# Patient Record
Sex: MALE | Race: WHITE | NOT HISPANIC OR LATINO | Employment: UNEMPLOYED | URBAN - METROPOLITAN AREA
[De-identification: names, ages, dates, MRNs, and addresses within clinical notes are randomized per-mention and may not be internally consistent; named-entity substitution may affect disease eponyms.]

---

## 2020-03-18 ENCOUNTER — OFFICE VISIT (OUTPATIENT)
Dept: URGENT CARE | Facility: CLINIC | Age: 50
End: 2020-03-18

## 2020-03-18 VITALS
TEMPERATURE: 97.7 F | HEART RATE: 101 BPM | OXYGEN SATURATION: 98 % | DIASTOLIC BLOOD PRESSURE: 96 MMHG | SYSTOLIC BLOOD PRESSURE: 174 MMHG

## 2020-03-18 DIAGNOSIS — J06.9 URI WITH COUGH AND CONGESTION: Primary | ICD-10-CM

## 2020-03-18 DIAGNOSIS — J20.8 ACUTE BACTERIAL BRONCHITIS: ICD-10-CM

## 2020-03-18 DIAGNOSIS — B96.89 ACUTE BACTERIAL BRONCHITIS: ICD-10-CM

## 2020-03-18 PROCEDURE — 99213 OFFICE O/P EST LOW 20 MIN: CPT | Performed by: PHYSICIAN ASSISTANT

## 2020-03-18 RX ORDER — AZITHROMYCIN 250 MG/1
TABLET, FILM COATED ORAL
Qty: 6 TABLET | Refills: 0 | Status: SHIPPED | OUTPATIENT
Start: 2020-03-18 | End: 2020-03-22

## 2020-03-18 RX ORDER — ALBUTEROL SULFATE 90 UG/1
2 AEROSOL, METERED RESPIRATORY (INHALATION) EVERY 4 HOURS PRN
Qty: 8.5 G | Refills: 0 | Status: ON HOLD | OUTPATIENT
Start: 2020-03-18 | End: 2021-03-25 | Stop reason: SDUPTHER

## 2020-03-18 NOTE — PATIENT INSTRUCTIONS
Take the antibiotic as directed with food and water  Take a probiotic while taking this medication  Use the inhaler as directed  Ensure that you prime the inhaler prior to first use and rinse your mouth after each use  Begin Mucinex DM for cough and congestion relief  Saltwater gargles, warm tea with honey, throat lozenges, and steam showers may help to reduce coughing fits  Use a cool mist humidifier at bedtime, turning on hours prior to bed with your bedroom doors shut for maximum relief  Follow up with your family doctor in 3-5 days if symptoms persist   Proceed to the ER if symptoms worsen  How to Use a Metered-Dose Inhaler   WHAT YOU NEED TO KNOW:   A metered-dose inhaler is a handheld device that gives you a dose of medicine as a mist  You breathe the medicine deep into your lungs to open your airways  The medicine either gives quick relief or long term control of symptoms  Bronchodilators open your airways  Mucolytics thin secretions and make them easier to cough up  Steroids decrease inflammation in your airways  DISCHARGE INSTRUCTIONS:   Return to the emergency department if:   · Your lips or nails turn blue or gray  · The skin between your ribs or around your neck pulls in with every breath  · You feel short of breath, even after you use your inhaler  Contact your healthcare provider if:   · You feel the medicine spray on your tongue or throat, rather than going into your lungs  · You have to take more puffs from the inhaler than directed, in order to get relief  · You run out of medicine before your next refill is due  · You feel like your medicine is not making your symptoms better  · You have questions or concerns about your condition or care  How to use a metered-dose inhaler:  Practice using your inhaler  Your medicine will work best if you use them correctly  The following steps will help you use your inhaler correctly:  · Prepare your inhaler:     ¨ Remove the cap  Check to make sure there is nothing in the mouthpiece that could block the medicine from coming out  ¨ Shake the inhaler to mix the medicine  ¨ Hold the inhaler upright, with the mouthpiece pointing towards your mouth  · Get ready to breathe in the medicine:      ¨ Keep your mouth away from the inhaler mouthpiece, and breathe out fully to clear your lungs  ¨ Place the mouthpiece between your lips  Close your lips around the mouthpiece to form a seal and prevent a medicine leak  · Start to breathe in slowly through your mouth  as  you press down the canister  This helps the medicine get into your lungs  · Hold your breath for at least 5 seconds  This will help the medicine reach all parts of your lungs, including the smaller parts called the alveoli  · Breathe out slowly through pursed lips  This helps keep your airway open and allows the medicine to be absorbed into more areas  · Repeat puffs of medicine as directed by your healthcare provider  Wait about 2 minutes between puffs  If you need to use a bronchodilator and a steroid inhaler, use the bronchodilator first  Wait 5 minutes then use the steroid inhaler  · Gargle with warm water to remove any leftover medicine from your mouth and throat  Care for your inhaler properly:  Put the cap back on the inhaler after each use to keep the mouthpiece clean  Clean the inhaler at least once a week  Remove the canister and wash the ray with warm soapy water  Rinse and allow to air dry  Make sure the ray is completely dry before putting the canister back in  Follow up with your healthcare provider or specialist as directed:  Bring your inhaler to all of your visits  You may be asked to use your inhaler at these visits so your healthcare provider can make sure you are using it correctly  Write down your questions, so you remember to ask them during your visits    © 2017 [employer] Information is for End User's use only and may not be sold, redistributed or otherwise used for commercial purposes  All illustrations and images included in CareNotes® are the copyrighted property of [employer]  or [employer]kat [employer]  The above information is an  only  It is not intended as medical advice for individual conditions or treatments  Talk to your doctor, nurse or pharmacist before following any medical regimen to see if it is safe and effective for you

## 2020-03-18 NOTE — LETTER
March 18, 2020     Patient: Hola Marroquin   YOB: 1970   Date of Visit: 3/18/2020       To Whom It May Concern: It is my medical opinion that Hola Marroquin may return to work when fever free for 24 hours without the use of fever reducing medications  If you have any questions or concerns, please don't hesitate to call           Sincerely,        Chinedu Smith PA-C

## 2020-03-18 NOTE — PROGRESS NOTES
[address] [facility]        NAME: Brock Gonzales is a 45 y o  male  : 1970    MRN: [id number]  DATE: 2020  TIME: 2:55 PM    Assessment and Plan   URI with cough and congestion [J06 9]  1  URI with cough and congestion     2  Acute bacterial bronchitis  azithromycin (ZITHROMAX) 250 mg tablet    albuterol (ProAir HFA) 90 mcg/act inhaler     Patient Instructions   Take the antibiotic as directed with food and water  Take a probiotic while taking this medication  Use the inhaler as directed  Ensure that you prime the inhaler prior to first use and rinse your mouth after each use  Begin Mucinex DM for cough and congestion relief  Saltwater gargles, warm tea with honey, throat lozenges, and steam showers may help to reduce coughing fits  Use a cool mist humidifier at bedtime, turning on hours prior to bed with your bedroom doors shut for maximum relief  Follow up with your family doctor in 3-5 days if symptoms persist   Proceed to the ER if symptoms worsen  Patient notified of elevated blood pressure in office   He was advised to establish care with a PCP and was provided with resources to do so  Patient refused x-ray noting that he had to leave as his ride was waiting for him  Antibiotics sent to pharmacy for coverage of a presumed bacterial bronchitis  Reviewed use of inhaler  Advised supportive therapies in follow-up if symptoms persist or worsen  All questions answered  Precautions given  Patient notified Patient verbalized understanding and agreement with this plan  Chief Complaint   No chief complaint on file  History of Present Illness     46 y/o male with c/o cough x 10 days  He reports NC, RN, and PND at onset  No ear pain  Sore throat from cough  He reports intermittent wheezing, primarily at bedtime and slight ZAYAS  Cough is NP, occasional throughout the day but increases in frequency and spacticity at nighttime  Four days ago developed fever, tmax 101, sweats, and diarrhea  Lightheadedness with standing, otherwise no body aches, chills,   States he checked it on day of onset, but hasn't checked since  Today is beginning to feel better, but is concerned for persistence of cough  Treating with tylenol, last taken yesterday evening  No fever in office  No sick contacts or recent travel  Nonsmoker  No flu shot  Review of Systems   Review of Systems   Constitutional: Positive for diaphoresis, fatigue and fever  Negative for chills  HENT: Positive for congestion, ear pain, postnasal drip, rhinorrhea and sore throat  Respiratory: Positive for cough, shortness of breath and wheezing  Negative for chest tightness  Cardiovascular: Negative for chest pain and palpitations  Gastrointestinal: Positive for diarrhea  Negative for abdominal pain, nausea and vomiting  Musculoskeletal: Negative for myalgias  Skin: Negative for rash  Neurological: Positive for light-headedness  Negative for headaches  Current Medications       Current Outpatient Medications:     albuterol (ProAir HFA) 90 mcg/act inhaler, Inhale 2 puffs every 4 (four) hours as needed for wheezing or shortness of breath, Disp: 8 5 g, Rfl: 0    azithromycin (ZITHROMAX) 250 mg tablet, Take two tablets on day one, then one tablet daily  , Disp: 6 tablet, Rfl: 0    Current Allergies     Allergies as of 03/18/2020    (No Known Allergies)            The following portions of the patient's history were reviewed and updated as appropriate: allergies, current medications, past family history, past medical history, past social history, past surgical history and problem list      History reviewed  No pertinent past medical history  History reviewed  No pertinent surgical history  History reviewed  No pertinent family history  Medications have been verified          Objective   BP (!) 174/96   Pulse 101   Temp 97 7 °F (36 5 °C)   SpO2 98%        Physical Exam     Physical Exam   Constitutional: Vital signs are normal  He appears well-developed and well-nourished  He is cooperative  He appears ill  No distress  HENT:   Head: Normocephalic and atraumatic  Right Ear: Hearing, tympanic membrane, external ear and ear canal normal    Left Ear: Hearing, tympanic membrane, external ear and ear canal normal    Nose: Nose normal    Mouth/Throat: Uvula is midline, oropharynx is clear and moist and mucous membranes are normal  Mucous membranes are not pale, not dry and not cyanotic  No oral lesions  No uvula swelling  No oropharyngeal exudate, posterior oropharyngeal edema, posterior oropharyngeal erythema or tonsillar abscesses  Eyes: Conjunctivae and lids are normal  Right eye exhibits no discharge and no exudate  Left eye exhibits no discharge and no exudate  Neck: Trachea normal and phonation normal  Neck supple  No tracheal tenderness present  No neck rigidity  No edema and no erythema present  Cardiovascular: Normal rate, regular rhythm and normal heart sounds  Exam reveals no distant heart sounds  Pulmonary/Chest: Effort normal  No stridor  No respiratory distress  He has decreased breath sounds in the right lower field and the left lower field  He has no wheezes  He has rhonchi (clears with coughing) in the left lower field  He has no rales  Abdominal: Soft  Bowel sounds are normal  He exhibits no distension and no mass  There is no tenderness  There is no rigidity, no rebound and no guarding  Lymphadenopathy:     He has no cervical adenopathy  Neurological: He is alert  He is not disoriented  No cranial nerve deficit  Coordination and gait normal    Skin: Skin is warm, dry and intact  No rash noted  He is not diaphoretic  No erythema  No pallor  Psychiatric: He has a normal mood and affect  His behavior is normal  Judgment and thought content normal    Nursing note and vitals reviewed

## 2020-04-22 ENCOUNTER — TELEMEDICINE (OUTPATIENT)
Dept: FAMILY MEDICINE CLINIC | Facility: CLINIC | Age: 50
End: 2020-04-22
Payer: OTHER GOVERNMENT

## 2020-04-22 ENCOUNTER — TELEPHONE (OUTPATIENT)
Dept: FAMILY MEDICINE CLINIC | Facility: CLINIC | Age: 50
End: 2020-04-22

## 2020-04-22 DIAGNOSIS — R05.9 COUGH: Primary | ICD-10-CM

## 2020-04-22 DIAGNOSIS — Z20.828 EXPOSURE TO SARS-ASSOCIATED CORONAVIRUS: ICD-10-CM

## 2020-04-22 DIAGNOSIS — R06.02 SHORTNESS OF BREATH: ICD-10-CM

## 2020-04-22 PROCEDURE — G2012 BRIEF CHECK IN BY MD/QHP: HCPCS | Performed by: FAMILY MEDICINE

## 2020-04-22 PROCEDURE — 87635 SARS-COV-2 COVID-19 AMP PRB: CPT

## 2020-04-23 DIAGNOSIS — R05.9 COUGH: Primary | ICD-10-CM

## 2020-04-23 DIAGNOSIS — R06.02 SHORTNESS OF BREATH: ICD-10-CM

## 2020-04-23 LAB — SARS-COV-2 RNA SPEC QL NAA+PROBE: NOT DETECTED

## 2020-04-24 ENCOUNTER — APPOINTMENT (OUTPATIENT)
Dept: RADIOLOGY | Facility: CLINIC | Age: 50
End: 2020-04-24

## 2020-04-24 ENCOUNTER — TELEMEDICINE (OUTPATIENT)
Dept: FAMILY MEDICINE CLINIC | Facility: CLINIC | Age: 50
End: 2020-04-24
Payer: OTHER GOVERNMENT

## 2020-04-24 DIAGNOSIS — Z82.49 FAMILY HISTORY OF HEART DISEASE: ICD-10-CM

## 2020-04-24 DIAGNOSIS — I10 ESSENTIAL HYPERTENSION: Primary | ICD-10-CM

## 2020-04-24 DIAGNOSIS — R06.00 DOE (DYSPNEA ON EXERTION): ICD-10-CM

## 2020-04-24 DIAGNOSIS — I10 ESSENTIAL HYPERTENSION: ICD-10-CM

## 2020-04-24 DIAGNOSIS — R06.01 ORTHOPNEA: ICD-10-CM

## 2020-04-24 DIAGNOSIS — R06.02 SHORTNESS OF BREATH: ICD-10-CM

## 2020-04-24 DIAGNOSIS — R05.9 COUGH: ICD-10-CM

## 2020-04-24 DIAGNOSIS — R06.01 ORTHOPNEA: Primary | ICD-10-CM

## 2020-04-24 PROCEDURE — 71046 X-RAY EXAM CHEST 2 VIEWS: CPT

## 2020-04-24 PROCEDURE — G2012 BRIEF CHECK IN BY MD/QHP: HCPCS | Performed by: FAMILY MEDICINE

## 2020-05-01 ENCOUNTER — APPOINTMENT (INPATIENT)
Dept: RADIOLOGY | Facility: HOSPITAL | Age: 50
DRG: 192 | End: 2020-05-01
Payer: COMMERCIAL

## 2020-05-01 ENCOUNTER — TELEPHONE (OUTPATIENT)
Dept: FAMILY MEDICINE CLINIC | Facility: CLINIC | Age: 50
End: 2020-05-01

## 2020-05-01 ENCOUNTER — HOSPITAL ENCOUNTER (INPATIENT)
Facility: HOSPITAL | Age: 50
LOS: 6 days | Discharge: HOME | DRG: 192 | End: 2020-05-07
Attending: EMERGENCY MEDICINE | Admitting: INTERNAL MEDICINE
Payer: COMMERCIAL

## 2020-05-01 ENCOUNTER — APPOINTMENT (EMERGENCY)
Dept: RADIOLOGY | Facility: HOSPITAL | Age: 50
DRG: 192 | End: 2020-05-01
Payer: COMMERCIAL

## 2020-05-01 ENCOUNTER — TELEMEDICINE (OUTPATIENT)
Dept: FAMILY MEDICINE CLINIC | Facility: CLINIC | Age: 50
End: 2020-05-01
Payer: COMMERCIAL

## 2020-05-01 VITALS — SYSTOLIC BLOOD PRESSURE: 178 MMHG | HEART RATE: 111 BPM | DIASTOLIC BLOOD PRESSURE: 135 MMHG

## 2020-05-01 DIAGNOSIS — I16.1 HYPERTENSIVE EMERGENCY: ICD-10-CM

## 2020-05-01 DIAGNOSIS — I50.43 ACUTE ON CHRONIC COMBINED SYSTOLIC AND DIASTOLIC CONGESTIVE HEART FAILURE (HCC): ICD-10-CM

## 2020-05-01 DIAGNOSIS — Z22.322 MRSA (METHICILLIN RESISTANT STAPHYLOCOCCUS AUREUS) COLONIZATION: Primary | ICD-10-CM

## 2020-05-01 DIAGNOSIS — I10 UNCONTROLLED HYPERTENSION: ICD-10-CM

## 2020-05-01 DIAGNOSIS — I10 BENIGN ESSENTIAL HYPERTENSION: ICD-10-CM

## 2020-05-01 DIAGNOSIS — R00.0 TACHYCARDIA: ICD-10-CM

## 2020-05-01 DIAGNOSIS — R06.03 RESPIRATORY DISTRESS: ICD-10-CM

## 2020-05-01 DIAGNOSIS — I42.9 CARDIOMYOPATHY (HCC): ICD-10-CM

## 2020-05-01 DIAGNOSIS — R06.01 ORTHOPNEA: ICD-10-CM

## 2020-05-01 DIAGNOSIS — I10 BENIGN ESSENTIAL HYPERTENSION: Primary | ICD-10-CM

## 2020-05-01 DIAGNOSIS — R78.81 POSITIVE BLOOD CULTURE: ICD-10-CM

## 2020-05-01 DIAGNOSIS — R06.00 DOE (DYSPNEA ON EXERTION): ICD-10-CM

## 2020-05-01 DIAGNOSIS — R06.02 SHORTNESS OF BREATH: ICD-10-CM

## 2020-05-01 DIAGNOSIS — N17.9 AKI (ACUTE KIDNEY INJURY) (HCC): ICD-10-CM

## 2020-05-01 DIAGNOSIS — I50.43 ACUTE ON CHRONIC COMBINED SYSTOLIC AND DIASTOLIC CHF (CONGESTIVE HEART FAILURE) (HCC): ICD-10-CM

## 2020-05-01 DIAGNOSIS — R03.0 ELEVATED BLOOD PRESSURE READING: ICD-10-CM

## 2020-05-01 DIAGNOSIS — Z72.89 ALCOHOL USE: ICD-10-CM

## 2020-05-01 DIAGNOSIS — Z82.49 FAMILY HISTORY OF HEART DISEASE: ICD-10-CM

## 2020-05-01 DIAGNOSIS — K21.9 ESOPHAGEAL REFLUX: ICD-10-CM

## 2020-05-01 DIAGNOSIS — R14.0 ABDOMINAL DISTENSION: ICD-10-CM

## 2020-05-01 PROBLEM — J96.00 ACUTE RESPIRATORY FAILURE (HCC): Status: ACTIVE | Noted: 2020-05-01

## 2020-05-01 PROBLEM — Z78.9 ALCOHOL USE: Status: ACTIVE | Noted: 2020-05-01

## 2020-05-01 PROBLEM — J96.00 ACUTE RESPIRATORY FAILURE (HCC): Status: RESOLVED | Noted: 2020-05-01 | Resolved: 2020-05-01

## 2020-05-01 PROBLEM — I16.0 HYPERTENSIVE URGENCY: Status: ACTIVE | Noted: 2020-05-01

## 2020-05-01 PROBLEM — F10.90 ALCOHOL USE: Status: ACTIVE | Noted: 2020-05-01

## 2020-05-01 PROBLEM — R06.09 DOE (DYSPNEA ON EXERTION): Status: ACTIVE | Noted: 2020-05-01

## 2020-05-01 PROBLEM — J81.1 PULMONARY EDEMA: Status: ACTIVE | Noted: 2020-05-01

## 2020-05-01 LAB
ALBUMIN SERPL BCP-MCNC: 3.8 G/DL (ref 3.5–5)
ALP SERPL-CCNC: 77 U/L (ref 46–116)
ALT SERPL W P-5'-P-CCNC: 52 U/L (ref 12–78)
ANION GAP SERPL CALCULATED.3IONS-SCNC: 13 MMOL/L (ref 4–13)
APTT PPP: 27 SECONDS (ref 23–37)
ARTERIAL PATENCY WRIST A: YES
AST SERPL W P-5'-P-CCNC: 31 U/L (ref 5–45)
ATRIAL RATE: 106 BPM
BASE EXCESS BLDA CALC-SCNC: -2.5 MMOL/L
BASOPHILS # BLD AUTO: 0.07 THOUSANDS/ΜL (ref 0–0.1)
BASOPHILS NFR BLD AUTO: 1 % (ref 0–1)
BILIRUB SERPL-MCNC: 0.6 MG/DL (ref 0.2–1)
BODY TEMPERATURE: 98.4 DEGREES FEHRENHEIT
BUN SERPL-MCNC: 19 MG/DL (ref 5–25)
CALCIUM SERPL-MCNC: 8.9 MG/DL (ref 8.3–10.1)
CHLORIDE SERPL-SCNC: 108 MMOL/L (ref 100–108)
CO2 SERPL-SCNC: 25 MMOL/L (ref 21–32)
CREAT SERPL-MCNC: 1.49 MG/DL (ref 0.6–1.3)
EOSINOPHIL # BLD AUTO: 0.18 THOUSAND/ΜL (ref 0–0.61)
EOSINOPHIL NFR BLD AUTO: 2 % (ref 0–6)
ERYTHROCYTE [DISTWIDTH] IN BLOOD BY AUTOMATED COUNT: 14.1 % (ref 11.6–15.1)
GFR SERPL CREATININE-BSD FRML MDRD: 54 ML/MIN/1.73SQ M
GLUCOSE SERPL-MCNC: 108 MG/DL (ref 65–140)
HCO3 BLDA-SCNC: 22.6 MMOL/L (ref 22–28)
HCT VFR BLD AUTO: 52.1 % (ref 36.5–49.3)
HGB BLD-MCNC: 16.4 G/DL (ref 12–17)
IMM GRANULOCYTES # BLD AUTO: 0.04 THOUSAND/UL (ref 0–0.2)
IMM GRANULOCYTES NFR BLD AUTO: 0 % (ref 0–2)
INR PPP: 1.01 (ref 0.84–1.19)
IPAP: 16
LIPASE SERPL-CCNC: 116 U/L (ref 73–393)
LYMPHOCYTES # BLD AUTO: 2.01 THOUSANDS/ΜL (ref 0.6–4.47)
LYMPHOCYTES NFR BLD AUTO: 19 % (ref 14–44)
MAGNESIUM SERPL-MCNC: 2.1 MG/DL (ref 1.6–2.6)
MCH RBC QN AUTO: 29.7 PG (ref 26.8–34.3)
MCHC RBC AUTO-ENTMCNC: 31.5 G/DL (ref 31.4–37.4)
MCV RBC AUTO: 94 FL (ref 82–98)
MONOCYTES # BLD AUTO: 1.04 THOUSAND/ΜL (ref 0.17–1.22)
MONOCYTES NFR BLD AUTO: 10 % (ref 4–12)
NEUTROPHILS # BLD AUTO: 7.21 THOUSANDS/ΜL (ref 1.85–7.62)
NEUTS SEG NFR BLD AUTO: 68 % (ref 43–75)
NON VENT- BIPAP: ABNORMAL
NRBC BLD AUTO-RTO: 0 /100 WBCS
NT-PROBNP SERPL-MCNC: 5288 PG/ML
O2 CT BLDA-SCNC: 20.4 ML/DL (ref 16–23)
OXYHGB MFR BLDA: 97.5 % (ref 94–97)
P AXIS: 86 DEGREES
PCO2 BLDA: 40.2 MM HG (ref 36–44)
PCO2 TEMP ADJ BLDA: 40 MM HG (ref 36–44)
PEEP MAX SETTING VENT: 5 CM[H2O]
PH BLD: 7.37 [PH] (ref 7.35–7.45)
PH BLDA: 7.37 [PH] (ref 7.35–7.45)
PLATELET # BLD AUTO: 283 THOUSANDS/UL (ref 149–390)
PMV BLD AUTO: 10.6 FL (ref 8.9–12.7)
PO2 BLD: 148 MM HG (ref 75–129)
PO2 BLDA: 148.6 MM HG (ref 75–129)
POTASSIUM SERPL-SCNC: 3.8 MMOL/L (ref 3.5–5.3)
PR INTERVAL: 162 MS
PROCALCITONIN SERPL-MCNC: 0.06 NG/ML
PROT SERPL-MCNC: 7.3 G/DL (ref 6.4–8.2)
PROTHROMBIN TIME: 13.6 SECONDS (ref 11.6–14.5)
QRS AXIS: 120 DEGREES
QRSD INTERVAL: 158 MS
QT INTERVAL: 398 MS
QTC INTERVAL: 528 MS
RBC # BLD AUTO: 5.53 MILLION/UL (ref 3.88–5.62)
SARS-COV-2 RNA RESP QL NAA+PROBE: NEGATIVE
SODIUM SERPL-SCNC: 146 MMOL/L (ref 136–145)
SPECIMEN SOURCE: ABNORMAL
T WAVE AXIS: 14 DEGREES
T4 FREE SERPL-MCNC: 1.11 NG/DL (ref 0.76–1.46)
TROPONIN I SERPL-MCNC: 0.06 NG/ML
TROPONIN I SERPL-MCNC: 0.08 NG/ML
TSH SERPL DL<=0.05 MIU/L-ACNC: 8.8 UIU/ML (ref 0.36–3.74)
VENT BIPAP FIO2: 35 %
VENTRICULAR RATE: 106 BPM
WBC # BLD AUTO: 10.55 THOUSAND/UL (ref 4.31–10.16)

## 2020-05-01 PROCEDURE — 84439 ASSAY OF FREE THYROXINE: CPT | Performed by: NURSE PRACTITIONER

## 2020-05-01 PROCEDURE — 71045 X-RAY EXAM CHEST 1 VIEW: CPT

## 2020-05-01 PROCEDURE — 94760 N-INVAS EAR/PLS OXIMETRY 1: CPT

## 2020-05-01 PROCEDURE — 85610 PROTHROMBIN TIME: CPT | Performed by: EMERGENCY MEDICINE

## 2020-05-01 PROCEDURE — 87147 CULTURE TYPE IMMUNOLOGIC: CPT | Performed by: ANESTHESIOLOGY

## 2020-05-01 PROCEDURE — 96374 THER/PROPH/DIAG INJ IV PUSH: CPT

## 2020-05-01 PROCEDURE — 87635 SARS-COV-2 COVID-19 AMP PRB: CPT | Performed by: NURSE PRACTITIONER

## 2020-05-01 PROCEDURE — 80053 COMPREHEN METABOLIC PANEL: CPT | Performed by: EMERGENCY MEDICINE

## 2020-05-01 PROCEDURE — 85730 THROMBOPLASTIN TIME PARTIAL: CPT | Performed by: EMERGENCY MEDICINE

## 2020-05-01 PROCEDURE — 71250 CT THORAX DX C-: CPT

## 2020-05-01 PROCEDURE — 94002 VENT MGMT INPAT INIT DAY: CPT

## 2020-05-01 PROCEDURE — 84145 PROCALCITONIN (PCT): CPT | Performed by: NURSE PRACTITIONER

## 2020-05-01 PROCEDURE — 96365 THER/PROPH/DIAG IV INF INIT: CPT

## 2020-05-01 PROCEDURE — 87147 CULTURE TYPE IMMUNOLOGIC: CPT | Performed by: NURSE PRACTITIONER

## 2020-05-01 PROCEDURE — 36600 WITHDRAWAL OF ARTERIAL BLOOD: CPT

## 2020-05-01 PROCEDURE — 96366 THER/PROPH/DIAG IV INF ADDON: CPT

## 2020-05-01 PROCEDURE — 84484 ASSAY OF TROPONIN QUANT: CPT | Performed by: NURSE PRACTITIONER

## 2020-05-01 PROCEDURE — 74176 CT ABD & PELVIS W/O CONTRAST: CPT

## 2020-05-01 PROCEDURE — 87081 CULTURE SCREEN ONLY: CPT | Performed by: ANESTHESIOLOGY

## 2020-05-01 PROCEDURE — 83690 ASSAY OF LIPASE: CPT | Performed by: EMERGENCY MEDICINE

## 2020-05-01 PROCEDURE — 83735 ASSAY OF MAGNESIUM: CPT | Performed by: EMERGENCY MEDICINE

## 2020-05-01 PROCEDURE — 36415 COLL VENOUS BLD VENIPUNCTURE: CPT | Performed by: EMERGENCY MEDICINE

## 2020-05-01 PROCEDURE — 99291 CRITICAL CARE FIRST HOUR: CPT

## 2020-05-01 PROCEDURE — 99213 OFFICE O/P EST LOW 20 MIN: CPT | Performed by: FAMILY MEDICINE

## 2020-05-01 PROCEDURE — 84443 ASSAY THYROID STIM HORMONE: CPT | Performed by: EMERGENCY MEDICINE

## 2020-05-01 PROCEDURE — 84439 ASSAY OF FREE THYROXINE: CPT | Performed by: EMERGENCY MEDICINE

## 2020-05-01 PROCEDURE — 85025 COMPLETE CBC W/AUTO DIFF WBC: CPT | Performed by: EMERGENCY MEDICINE

## 2020-05-01 PROCEDURE — 87040 BLOOD CULTURE FOR BACTERIA: CPT | Performed by: NURSE PRACTITIONER

## 2020-05-01 PROCEDURE — 93005 ELECTROCARDIOGRAM TRACING: CPT

## 2020-05-01 PROCEDURE — 84484 ASSAY OF TROPONIN QUANT: CPT | Performed by: EMERGENCY MEDICINE

## 2020-05-01 PROCEDURE — 99223 1ST HOSP IP/OBS HIGH 75: CPT | Performed by: PHYSICIAN ASSISTANT

## 2020-05-01 PROCEDURE — 87186 SC STD MICRODIL/AGAR DIL: CPT | Performed by: NURSE PRACTITIONER

## 2020-05-01 PROCEDURE — 96375 TX/PRO/DX INJ NEW DRUG ADDON: CPT

## 2020-05-01 PROCEDURE — 82805 BLOOD GASES W/O2 SATURATION: CPT | Performed by: NURSE PRACTITIONER

## 2020-05-01 PROCEDURE — 83880 ASSAY OF NATRIURETIC PEPTIDE: CPT | Performed by: EMERGENCY MEDICINE

## 2020-05-01 PROCEDURE — 99291 CRITICAL CARE FIRST HOUR: CPT | Performed by: EMERGENCY MEDICINE

## 2020-05-01 PROCEDURE — 94003 VENT MGMT INPAT SUBQ DAY: CPT

## 2020-05-01 PROCEDURE — 87077 CULTURE AEROBIC IDENTIFY: CPT | Performed by: NURSE PRACTITIONER

## 2020-05-01 PROCEDURE — 93010 ELECTROCARDIOGRAM REPORT: CPT | Performed by: INTERNAL MEDICINE

## 2020-05-01 RX ORDER — NITROGLYCERIN 0.4 MG/1
0.4 TABLET SUBLINGUAL ONCE
Status: COMPLETED | OUTPATIENT
Start: 2020-05-01 | End: 2020-05-01

## 2020-05-01 RX ORDER — ASPIRIN 81 MG/1
81 TABLET, CHEWABLE ORAL DAILY
Status: DISCONTINUED | OUTPATIENT
Start: 2020-05-02 | End: 2020-05-07 | Stop reason: HOSPADM

## 2020-05-01 RX ORDER — HEPARIN SODIUM 5000 [USP'U]/ML
7500 INJECTION, SOLUTION INTRAVENOUS; SUBCUTANEOUS EVERY 8 HOURS SCHEDULED
Status: DISCONTINUED | OUTPATIENT
Start: 2020-05-01 | End: 2020-05-07 | Stop reason: HOSPADM

## 2020-05-01 RX ORDER — METOCLOPRAMIDE HYDROCHLORIDE 5 MG/ML
10 INJECTION INTRAMUSCULAR; INTRAVENOUS ONCE
Status: COMPLETED | OUTPATIENT
Start: 2020-05-01 | End: 2020-05-01

## 2020-05-01 RX ORDER — FUROSEMIDE 10 MG/ML
40 INJECTION INTRAMUSCULAR; INTRAVENOUS ONCE
Status: COMPLETED | OUTPATIENT
Start: 2020-05-01 | End: 2020-05-01

## 2020-05-01 RX ORDER — ENALAPRILAT 2.5 MG/2ML
1.25 INJECTION INTRAVENOUS ONCE
Status: COMPLETED | OUTPATIENT
Start: 2020-05-01 | End: 2020-05-01

## 2020-05-01 RX ORDER — AMOXICILLIN 250 MG
2 CAPSULE ORAL 2 TIMES DAILY
Status: DISCONTINUED | OUTPATIENT
Start: 2020-05-01 | End: 2020-05-07 | Stop reason: HOSPADM

## 2020-05-01 RX ORDER — NITROGLYCERIN 20 MG/100ML
5-200 INJECTION INTRAVENOUS
Status: DISCONTINUED | OUTPATIENT
Start: 2020-05-01 | End: 2020-05-03

## 2020-05-01 RX ORDER — ASPIRIN 81 MG/1
324 TABLET, CHEWABLE ORAL ONCE
Status: COMPLETED | OUTPATIENT
Start: 2020-05-01 | End: 2020-05-01

## 2020-05-01 RX ORDER — POLYETHYLENE GLYCOL 3350 17 G/17G
17 POWDER, FOR SOLUTION ORAL DAILY
Status: DISCONTINUED | OUTPATIENT
Start: 2020-05-01 | End: 2020-05-07 | Stop reason: HOSPADM

## 2020-05-01 RX ADMIN — NITROGLYCERIN 0.4 MG: 0.4 TABLET SUBLINGUAL at 15:42

## 2020-05-01 RX ADMIN — SENNOSIDES AND DOCUSATE SODIUM 2 TABLET: 8.6; 5 TABLET ORAL at 22:00

## 2020-05-01 RX ADMIN — ASPIRIN 81 MG 324 MG: 81 TABLET ORAL at 16:45

## 2020-05-01 RX ADMIN — FUROSEMIDE 40 MG: 10 INJECTION, SOLUTION INTRAMUSCULAR; INTRAVENOUS at 16:47

## 2020-05-01 RX ADMIN — METOCLOPRAMIDE 10 MG: 5 INJECTION, SOLUTION INTRAMUSCULAR; INTRAVENOUS at 21:53

## 2020-05-01 RX ADMIN — POLYETHYLENE GLYCOL 3350 17 G: 17 POWDER, FOR SOLUTION ORAL at 22:00

## 2020-05-01 RX ADMIN — NITROGLYCERIN 30 MCG/MIN: 20 INJECTION INTRAVENOUS at 15:49

## 2020-05-01 RX ADMIN — ENALAPRILAT 1.25 MG: 1.25 INJECTION INTRAVENOUS at 17:05

## 2020-05-01 RX ADMIN — HEPARIN SODIUM 7500 UNITS: 5000 INJECTION INTRAVENOUS; SUBCUTANEOUS at 21:52

## 2020-05-01 RX ADMIN — METOPROLOL TARTRATE 25 MG: 25 TABLET, FILM COATED ORAL at 21:54

## 2020-05-02 ENCOUNTER — APPOINTMENT (INPATIENT)
Dept: NON INVASIVE DIAGNOSTICS | Facility: HOSPITAL | Age: 50
DRG: 192 | End: 2020-05-02
Payer: COMMERCIAL

## 2020-05-02 LAB
ALBUMIN SERPL BCP-MCNC: 3.1 G/DL (ref 3.5–5)
ALP SERPL-CCNC: 65 U/L (ref 46–116)
ALT SERPL W P-5'-P-CCNC: 43 U/L (ref 12–78)
AMPHETAMINES SERPL QL SCN: POSITIVE
ANION GAP SERPL CALCULATED.3IONS-SCNC: 10 MMOL/L (ref 4–13)
AST SERPL W P-5'-P-CCNC: 24 U/L (ref 5–45)
BACTERIA UR QL AUTO: ABNORMAL /HPF
BARBITURATES UR QL: NEGATIVE
BASOPHILS # BLD AUTO: 0.05 THOUSANDS/ΜL (ref 0–0.1)
BASOPHILS NFR BLD AUTO: 0 % (ref 0–1)
BENZODIAZ UR QL: NEGATIVE
BILIRUB SERPL-MCNC: 1.1 MG/DL (ref 0.2–1)
BILIRUB UR QL STRIP: NEGATIVE
BUN SERPL-MCNC: 16 MG/DL (ref 5–25)
CALCIUM SERPL-MCNC: 8.2 MG/DL (ref 8.3–10.1)
CHLORIDE SERPL-SCNC: 109 MMOL/L (ref 100–108)
CLARITY UR: CLEAR
CO2 SERPL-SCNC: 25 MMOL/L (ref 21–32)
COCAINE UR QL: NEGATIVE
COLOR UR: YELLOW
CREAT SERPL-MCNC: 1.34 MG/DL (ref 0.6–1.3)
EOSINOPHIL # BLD AUTO: 0.19 THOUSAND/ΜL (ref 0–0.61)
EOSINOPHIL NFR BLD AUTO: 2 % (ref 0–6)
ERYTHROCYTE [DISTWIDTH] IN BLOOD BY AUTOMATED COUNT: 14 % (ref 11.6–15.1)
GFR SERPL CREATININE-BSD FRML MDRD: 61 ML/MIN/1.73SQ M
GLUCOSE SERPL-MCNC: 104 MG/DL (ref 65–140)
GLUCOSE SERPL-MCNC: 125 MG/DL (ref 65–140)
GLUCOSE UR STRIP-MCNC: NEGATIVE MG/DL
HCT VFR BLD AUTO: 43 % (ref 36.5–49.3)
HGB BLD-MCNC: 13.6 G/DL (ref 12–17)
HGB UR QL STRIP.AUTO: ABNORMAL
HYALINE CASTS #/AREA URNS LPF: ABNORMAL /LPF
IMM GRANULOCYTES # BLD AUTO: 0.03 THOUSAND/UL (ref 0–0.2)
IMM GRANULOCYTES NFR BLD AUTO: 0 % (ref 0–2)
KETONES UR STRIP-MCNC: NEGATIVE MG/DL
LEUKOCYTE ESTERASE UR QL STRIP: NEGATIVE
LIPASE SERPL-CCNC: 123 U/L (ref 73–393)
LYMPHOCYTES # BLD AUTO: 1.23 THOUSANDS/ΜL (ref 0.6–4.47)
LYMPHOCYTES NFR BLD AUTO: 11 % (ref 14–44)
MAGNESIUM SERPL-MCNC: 1.9 MG/DL (ref 1.6–2.6)
MCH RBC QN AUTO: 29.4 PG (ref 26.8–34.3)
MCHC RBC AUTO-ENTMCNC: 31.6 G/DL (ref 31.4–37.4)
MCV RBC AUTO: 93 FL (ref 82–98)
METHADONE UR QL: NEGATIVE
MONOCYTES # BLD AUTO: 1.25 THOUSAND/ΜL (ref 0.17–1.22)
MONOCYTES NFR BLD AUTO: 11 % (ref 4–12)
MUCOUS THREADS UR QL AUTO: ABNORMAL
NEUTROPHILS # BLD AUTO: 8.49 THOUSANDS/ΜL (ref 1.85–7.62)
NEUTS SEG NFR BLD AUTO: 76 % (ref 43–75)
NITRITE UR QL STRIP: NEGATIVE
NON-SQ EPI CELLS URNS QL MICRO: ABNORMAL /HPF
NRBC BLD AUTO-RTO: 0 /100 WBCS
OPIATES UR QL SCN: NEGATIVE
PCP UR QL: NEGATIVE
PH UR STRIP.AUTO: 5.5 [PH]
PHOSPHATE SERPL-MCNC: 4.9 MG/DL (ref 2.7–4.5)
PLATELET # BLD AUTO: 223 THOUSANDS/UL (ref 149–390)
PMV BLD AUTO: 10.2 FL (ref 8.9–12.7)
POTASSIUM SERPL-SCNC: 3.7 MMOL/L (ref 3.5–5.3)
PROT SERPL-MCNC: 6.1 G/DL (ref 6.4–8.2)
PROT UR STRIP-MCNC: ABNORMAL MG/DL
RBC # BLD AUTO: 4.63 MILLION/UL (ref 3.88–5.62)
RBC #/AREA URNS AUTO: ABNORMAL /HPF
SODIUM SERPL-SCNC: 144 MMOL/L (ref 136–145)
SP GR UR STRIP.AUTO: 1.02 (ref 1–1.03)
T4 FREE SERPL-MCNC: 1.02 NG/DL (ref 0.76–1.46)
THC UR QL: NEGATIVE
UROBILINOGEN UR QL STRIP.AUTO: 0.2 E.U./DL
WBC # BLD AUTO: 11.24 THOUSAND/UL (ref 4.31–10.16)
WBC #/AREA URNS AUTO: ABNORMAL /HPF

## 2020-05-02 PROCEDURE — 94760 N-INVAS EAR/PLS OXIMETRY 1: CPT

## 2020-05-02 PROCEDURE — 93306 TTE W/DOPPLER COMPLETE: CPT

## 2020-05-02 PROCEDURE — 93306 TTE W/DOPPLER COMPLETE: CPT | Performed by: INTERNAL MEDICINE

## 2020-05-02 PROCEDURE — 80307 DRUG TEST PRSMV CHEM ANLYZR: CPT | Performed by: PHYSICIAN ASSISTANT

## 2020-05-02 PROCEDURE — 81001 URINALYSIS AUTO W/SCOPE: CPT | Performed by: PHYSICIAN ASSISTANT

## 2020-05-02 PROCEDURE — 97163 PT EVAL HIGH COMPLEX 45 MIN: CPT

## 2020-05-02 PROCEDURE — 97167 OT EVAL HIGH COMPLEX 60 MIN: CPT

## 2020-05-02 PROCEDURE — 82948 REAGENT STRIP/BLOOD GLUCOSE: CPT

## 2020-05-02 PROCEDURE — 83690 ASSAY OF LIPASE: CPT | Performed by: NURSE PRACTITIONER

## 2020-05-02 PROCEDURE — 85025 COMPLETE CBC W/AUTO DIFF WBC: CPT | Performed by: NURSE PRACTITIONER

## 2020-05-02 PROCEDURE — 99232 SBSQ HOSP IP/OBS MODERATE 35: CPT | Performed by: ANESTHESIOLOGY

## 2020-05-02 PROCEDURE — 94003 VENT MGMT INPAT SUBQ DAY: CPT

## 2020-05-02 PROCEDURE — 84100 ASSAY OF PHOSPHORUS: CPT | Performed by: NURSE PRACTITIONER

## 2020-05-02 PROCEDURE — 83735 ASSAY OF MAGNESIUM: CPT | Performed by: NURSE PRACTITIONER

## 2020-05-02 PROCEDURE — 80053 COMPREHEN METABOLIC PANEL: CPT | Performed by: NURSE PRACTITIONER

## 2020-05-02 RX ORDER — LISINOPRIL 5 MG/1
5 TABLET ORAL DAILY
Status: DISCONTINUED | OUTPATIENT
Start: 2020-05-02 | End: 2020-05-07 | Stop reason: HOSPADM

## 2020-05-02 RX ORDER — MAGNESIUM HYDROXIDE/ALUMINUM HYDROXICE/SIMETHICONE 120; 1200; 1200 MG/30ML; MG/30ML; MG/30ML
30 SUSPENSION ORAL EVERY 4 HOURS PRN
Status: DISCONTINUED | OUTPATIENT
Start: 2020-05-02 | End: 2020-05-07 | Stop reason: HOSPADM

## 2020-05-02 RX ORDER — THIAMINE MONONITRATE (VIT B1) 100 MG
100 TABLET ORAL DAILY
Status: DISCONTINUED | OUTPATIENT
Start: 2020-05-02 | End: 2020-05-07 | Stop reason: HOSPADM

## 2020-05-02 RX ORDER — PANTOPRAZOLE SODIUM 40 MG/1
40 TABLET, DELAYED RELEASE ORAL
Status: DISCONTINUED | OUTPATIENT
Start: 2020-05-02 | End: 2020-05-07 | Stop reason: HOSPADM

## 2020-05-02 RX ORDER — FOLIC ACID 1 MG/1
1 TABLET ORAL DAILY
Status: DISCONTINUED | OUTPATIENT
Start: 2020-05-02 | End: 2020-05-07 | Stop reason: HOSPADM

## 2020-05-02 RX ORDER — SPIRONOLACTONE 25 MG/1
25 TABLET ORAL DAILY
Status: DISCONTINUED | OUTPATIENT
Start: 2020-05-02 | End: 2020-05-07

## 2020-05-02 RX ORDER — FUROSEMIDE 10 MG/ML
40 INJECTION INTRAMUSCULAR; INTRAVENOUS ONCE
Status: COMPLETED | OUTPATIENT
Start: 2020-05-02 | End: 2020-05-02

## 2020-05-02 RX ADMIN — HEPARIN SODIUM 7500 UNITS: 5000 INJECTION INTRAVENOUS; SUBCUTANEOUS at 21:26

## 2020-05-02 RX ADMIN — SENNOSIDES AND DOCUSATE SODIUM 2 TABLET: 8.6; 5 TABLET ORAL at 09:29

## 2020-05-02 RX ADMIN — METOPROLOL TARTRATE 25 MG: 25 TABLET, FILM COATED ORAL at 20:36

## 2020-05-02 RX ADMIN — METOPROLOL TARTRATE 25 MG: 25 TABLET, FILM COATED ORAL at 09:30

## 2020-05-02 RX ADMIN — ALUMINUM HYDROXIDE, MAGNESIUM HYDROXIDE, AND SIMETHICONE 30 ML: 200; 200; 20 SUSPENSION ORAL at 21:15

## 2020-05-02 RX ADMIN — SENNOSIDES AND DOCUSATE SODIUM 2 TABLET: 8.6; 5 TABLET ORAL at 17:56

## 2020-05-02 RX ADMIN — LISINOPRIL 5 MG: 5 TABLET ORAL at 12:07

## 2020-05-02 RX ADMIN — PANTOPRAZOLE SODIUM 40 MG: 40 TABLET, DELAYED RELEASE ORAL at 21:15

## 2020-05-02 RX ADMIN — ASPIRIN 81 MG 81 MG: 81 TABLET ORAL at 09:30

## 2020-05-02 RX ADMIN — POLYETHYLENE GLYCOL 3350 17 G: 17 POWDER, FOR SOLUTION ORAL at 09:30

## 2020-05-02 RX ADMIN — FUROSEMIDE 40 MG: 10 INJECTION, SOLUTION INTRAMUSCULAR; INTRAVENOUS at 12:08

## 2020-05-02 RX ADMIN — HEPARIN SODIUM 7500 UNITS: 5000 INJECTION INTRAVENOUS; SUBCUTANEOUS at 13:58

## 2020-05-02 RX ADMIN — FOLIC ACID 1 MG: 1 TABLET ORAL at 12:09

## 2020-05-02 RX ADMIN — HEPARIN SODIUM 7500 UNITS: 5000 INJECTION INTRAVENOUS; SUBCUTANEOUS at 06:09

## 2020-05-02 RX ADMIN — SPIRONOLACTONE 25 MG: 25 TABLET ORAL at 17:56

## 2020-05-02 RX ADMIN — Medication 100 MG: at 12:09

## 2020-05-02 RX ADMIN — Medication 1 TABLET: at 12:07

## 2020-05-03 PROBLEM — I10 UNCONTROLLED HYPERTENSION: Status: ACTIVE | Noted: 2020-05-01

## 2020-05-03 PROBLEM — I42.9 CARDIOMYOPATHY (HCC): Status: ACTIVE | Noted: 2020-05-03

## 2020-05-03 PROBLEM — R78.81 POSITIVE BLOOD CULTURE: Status: ACTIVE | Noted: 2020-05-03

## 2020-05-03 LAB
ANION GAP SERPL CALCULATED.3IONS-SCNC: 7 MMOL/L (ref 4–13)
BUN SERPL-MCNC: 16 MG/DL (ref 5–25)
CALCIUM SERPL-MCNC: 8.6 MG/DL (ref 8.3–10.1)
CHLORIDE SERPL-SCNC: 107 MMOL/L (ref 100–108)
CO2 SERPL-SCNC: 29 MMOL/L (ref 21–32)
CREAT SERPL-MCNC: 1.34 MG/DL (ref 0.6–1.3)
ERYTHROCYTE [DISTWIDTH] IN BLOOD BY AUTOMATED COUNT: 13.8 % (ref 11.6–15.1)
ERYTHROCYTE [DISTWIDTH] IN BLOOD BY AUTOMATED COUNT: 14.1 % (ref 11.6–15.1)
GFR SERPL CREATININE-BSD FRML MDRD: 61 ML/MIN/1.73SQ M
GLUCOSE SERPL-MCNC: 94 MG/DL (ref 65–140)
HCT VFR BLD AUTO: 46.4 % (ref 36.5–49.3)
HCT VFR BLD AUTO: 48.1 % (ref 36.5–49.3)
HGB BLD-MCNC: 14.5 G/DL (ref 12–17)
HGB BLD-MCNC: 15 G/DL (ref 12–17)
MCH RBC QN AUTO: 29.2 PG (ref 26.8–34.3)
MCH RBC QN AUTO: 29.4 PG (ref 26.8–34.3)
MCHC RBC AUTO-ENTMCNC: 31.2 G/DL (ref 31.4–37.4)
MCHC RBC AUTO-ENTMCNC: 31.3 G/DL (ref 31.4–37.4)
MCV RBC AUTO: 94 FL (ref 82–98)
MCV RBC AUTO: 94 FL (ref 82–98)
MRSA NOSE QL CULT: ABNORMAL
MRSA NOSE QL CULT: ABNORMAL
PLATELET # BLD AUTO: 217 THOUSANDS/UL (ref 149–390)
PLATELET # BLD AUTO: 252 THOUSANDS/UL (ref 149–390)
PMV BLD AUTO: 10.1 FL (ref 8.9–12.7)
PMV BLD AUTO: 10.2 FL (ref 8.9–12.7)
POTASSIUM SERPL-SCNC: 3.9 MMOL/L (ref 3.5–5.3)
RBC # BLD AUTO: 4.94 MILLION/UL (ref 3.88–5.62)
RBC # BLD AUTO: 5.14 MILLION/UL (ref 3.88–5.62)
SODIUM SERPL-SCNC: 143 MMOL/L (ref 136–145)
WBC # BLD AUTO: 8.71 THOUSAND/UL (ref 4.31–10.16)
WBC # BLD AUTO: 9.23 THOUSAND/UL (ref 4.31–10.16)

## 2020-05-03 PROCEDURE — 90682 RIV4 VACC RECOMBINANT DNA IM: CPT | Performed by: NURSE PRACTITIONER

## 2020-05-03 PROCEDURE — 85027 COMPLETE CBC AUTOMATED: CPT | Performed by: INTERNAL MEDICINE

## 2020-05-03 PROCEDURE — 99255 IP/OBS CONSLTJ NEW/EST HI 80: CPT | Performed by: INTERNAL MEDICINE

## 2020-05-03 PROCEDURE — 87040 BLOOD CULTURE FOR BACTERIA: CPT | Performed by: INTERNAL MEDICINE

## 2020-05-03 PROCEDURE — 94760 N-INVAS EAR/PLS OXIMETRY 1: CPT

## 2020-05-03 PROCEDURE — 94003 VENT MGMT INPAT SUBQ DAY: CPT

## 2020-05-03 PROCEDURE — 90471 IMMUNIZATION ADMIN: CPT | Performed by: NURSE PRACTITIONER

## 2020-05-03 PROCEDURE — 94762 N-INVAS EAR/PLS OXIMTRY CONT: CPT

## 2020-05-03 PROCEDURE — 99233 SBSQ HOSP IP/OBS HIGH 50: CPT | Performed by: INTERNAL MEDICINE

## 2020-05-03 PROCEDURE — 80048 BASIC METABOLIC PNL TOTAL CA: CPT | Performed by: INTERNAL MEDICINE

## 2020-05-03 RX ORDER — CARVEDILOL 6.25 MG/1
6.25 TABLET ORAL 2 TIMES DAILY WITH MEALS
Status: DISCONTINUED | OUTPATIENT
Start: 2020-05-03 | End: 2020-05-04

## 2020-05-03 RX ORDER — CEFTRIAXONE 1 G/50ML
1000 INJECTION, SOLUTION INTRAVENOUS EVERY 24 HOURS
Status: DISCONTINUED | OUTPATIENT
Start: 2020-05-03 | End: 2020-05-04

## 2020-05-03 RX ORDER — FUROSEMIDE 10 MG/ML
20 INJECTION INTRAMUSCULAR; INTRAVENOUS ONCE
Status: COMPLETED | OUTPATIENT
Start: 2020-05-03 | End: 2020-05-03

## 2020-05-03 RX ADMIN — HEPARIN SODIUM 7500 UNITS: 5000 INJECTION INTRAVENOUS; SUBCUTANEOUS at 22:28

## 2020-05-03 RX ADMIN — LISINOPRIL 5 MG: 5 TABLET ORAL at 09:50

## 2020-05-03 RX ADMIN — HEPARIN SODIUM 7500 UNITS: 5000 INJECTION INTRAVENOUS; SUBCUTANEOUS at 05:02

## 2020-05-03 RX ADMIN — SENNOSIDES AND DOCUSATE SODIUM 2 TABLET: 8.6; 5 TABLET ORAL at 09:49

## 2020-05-03 RX ADMIN — POLYETHYLENE GLYCOL 3350 17 G: 17 POWDER, FOR SOLUTION ORAL at 09:50

## 2020-05-03 RX ADMIN — INFLUENZA A VIRUS A/BRISBANE/02/2018 (H1N1) RECOMBINANT HEMAGGLUTININ ANTIGEN, INFLUENZA A VIRUS A/KANSAS/14/2017 (H3N2) RECOMBINANT HEMAGGLUTININ ANTIGEN, INFLUENZA B VIRUS B/PHUKET/3073/2013 RECOMBINANT HEMAGGLUTININ ANTIGEN, AND INFLUENZA B VIRUS B/MARYLAND/15/2016 RECOMBINANT HEMAGGLUTININ ANTIGEN 0.5 ML: 45; 45; 45; 45 INJECTION INTRAMUSCULAR at 10:48

## 2020-05-03 RX ADMIN — FOLIC ACID 1 MG: 1 TABLET ORAL at 09:49

## 2020-05-03 RX ADMIN — Medication 100 MG: at 09:49

## 2020-05-03 RX ADMIN — SPIRONOLACTONE 25 MG: 25 TABLET ORAL at 09:49

## 2020-05-03 RX ADMIN — CEFTRIAXONE 1000 MG: 1 INJECTION, SOLUTION INTRAVENOUS at 19:07

## 2020-05-03 RX ADMIN — SENNOSIDES AND DOCUSATE SODIUM 2 TABLET: 8.6; 5 TABLET ORAL at 19:06

## 2020-05-03 RX ADMIN — PANTOPRAZOLE SODIUM 40 MG: 40 TABLET, DELAYED RELEASE ORAL at 05:02

## 2020-05-03 RX ADMIN — Medication 1 TABLET: at 09:49

## 2020-05-03 RX ADMIN — MUPIROCIN 1 APPLICATION: 20 OINTMENT TOPICAL at 21:56

## 2020-05-03 RX ADMIN — METOPROLOL TARTRATE 25 MG: 25 TABLET, FILM COATED ORAL at 09:49

## 2020-05-03 RX ADMIN — ASPIRIN 81 MG 81 MG: 81 TABLET ORAL at 09:49

## 2020-05-03 RX ADMIN — HEPARIN SODIUM 7500 UNITS: 5000 INJECTION INTRAVENOUS; SUBCUTANEOUS at 14:41

## 2020-05-03 RX ADMIN — CARVEDILOL 6.25 MG: 6.25 TABLET, FILM COATED ORAL at 19:07

## 2020-05-03 RX ADMIN — FUROSEMIDE 20 MG: 10 INJECTION, SOLUTION INTRAMUSCULAR; INTRAVENOUS at 14:41

## 2020-05-04 ENCOUNTER — APPOINTMENT (OUTPATIENT)
Dept: NON INVASIVE DIAGNOSTICS | Facility: HOSPITAL | Age: 50
DRG: 192 | End: 2020-05-04
Payer: COMMERCIAL

## 2020-05-04 PROBLEM — I50.43 ACUTE ON CHRONIC COMBINED SYSTOLIC AND DIASTOLIC CHF (CONGESTIVE HEART FAILURE) (HCC): Status: ACTIVE | Noted: 2020-05-04

## 2020-05-04 PROBLEM — I50.23 ACUTE ON CHRONIC SYSTOLIC (CONGESTIVE) HEART FAILURE (HCC): Status: ACTIVE | Noted: 2020-05-04

## 2020-05-04 LAB
ALBUMIN SERPL BCP-MCNC: 2.9 G/DL (ref 3.5–5)
ALP SERPL-CCNC: 59 U/L (ref 46–116)
ALT SERPL W P-5'-P-CCNC: 31 U/L (ref 12–78)
ANION GAP SERPL CALCULATED.3IONS-SCNC: 5 MMOL/L (ref 4–13)
ANION GAP SERPL CALCULATED.3IONS-SCNC: 7 MMOL/L (ref 4–13)
AST SERPL W P-5'-P-CCNC: 17 U/L (ref 5–45)
BILIRUB SERPL-MCNC: 0.4 MG/DL (ref 0.2–1)
BUN SERPL-MCNC: 17 MG/DL (ref 5–25)
BUN SERPL-MCNC: 18 MG/DL (ref 5–25)
CALCIUM SERPL-MCNC: 8.3 MG/DL (ref 8.3–10.1)
CALCIUM SERPL-MCNC: 8.6 MG/DL (ref 8.3–10.1)
CHLORIDE SERPL-SCNC: 105 MMOL/L (ref 100–108)
CHLORIDE SERPL-SCNC: 108 MMOL/L (ref 100–108)
CHOLEST SERPL-MCNC: 115 MG/DL (ref 50–200)
CO2 SERPL-SCNC: 30 MMOL/L (ref 21–32)
CO2 SERPL-SCNC: 31 MMOL/L (ref 21–32)
CREAT SERPL-MCNC: 1.33 MG/DL (ref 0.6–1.3)
CREAT SERPL-MCNC: 1.39 MG/DL (ref 0.6–1.3)
GFR SERPL CREATININE-BSD FRML MDRD: 58 ML/MIN/1.73SQ M
GFR SERPL CREATININE-BSD FRML MDRD: 61 ML/MIN/1.73SQ M
GLUCOSE SERPL-MCNC: 106 MG/DL (ref 65–140)
GLUCOSE SERPL-MCNC: 107 MG/DL (ref 65–140)
GLUCOSE SERPL-MCNC: 98 MG/DL (ref 65–140)
HDLC SERPL-MCNC: 32 MG/DL
LDLC SERPL CALC-MCNC: 57 MG/DL (ref 0–100)
NONHDLC SERPL-MCNC: 83 MG/DL
POTASSIUM SERPL-SCNC: 3.9 MMOL/L (ref 3.5–5.3)
POTASSIUM SERPL-SCNC: 4 MMOL/L (ref 3.5–5.3)
PROCALCITONIN SERPL-MCNC: <0.05 NG/ML
PROT SERPL-MCNC: 6.3 G/DL (ref 6.4–8.2)
SODIUM SERPL-SCNC: 142 MMOL/L (ref 136–145)
SODIUM SERPL-SCNC: 144 MMOL/L (ref 136–145)
TRIGL SERPL-MCNC: 130 MG/DL

## 2020-05-04 PROCEDURE — 80061 LIPID PANEL: CPT | Performed by: INTERNAL MEDICINE

## 2020-05-04 PROCEDURE — C1894 INTRO/SHEATH, NON-LASER: HCPCS

## 2020-05-04 PROCEDURE — 82948 REAGENT STRIP/BLOOD GLUCOSE: CPT

## 2020-05-04 PROCEDURE — 94003 VENT MGMT INPAT SUBQ DAY: CPT

## 2020-05-04 PROCEDURE — 93458 L HRT ARTERY/VENTRICLE ANGIO: CPT | Performed by: INTERNAL MEDICINE

## 2020-05-04 PROCEDURE — 99233 SBSQ HOSP IP/OBS HIGH 50: CPT | Performed by: NURSE PRACTITIONER

## 2020-05-04 PROCEDURE — C1769 GUIDE WIRE: HCPCS

## 2020-05-04 PROCEDURE — 93454 CORONARY ARTERY ANGIO S&I: CPT

## 2020-05-04 PROCEDURE — B2151ZZ FLUOROSCOPY OF LEFT HEART USING LOW OSMOLAR CONTRAST: ICD-10-PCS | Performed by: INTERNAL MEDICINE

## 2020-05-04 PROCEDURE — 94660 CPAP INITIATION&MGMT: CPT

## 2020-05-04 PROCEDURE — 84145 PROCALCITONIN (PCT): CPT | Performed by: INTERNAL MEDICINE

## 2020-05-04 PROCEDURE — 99152 MOD SED SAME PHYS/QHP 5/>YRS: CPT | Performed by: INTERNAL MEDICINE

## 2020-05-04 PROCEDURE — B2111ZZ FLUOROSCOPY OF MULTIPLE CORONARY ARTERIES USING LOW OSMOLAR CONTRAST: ICD-10-PCS | Performed by: INTERNAL MEDICINE

## 2020-05-04 PROCEDURE — 94760 N-INVAS EAR/PLS OXIMETRY 1: CPT

## 2020-05-04 PROCEDURE — 99233 SBSQ HOSP IP/OBS HIGH 50: CPT | Performed by: INTERNAL MEDICINE

## 2020-05-04 PROCEDURE — 80053 COMPREHEN METABOLIC PANEL: CPT | Performed by: INTERNAL MEDICINE

## 2020-05-04 PROCEDURE — 80048 BASIC METABOLIC PNL TOTAL CA: CPT | Performed by: NURSE PRACTITIONER

## 2020-05-04 PROCEDURE — 99233 SBSQ HOSP IP/OBS HIGH 50: CPT | Performed by: PHYSICIAN ASSISTANT

## 2020-05-04 PROCEDURE — 4A023N7 MEASUREMENT OF CARDIAC SAMPLING AND PRESSURE, LEFT HEART, PERCUTANEOUS APPROACH: ICD-10-PCS | Performed by: INTERNAL MEDICINE

## 2020-05-04 RX ORDER — LIDOCAINE HYDROCHLORIDE 10 MG/ML
INJECTION, SOLUTION EPIDURAL; INFILTRATION; INTRACAUDAL; PERINEURAL CODE/TRAUMA/SEDATION MEDICATION
Status: COMPLETED | OUTPATIENT
Start: 2020-05-04 | End: 2020-05-04

## 2020-05-04 RX ORDER — VERAPAMIL HYDROCHLORIDE 2.5 MG/ML
INJECTION, SOLUTION INTRAVENOUS CODE/TRAUMA/SEDATION MEDICATION
Status: COMPLETED | OUTPATIENT
Start: 2020-05-04 | End: 2020-05-04

## 2020-05-04 RX ORDER — NITROGLYCERIN 20 MG/100ML
INJECTION INTRAVENOUS CODE/TRAUMA/SEDATION MEDICATION
Status: COMPLETED | OUTPATIENT
Start: 2020-05-04 | End: 2020-05-04

## 2020-05-04 RX ORDER — ATORVASTATIN CALCIUM 40 MG/1
40 TABLET, FILM COATED ORAL
Status: DISCONTINUED | OUTPATIENT
Start: 2020-05-04 | End: 2020-05-07 | Stop reason: HOSPADM

## 2020-05-04 RX ORDER — FUROSEMIDE 10 MG/ML
5 SYRINGE (ML) INJECTION CONTINUOUS
Status: DISCONTINUED | OUTPATIENT
Start: 2020-05-04 | End: 2020-05-06

## 2020-05-04 RX ORDER — SODIUM CHLORIDE 9 MG/ML
50 INJECTION, SOLUTION INTRAVENOUS CONTINUOUS
Status: DISCONTINUED | OUTPATIENT
Start: 2020-05-04 | End: 2020-05-04 | Stop reason: SDUPTHER

## 2020-05-04 RX ORDER — HEPARIN SODIUM 1000 [USP'U]/ML
INJECTION, SOLUTION INTRAVENOUS; SUBCUTANEOUS CODE/TRAUMA/SEDATION MEDICATION
Status: COMPLETED | OUTPATIENT
Start: 2020-05-04 | End: 2020-05-04

## 2020-05-04 RX ORDER — FUROSEMIDE 10 MG/ML
INJECTION INTRAMUSCULAR; INTRAVENOUS CODE/TRAUMA/SEDATION MEDICATION
Status: COMPLETED | OUTPATIENT
Start: 2020-05-04 | End: 2020-05-04

## 2020-05-04 RX ORDER — MIDAZOLAM HYDROCHLORIDE 2 MG/2ML
INJECTION, SOLUTION INTRAMUSCULAR; INTRAVENOUS CODE/TRAUMA/SEDATION MEDICATION
Status: COMPLETED | OUTPATIENT
Start: 2020-05-04 | End: 2020-05-04

## 2020-05-04 RX ORDER — SODIUM CHLORIDE 9 MG/ML
50 INJECTION, SOLUTION INTRAVENOUS CONTINUOUS
Status: DISCONTINUED | OUTPATIENT
Start: 2020-05-04 | End: 2020-05-05

## 2020-05-04 RX ORDER — CARVEDILOL 12.5 MG/1
12.5 TABLET ORAL 2 TIMES DAILY WITH MEALS
Status: DISCONTINUED | OUTPATIENT
Start: 2020-05-04 | End: 2020-05-06

## 2020-05-04 RX ORDER — FENTANYL CITRATE 50 UG/ML
INJECTION, SOLUTION INTRAMUSCULAR; INTRAVENOUS CODE/TRAUMA/SEDATION MEDICATION
Status: COMPLETED | OUTPATIENT
Start: 2020-05-04 | End: 2020-05-04

## 2020-05-04 RX ADMIN — FENTANYL CITRATE 0.25 MCG: 50 INJECTION, SOLUTION INTRAMUSCULAR; INTRAVENOUS at 15:03

## 2020-05-04 RX ADMIN — Medication 1 TABLET: at 09:54

## 2020-05-04 RX ADMIN — FOLIC ACID 1 MG: 1 TABLET ORAL at 09:54

## 2020-05-04 RX ADMIN — SODIUM CHLORIDE 50 ML/HR: 0.9 INJECTION, SOLUTION INTRAVENOUS at 16:04

## 2020-05-04 RX ADMIN — MUPIROCIN 1 APPLICATION: 20 OINTMENT TOPICAL at 21:28

## 2020-05-04 RX ADMIN — LISINOPRIL 5 MG: 5 TABLET ORAL at 09:54

## 2020-05-04 RX ADMIN — CARVEDILOL 6.25 MG: 6.25 TABLET, FILM COATED ORAL at 07:56

## 2020-05-04 RX ADMIN — VANCOMYCIN HYDROCHLORIDE 1500 MG: 10 INJECTION, POWDER, LYOPHILIZED, FOR SOLUTION INTRAVENOUS at 17:25

## 2020-05-04 RX ADMIN — CARVEDILOL 12.5 MG: 12.5 TABLET, FILM COATED ORAL at 17:04

## 2020-05-04 RX ADMIN — HEPARIN SODIUM 7500 UNITS: 5000 INJECTION INTRAVENOUS; SUBCUTANEOUS at 05:00

## 2020-05-04 RX ADMIN — SPIRONOLACTONE 25 MG: 25 TABLET ORAL at 09:54

## 2020-05-04 RX ADMIN — CEFTRIAXONE 1000 MG: 1 INJECTION, SOLUTION INTRAVENOUS at 16:01

## 2020-05-04 RX ADMIN — HEPARIN SODIUM 4000 UNITS: 1000 INJECTION INTRAVENOUS; SUBCUTANEOUS at 15:06

## 2020-05-04 RX ADMIN — FUROSEMIDE 20 MG: 10 INJECTION, SOLUTION INTRAMUSCULAR; INTRAVENOUS at 15:19

## 2020-05-04 RX ADMIN — SENNOSIDES AND DOCUSATE SODIUM 2 TABLET: 8.6; 5 TABLET ORAL at 09:54

## 2020-05-04 RX ADMIN — ASPIRIN 81 MG 81 MG: 81 TABLET ORAL at 09:54

## 2020-05-04 RX ADMIN — PANTOPRAZOLE SODIUM 40 MG: 40 TABLET, DELAYED RELEASE ORAL at 05:00

## 2020-05-04 RX ADMIN — VERAPAMIL HYDROCHLORIDE 2.5 MG: 2.5 INJECTION, SOLUTION INTRAVENOUS at 15:06

## 2020-05-04 RX ADMIN — ATORVASTATIN CALCIUM 40 MG: 40 TABLET, FILM COATED ORAL at 17:04

## 2020-05-04 RX ADMIN — MIDAZOLAM HYDROCHLORIDE 0.5 MG: 1 INJECTION, SOLUTION INTRAMUSCULAR; INTRAVENOUS at 15:03

## 2020-05-04 RX ADMIN — SODIUM CHLORIDE 50 ML/HR: 0.9 INJECTION, SOLUTION INTRAVENOUS at 07:50

## 2020-05-04 RX ADMIN — MUPIROCIN 1 APPLICATION: 20 OINTMENT TOPICAL at 09:54

## 2020-05-04 RX ADMIN — NITROGLYCERIN 200 MCG: 20 INJECTION INTRAVENOUS at 15:05

## 2020-05-04 RX ADMIN — VANCOMYCIN HYDROCHLORIDE 1500 MG: 10 INJECTION, POWDER, LYOPHILIZED, FOR SOLUTION INTRAVENOUS at 04:48

## 2020-05-04 RX ADMIN — MIDAZOLAM HYDROCHLORIDE 0.5 MG: 1 INJECTION, SOLUTION INTRAMUSCULAR; INTRAVENOUS at 15:11

## 2020-05-04 RX ADMIN — LIDOCAINE HYDROCHLORIDE 2 ML: 10 INJECTION, SOLUTION EPIDURAL; INFILTRATION; INTRACAUDAL; PERINEURAL at 15:03

## 2020-05-04 RX ADMIN — IOHEXOL 65 ML: 350 INJECTION, SOLUTION INTRAVENOUS at 15:22

## 2020-05-04 RX ADMIN — Medication 3 MG/HR: at 19:42

## 2020-05-04 RX ADMIN — MIDAZOLAM HYDROCHLORIDE 0.5 MG: 1 INJECTION, SOLUTION INTRAMUSCULAR; INTRAVENOUS at 15:05

## 2020-05-04 RX ADMIN — HEPARIN SODIUM 7500 UNITS: 5000 INJECTION INTRAVENOUS; SUBCUTANEOUS at 21:28

## 2020-05-04 RX ADMIN — SENNOSIDES AND DOCUSATE SODIUM 2 TABLET: 8.6; 5 TABLET ORAL at 17:05

## 2020-05-04 RX ADMIN — POLYETHYLENE GLYCOL 3350 17 G: 17 POWDER, FOR SOLUTION ORAL at 09:54

## 2020-05-04 RX ADMIN — Medication 100 MG: at 09:54

## 2020-05-05 LAB
ANION GAP SERPL CALCULATED.3IONS-SCNC: 10 MMOL/L (ref 4–13)
BACTERIA BLD CULT: ABNORMAL
BASOPHILS # BLD AUTO: 0.05 THOUSANDS/ΜL (ref 0–0.1)
BASOPHILS NFR BLD AUTO: 1 % (ref 0–1)
BUN SERPL-MCNC: 15 MG/DL (ref 5–25)
CALCIUM SERPL-MCNC: 8.5 MG/DL (ref 8.3–10.1)
CHLORIDE SERPL-SCNC: 103 MMOL/L (ref 100–108)
CO2 SERPL-SCNC: 27 MMOL/L (ref 21–32)
CREAT SERPL-MCNC: 1.31 MG/DL (ref 0.6–1.3)
EOSINOPHIL # BLD AUTO: 0.31 THOUSAND/ΜL (ref 0–0.61)
EOSINOPHIL NFR BLD AUTO: 5 % (ref 0–6)
ERYTHROCYTE [DISTWIDTH] IN BLOOD BY AUTOMATED COUNT: 13.4 % (ref 11.6–15.1)
GFR SERPL CREATININE-BSD FRML MDRD: 63 ML/MIN/1.73SQ M
GLUCOSE SERPL-MCNC: 105 MG/DL (ref 65–140)
GRAM STN SPEC: ABNORMAL
HCT VFR BLD AUTO: 49.4 % (ref 36.5–49.3)
HGB BLD-MCNC: 14.7 G/DL (ref 12–17)
IMM GRANULOCYTES # BLD AUTO: 0.02 THOUSAND/UL (ref 0–0.2)
IMM GRANULOCYTES NFR BLD AUTO: 0 % (ref 0–2)
LYMPHOCYTES # BLD AUTO: 1.16 THOUSANDS/ΜL (ref 0.6–4.47)
LYMPHOCYTES NFR BLD AUTO: 18 % (ref 14–44)
MCH RBC QN AUTO: 29.6 PG (ref 26.8–34.3)
MCHC RBC AUTO-ENTMCNC: 29.8 G/DL (ref 31.4–37.4)
MCV RBC AUTO: 99 FL (ref 82–98)
MONOCYTES # BLD AUTO: 0.75 THOUSAND/ΜL (ref 0.17–1.22)
MONOCYTES NFR BLD AUTO: 11 % (ref 4–12)
NEUTROPHILS # BLD AUTO: 4.32 THOUSANDS/ΜL (ref 1.85–7.62)
NEUTS SEG NFR BLD AUTO: 65 % (ref 43–75)
NRBC BLD AUTO-RTO: 0 /100 WBCS
PLATELET # BLD AUTO: 187 THOUSANDS/UL (ref 149–390)
PMV BLD AUTO: 10.6 FL (ref 8.9–12.7)
POTASSIUM SERPL-SCNC: 3.9 MMOL/L (ref 3.5–5.3)
RBC # BLD AUTO: 4.97 MILLION/UL (ref 3.88–5.62)
SODIUM SERPL-SCNC: 140 MMOL/L (ref 136–145)
WBC # BLD AUTO: 6.61 THOUSAND/UL (ref 4.31–10.16)

## 2020-05-05 PROCEDURE — 94760 N-INVAS EAR/PLS OXIMETRY 1: CPT

## 2020-05-05 PROCEDURE — 99254 IP/OBS CNSLTJ NEW/EST MOD 60: CPT | Performed by: INTERNAL MEDICINE

## 2020-05-05 PROCEDURE — 80048 BASIC METABOLIC PNL TOTAL CA: CPT | Performed by: NURSE PRACTITIONER

## 2020-05-05 PROCEDURE — 85025 COMPLETE CBC W/AUTO DIFF WBC: CPT | Performed by: INTERNAL MEDICINE

## 2020-05-05 PROCEDURE — 99232 SBSQ HOSP IP/OBS MODERATE 35: CPT | Performed by: FAMILY MEDICINE

## 2020-05-05 PROCEDURE — 97110 THERAPEUTIC EXERCISES: CPT

## 2020-05-05 PROCEDURE — 99232 SBSQ HOSP IP/OBS MODERATE 35: CPT | Performed by: INTERNAL MEDICINE

## 2020-05-05 PROCEDURE — 94003 VENT MGMT INPAT SUBQ DAY: CPT

## 2020-05-05 PROCEDURE — 99233 SBSQ HOSP IP/OBS HIGH 50: CPT | Performed by: INTERNAL MEDICINE

## 2020-05-05 PROCEDURE — 94762 N-INVAS EAR/PLS OXIMTRY CONT: CPT

## 2020-05-05 RX ORDER — AMLODIPINE BESYLATE 2.5 MG/1
2.5 TABLET ORAL DAILY
Status: DISCONTINUED | OUTPATIENT
Start: 2020-05-05 | End: 2020-05-06

## 2020-05-05 RX ADMIN — LISINOPRIL 5 MG: 5 TABLET ORAL at 08:23

## 2020-05-05 RX ADMIN — AMLODIPINE BESYLATE 2.5 MG: 2.5 TABLET ORAL at 12:00

## 2020-05-05 RX ADMIN — ATORVASTATIN CALCIUM 40 MG: 40 TABLET, FILM COATED ORAL at 17:14

## 2020-05-05 RX ADMIN — MUPIROCIN 1 APPLICATION: 20 OINTMENT TOPICAL at 21:58

## 2020-05-05 RX ADMIN — SENNOSIDES AND DOCUSATE SODIUM 2 TABLET: 8.6; 5 TABLET ORAL at 17:23

## 2020-05-05 RX ADMIN — PANTOPRAZOLE SODIUM 40 MG: 40 TABLET, DELAYED RELEASE ORAL at 05:39

## 2020-05-05 RX ADMIN — Medication 1 TABLET: at 08:23

## 2020-05-05 RX ADMIN — MUPIROCIN 1 APPLICATION: 20 OINTMENT TOPICAL at 08:25

## 2020-05-05 RX ADMIN — FOLIC ACID 1 MG: 1 TABLET ORAL at 08:25

## 2020-05-05 RX ADMIN — Medication 100 MG: at 08:24

## 2020-05-05 RX ADMIN — CARVEDILOL 12.5 MG: 12.5 TABLET, FILM COATED ORAL at 17:14

## 2020-05-05 RX ADMIN — CARVEDILOL 12.5 MG: 12.5 TABLET, FILM COATED ORAL at 08:24

## 2020-05-05 RX ADMIN — SPIRONOLACTONE 25 MG: 25 TABLET ORAL at 08:24

## 2020-05-05 RX ADMIN — SENNOSIDES AND DOCUSATE SODIUM 2 TABLET: 8.6; 5 TABLET ORAL at 08:24

## 2020-05-05 RX ADMIN — HEPARIN SODIUM 7500 UNITS: 5000 INJECTION INTRAVENOUS; SUBCUTANEOUS at 21:59

## 2020-05-05 RX ADMIN — HEPARIN SODIUM 7500 UNITS: 5000 INJECTION INTRAVENOUS; SUBCUTANEOUS at 17:13

## 2020-05-05 RX ADMIN — POLYETHYLENE GLYCOL 3350 17 G: 17 POWDER, FOR SOLUTION ORAL at 08:25

## 2020-05-05 RX ADMIN — VANCOMYCIN HYDROCHLORIDE 1500 MG: 10 INJECTION, POWDER, LYOPHILIZED, FOR SOLUTION INTRAVENOUS at 04:23

## 2020-05-05 RX ADMIN — ASPIRIN 81 MG 81 MG: 81 TABLET ORAL at 08:24

## 2020-05-05 RX ADMIN — HEPARIN SODIUM 7500 UNITS: 5000 INJECTION INTRAVENOUS; SUBCUTANEOUS at 05:38

## 2020-05-06 PROBLEM — I27.21 SECONDARY PULMONARY ARTERIAL HYPERTENSION (HCC): Status: ACTIVE | Noted: 2020-05-06

## 2020-05-06 LAB
ANION GAP SERPL CALCULATED.3IONS-SCNC: 8 MMOL/L (ref 4–13)
BACTERIA BLD CULT: NORMAL
BUN SERPL-MCNC: 21 MG/DL (ref 5–25)
CALCIUM SERPL-MCNC: 9.1 MG/DL (ref 8.3–10.1)
CHLORIDE SERPL-SCNC: 101 MMOL/L (ref 100–108)
CO2 SERPL-SCNC: 33 MMOL/L (ref 21–32)
CREAT SERPL-MCNC: 1.54 MG/DL (ref 0.6–1.3)
GFR SERPL CREATININE-BSD FRML MDRD: 51 ML/MIN/1.73SQ M
GLUCOSE SERPL-MCNC: 102 MG/DL (ref 65–140)
POTASSIUM SERPL-SCNC: 3.7 MMOL/L (ref 3.5–5.3)
SODIUM SERPL-SCNC: 142 MMOL/L (ref 136–145)

## 2020-05-06 PROCEDURE — 94760 N-INVAS EAR/PLS OXIMETRY 1: CPT

## 2020-05-06 PROCEDURE — 97535 SELF CARE MNGMENT TRAINING: CPT

## 2020-05-06 PROCEDURE — 97110 THERAPEUTIC EXERCISES: CPT

## 2020-05-06 PROCEDURE — 94003 VENT MGMT INPAT SUBQ DAY: CPT

## 2020-05-06 PROCEDURE — 80048 BASIC METABOLIC PNL TOTAL CA: CPT | Performed by: FAMILY MEDICINE

## 2020-05-06 PROCEDURE — 99233 SBSQ HOSP IP/OBS HIGH 50: CPT | Performed by: INTERNAL MEDICINE

## 2020-05-06 PROCEDURE — 99232 SBSQ HOSP IP/OBS MODERATE 35: CPT | Performed by: FAMILY MEDICINE

## 2020-05-06 PROCEDURE — 99232 SBSQ HOSP IP/OBS MODERATE 35: CPT | Performed by: INTERNAL MEDICINE

## 2020-05-06 RX ORDER — CARVEDILOL 25 MG/1
25 TABLET ORAL 2 TIMES DAILY WITH MEALS
Status: DISCONTINUED | OUTPATIENT
Start: 2020-05-06 | End: 2020-05-07 | Stop reason: HOSPADM

## 2020-05-06 RX ADMIN — Medication 100 MG: at 08:08

## 2020-05-06 RX ADMIN — CARVEDILOL 25 MG: 25 TABLET, FILM COATED ORAL at 17:29

## 2020-05-06 RX ADMIN — SPIRONOLACTONE 25 MG: 25 TABLET ORAL at 08:08

## 2020-05-06 RX ADMIN — HEPARIN SODIUM 7500 UNITS: 5000 INJECTION INTRAVENOUS; SUBCUTANEOUS at 05:30

## 2020-05-06 RX ADMIN — HEPARIN SODIUM 7500 UNITS: 5000 INJECTION INTRAVENOUS; SUBCUTANEOUS at 17:28

## 2020-05-06 RX ADMIN — ASPIRIN 81 MG 81 MG: 81 TABLET ORAL at 08:08

## 2020-05-06 RX ADMIN — SENNOSIDES AND DOCUSATE SODIUM 2 TABLET: 8.6; 5 TABLET ORAL at 17:29

## 2020-05-06 RX ADMIN — MUPIROCIN 1 APPLICATION: 20 OINTMENT TOPICAL at 09:54

## 2020-05-06 RX ADMIN — MUPIROCIN 1 APPLICATION: 20 OINTMENT TOPICAL at 21:19

## 2020-05-06 RX ADMIN — Medication 1 TABLET: at 08:08

## 2020-05-06 RX ADMIN — ATORVASTATIN CALCIUM 40 MG: 40 TABLET, FILM COATED ORAL at 17:29

## 2020-05-06 RX ADMIN — LISINOPRIL 5 MG: 5 TABLET ORAL at 08:08

## 2020-05-06 RX ADMIN — FOLIC ACID 1 MG: 1 TABLET ORAL at 08:08

## 2020-05-06 RX ADMIN — POLYETHYLENE GLYCOL 3350 17 G: 17 POWDER, FOR SOLUTION ORAL at 08:08

## 2020-05-06 RX ADMIN — AMLODIPINE BESYLATE 2.5 MG: 2.5 TABLET ORAL at 08:08

## 2020-05-06 RX ADMIN — HEPARIN SODIUM 7500 UNITS: 5000 INJECTION INTRAVENOUS; SUBCUTANEOUS at 21:18

## 2020-05-06 RX ADMIN — CARVEDILOL 12.5 MG: 12.5 TABLET, FILM COATED ORAL at 08:08

## 2020-05-06 RX ADMIN — PANTOPRAZOLE SODIUM 40 MG: 40 TABLET, DELAYED RELEASE ORAL at 05:30

## 2020-05-06 RX ADMIN — SENNOSIDES AND DOCUSATE SODIUM 2 TABLET: 8.6; 5 TABLET ORAL at 08:08

## 2020-05-07 VITALS
WEIGHT: 244.49 LBS | HEART RATE: 74 BPM | BODY MASS INDEX: 35 KG/M2 | RESPIRATION RATE: 20 BRPM | OXYGEN SATURATION: 95 % | TEMPERATURE: 98.6 F | DIASTOLIC BLOOD PRESSURE: 72 MMHG | HEIGHT: 70 IN | SYSTOLIC BLOOD PRESSURE: 122 MMHG

## 2020-05-07 PROBLEM — N17.9 AKI (ACUTE KIDNEY INJURY) (HCC): Status: RESOLVED | Noted: 2020-05-01 | Resolved: 2020-05-07

## 2020-05-07 PROBLEM — I10 UNCONTROLLED HYPERTENSION: Status: RESOLVED | Noted: 2020-05-01 | Resolved: 2020-05-07

## 2020-05-07 PROBLEM — I50.43 ACUTE ON CHRONIC COMBINED SYSTOLIC AND DIASTOLIC CHF (CONGESTIVE HEART FAILURE) (HCC): Status: RESOLVED | Noted: 2020-05-04 | Resolved: 2020-05-07

## 2020-05-07 PROBLEM — R78.81 BACTEREMIA: Status: RESOLVED | Noted: 2020-05-03 | Resolved: 2020-05-07

## 2020-05-07 PROBLEM — J96.00 ACUTE RESPIRATORY FAILURE (HCC): Status: RESOLVED | Noted: 2020-05-01 | Resolved: 2020-05-07

## 2020-05-07 LAB
ANION GAP SERPL CALCULATED.3IONS-SCNC: 8 MMOL/L (ref 4–13)
ARTERIAL PATENCY WRIST A: YES
BASE EXCESS BLDA CALC-SCNC: 3 MMOL/L
BODY TEMPERATURE: 97.1 DEGREES FEHRENHEIT
BUN SERPL-MCNC: 23 MG/DL (ref 5–25)
CALCIUM SERPL-MCNC: 9.3 MG/DL (ref 8.3–10.1)
CHLORIDE SERPL-SCNC: 101 MMOL/L (ref 100–108)
CO2 SERPL-SCNC: 30 MMOL/L (ref 21–32)
CREAT SERPL-MCNC: 1.53 MG/DL (ref 0.6–1.3)
GFR SERPL CREATININE-BSD FRML MDRD: 52 ML/MIN/1.73SQ M
GLUCOSE SERPL-MCNC: 132 MG/DL (ref 65–140)
HCO3 BLDA-SCNC: 27.6 MMOL/L (ref 22–28)
IPAP: 16
NON VENT- BIPAP: NORMAL
O2 CT BLDA-SCNC: 21.9 ML/DL (ref 16–23)
OXYHGB MFR BLDA: 96.1 % (ref 94–97)
PCO2 BLDA: 42.2 MM HG (ref 36–44)
PCO2 TEMP ADJ BLDA: 40.7 MM HG (ref 36–44)
PEEP MAX SETTING VENT: 5 CM[H2O]
PH BLD: 7.45 [PH] (ref 7.35–7.45)
PH BLDA: 7.43 [PH] (ref 7.35–7.45)
PO2 BLD: 92.5 MM HG (ref 75–129)
PO2 BLDA: 97.1 MM HG (ref 75–129)
POTASSIUM SERPL-SCNC: 4.5 MMOL/L (ref 3.5–5.3)
SODIUM SERPL-SCNC: 139 MMOL/L (ref 136–145)
SPECIMEN SOURCE: NORMAL
VENT BIPAP FIO2: 28 %

## 2020-05-07 PROCEDURE — 36600 WITHDRAWAL OF ARTERIAL BLOOD: CPT

## 2020-05-07 PROCEDURE — 99239 HOSP IP/OBS DSCHRG MGMT >30: CPT | Performed by: FAMILY MEDICINE

## 2020-05-07 PROCEDURE — 82805 BLOOD GASES W/O2 SATURATION: CPT | Performed by: PHYSICIAN ASSISTANT

## 2020-05-07 PROCEDURE — 94760 N-INVAS EAR/PLS OXIMETRY 1: CPT

## 2020-05-07 PROCEDURE — 99232 SBSQ HOSP IP/OBS MODERATE 35: CPT | Performed by: INTERNAL MEDICINE

## 2020-05-07 PROCEDURE — 94761 N-INVAS EAR/PLS OXIMETRY MLT: CPT

## 2020-05-07 PROCEDURE — 80048 BASIC METABOLIC PNL TOTAL CA: CPT | Performed by: STUDENT IN AN ORGANIZED HEALTH CARE EDUCATION/TRAINING PROGRAM

## 2020-05-07 RX ORDER — PANTOPRAZOLE SODIUM 40 MG/1
40 TABLET, DELAYED RELEASE ORAL
Qty: 30 TABLET | Refills: 0 | Status: SHIPPED | OUTPATIENT
Start: 2020-05-08 | End: 2020-09-30

## 2020-05-07 RX ORDER — LISINOPRIL 5 MG/1
5 TABLET ORAL DAILY
Qty: 30 TABLET | Refills: 0 | Status: SHIPPED | OUTPATIENT
Start: 2020-05-08 | End: 2020-06-08 | Stop reason: SDUPTHER

## 2020-05-07 RX ORDER — FUROSEMIDE 20 MG/1
20 TABLET ORAL DAILY
Status: DISCONTINUED | OUTPATIENT
Start: 2020-05-08 | End: 2020-05-07 | Stop reason: HOSPADM

## 2020-05-07 RX ORDER — FUROSEMIDE 10 MG/ML
20 SOLUTION ORAL DAILY
Status: DISCONTINUED | OUTPATIENT
Start: 2020-05-08 | End: 2020-05-07

## 2020-05-07 RX ORDER — SPIRONOLACTONE 25 MG/1
12.5 TABLET ORAL DAILY
Status: DISCONTINUED | OUTPATIENT
Start: 2020-05-08 | End: 2020-05-07 | Stop reason: HOSPADM

## 2020-05-07 RX ORDER — FUROSEMIDE 20 MG/1
20 TABLET ORAL DAILY
Qty: 30 TABLET | Refills: 0 | Status: ON HOLD | OUTPATIENT
Start: 2020-05-08 | End: 2020-05-18 | Stop reason: SDUPTHER

## 2020-05-07 RX ORDER — ASPIRIN 81 MG/1
81 TABLET, CHEWABLE ORAL DAILY
Qty: 30 TABLET | Refills: 0 | Status: SHIPPED | OUTPATIENT
Start: 2020-05-08

## 2020-05-07 RX ORDER — CARVEDILOL 25 MG/1
25 TABLET ORAL 2 TIMES DAILY WITH MEALS
Qty: 60 TABLET | Refills: 0 | Status: SHIPPED | OUTPATIENT
Start: 2020-05-07 | End: 2020-06-08 | Stop reason: SDUPTHER

## 2020-05-07 RX ORDER — SPIRONOLACTONE 25 MG/1
12.5 TABLET ORAL DAILY
Qty: 30 TABLET | Refills: 0 | Status: ON HOLD | OUTPATIENT
Start: 2020-05-08 | End: 2020-05-18 | Stop reason: SDUPTHER

## 2020-05-07 RX ORDER — ATORVASTATIN CALCIUM 40 MG/1
40 TABLET, FILM COATED ORAL
Qty: 30 TABLET | Refills: 0 | Status: SHIPPED | OUTPATIENT
Start: 2020-05-07 | End: 2020-06-08 | Stop reason: SDUPTHER

## 2020-05-07 RX ADMIN — LISINOPRIL 5 MG: 5 TABLET ORAL at 09:17

## 2020-05-07 RX ADMIN — ATORVASTATIN CALCIUM 40 MG: 40 TABLET, FILM COATED ORAL at 15:53

## 2020-05-07 RX ADMIN — CARVEDILOL 25 MG: 25 TABLET, FILM COATED ORAL at 09:00

## 2020-05-07 RX ADMIN — Medication 100 MG: at 09:17

## 2020-05-07 RX ADMIN — SPIRONOLACTONE 25 MG: 25 TABLET ORAL at 09:17

## 2020-05-07 RX ADMIN — HEPARIN SODIUM 7500 UNITS: 5000 INJECTION INTRAVENOUS; SUBCUTANEOUS at 05:58

## 2020-05-07 RX ADMIN — Medication 1 TABLET: at 09:17

## 2020-05-07 RX ADMIN — ASPIRIN 81 MG 81 MG: 81 TABLET ORAL at 09:17

## 2020-05-07 RX ADMIN — CARVEDILOL 25 MG: 25 TABLET, FILM COATED ORAL at 15:53

## 2020-05-07 RX ADMIN — POLYETHYLENE GLYCOL 3350 17 G: 17 POWDER, FOR SOLUTION ORAL at 09:17

## 2020-05-07 RX ADMIN — PANTOPRAZOLE SODIUM 40 MG: 40 TABLET, DELAYED RELEASE ORAL at 05:58

## 2020-05-07 RX ADMIN — FOLIC ACID 1 MG: 1 TABLET ORAL at 09:17

## 2020-05-07 RX ADMIN — SENNOSIDES AND DOCUSATE SODIUM 2 TABLET: 8.6; 5 TABLET ORAL at 09:17

## 2020-05-07 RX ADMIN — HEPARIN SODIUM 7500 UNITS: 5000 INJECTION INTRAVENOUS; SUBCUTANEOUS at 15:00

## 2020-05-07 RX ADMIN — MUPIROCIN 1 APPLICATION: 20 OINTMENT TOPICAL at 09:17

## 2020-05-08 ENCOUNTER — TRANSITIONAL CARE MANAGEMENT (OUTPATIENT)
Dept: FAMILY MEDICINE CLINIC | Facility: CLINIC | Age: 50
End: 2020-05-08

## 2020-05-08 LAB — BACTERIA BLD CULT: NORMAL

## 2020-05-12 ENCOUNTER — TELEPHONE (OUTPATIENT)
Dept: FAMILY MEDICINE CLINIC | Facility: CLINIC | Age: 50
End: 2020-05-12

## 2020-05-12 ENCOUNTER — TELEMEDICINE (OUTPATIENT)
Dept: FAMILY MEDICINE CLINIC | Facility: CLINIC | Age: 50
End: 2020-05-12
Payer: COMMERCIAL

## 2020-05-12 DIAGNOSIS — R10.9 ABDOMINAL DISCOMFORT: ICD-10-CM

## 2020-05-12 DIAGNOSIS — R19.7 DIARRHEA, UNSPECIFIED TYPE: ICD-10-CM

## 2020-05-12 DIAGNOSIS — R11.2 NAUSEA AND VOMITING, INTRACTABILITY OF VOMITING NOT SPECIFIED, UNSPECIFIED VOMITING TYPE: Primary | ICD-10-CM

## 2020-05-12 PROCEDURE — 99213 OFFICE O/P EST LOW 20 MIN: CPT | Performed by: FAMILY MEDICINE

## 2020-05-13 ENCOUNTER — TELEMEDICINE (OUTPATIENT)
Dept: FAMILY MEDICINE CLINIC | Facility: CLINIC | Age: 50
End: 2020-05-13
Payer: COMMERCIAL

## 2020-05-13 VITALS
SYSTOLIC BLOOD PRESSURE: 102 MMHG | WEIGHT: 226 LBS | HEART RATE: 84 BPM | DIASTOLIC BLOOD PRESSURE: 68 MMHG | BODY MASS INDEX: 32.43 KG/M2

## 2020-05-13 DIAGNOSIS — I50.43 ACUTE ON CHRONIC COMBINED SYSTOLIC AND DIASTOLIC CONGESTIVE HEART FAILURE (HCC): ICD-10-CM

## 2020-05-13 DIAGNOSIS — R19.7 DIARRHEA, UNSPECIFIED TYPE: ICD-10-CM

## 2020-05-13 DIAGNOSIS — J96.00 ACUTE RESPIRATORY FAILURE, UNSPECIFIED WHETHER WITH HYPOXIA OR HYPERCAPNIA (HCC): ICD-10-CM

## 2020-05-13 DIAGNOSIS — R11.2 NAUSEA AND VOMITING, INTRACTABILITY OF VOMITING NOT SPECIFIED, UNSPECIFIED VOMITING TYPE: ICD-10-CM

## 2020-05-13 DIAGNOSIS — I10 BENIGN ESSENTIAL HYPERTENSION: ICD-10-CM

## 2020-05-13 PROCEDURE — 99495 TRANSJ CARE MGMT MOD F2F 14D: CPT | Performed by: FAMILY MEDICINE

## 2020-05-13 RX ORDER — ONDANSETRON 4 MG/1
4 TABLET, FILM COATED ORAL EVERY 8 HOURS PRN
Qty: 20 TABLET | Refills: 0 | Status: SHIPPED | OUTPATIENT
Start: 2020-05-13 | End: 2020-09-18 | Stop reason: SDUPTHER

## 2020-05-16 ENCOUNTER — APPOINTMENT (EMERGENCY)
Dept: RADIOLOGY | Facility: HOSPITAL | Age: 50
DRG: 720 | End: 2020-05-16
Payer: COMMERCIAL

## 2020-05-16 ENCOUNTER — HOSPITAL ENCOUNTER (INPATIENT)
Facility: HOSPITAL | Age: 50
LOS: 2 days | Discharge: HOME | DRG: 720 | End: 2020-05-18
Attending: EMERGENCY MEDICINE | Admitting: INTERNAL MEDICINE
Payer: COMMERCIAL

## 2020-05-16 DIAGNOSIS — I50.43 ACUTE ON CHRONIC COMBINED SYSTOLIC AND DIASTOLIC CHF (CONGESTIVE HEART FAILURE) (HCC): ICD-10-CM

## 2020-05-16 DIAGNOSIS — K52.9 ENTEROCOLITIS: ICD-10-CM

## 2020-05-16 DIAGNOSIS — N17.9 ACUTE KIDNEY INJURY (HCC): ICD-10-CM

## 2020-05-16 DIAGNOSIS — R19.7 DIARRHEA: ICD-10-CM

## 2020-05-16 DIAGNOSIS — E83.39 HYPOPHOSPHATEMIA: ICD-10-CM

## 2020-05-16 DIAGNOSIS — E83.42 HYPOMAGNESEMIA: ICD-10-CM

## 2020-05-16 DIAGNOSIS — N17.9 AKI (ACUTE KIDNEY INJURY) (HCC): ICD-10-CM

## 2020-05-16 DIAGNOSIS — R11.10 VOMITING: ICD-10-CM

## 2020-05-16 DIAGNOSIS — A41.9 SEPSIS (HCC): Primary | ICD-10-CM

## 2020-05-16 PROBLEM — I25.10 CAD (CORONARY ARTERY DISEASE): Status: ACTIVE | Noted: 2020-05-16

## 2020-05-16 PROBLEM — R65.20 SEVERE SEPSIS (HCC): Status: ACTIVE | Noted: 2020-05-16

## 2020-05-16 PROBLEM — I50.42 CHRONIC COMBINED SYSTOLIC AND DIASTOLIC CONGESTIVE HEART FAILURE (HCC): Status: ACTIVE | Noted: 2020-05-04

## 2020-05-16 PROBLEM — G47.33 OSA (OBSTRUCTIVE SLEEP APNEA): Status: ACTIVE | Noted: 2020-05-16

## 2020-05-16 LAB
ALBUMIN SERPL BCP-MCNC: 3.5 G/DL (ref 3.5–5)
ALP SERPL-CCNC: 66 U/L (ref 46–116)
ALT SERPL W P-5'-P-CCNC: 25 U/L (ref 12–78)
ANION GAP SERPL CALCULATED.3IONS-SCNC: 12 MMOL/L (ref 4–13)
ANION GAP SERPL CALCULATED.3IONS-SCNC: 12 MMOL/L (ref 4–13)
AST SERPL W P-5'-P-CCNC: 16 U/L (ref 5–45)
BASE EX.OXY STD BLDV CALC-SCNC: 85.4 % (ref 60–80)
BASE EXCESS BLDV CALC-SCNC: -7.6 MMOL/L
BASOPHILS # BLD AUTO: 0.02 THOUSANDS/ΜL (ref 0–0.1)
BASOPHILS NFR BLD AUTO: 0 % (ref 0–1)
BILIRUB SERPL-MCNC: 0.8 MG/DL (ref 0.2–1)
BILIRUB UR QL STRIP: NEGATIVE
BUN SERPL-MCNC: 35 MG/DL (ref 5–25)
BUN SERPL-MCNC: 38 MG/DL (ref 5–25)
CALCIUM SERPL-MCNC: 8.1 MG/DL (ref 8.3–10.1)
CALCIUM SERPL-MCNC: 8.6 MG/DL (ref 8.3–10.1)
CHLORIDE SERPL-SCNC: 104 MMOL/L (ref 100–108)
CHLORIDE SERPL-SCNC: 104 MMOL/L (ref 100–108)
CLARITY UR: CLEAR
CO2 SERPL-SCNC: 18 MMOL/L (ref 21–32)
CO2 SERPL-SCNC: 20 MMOL/L (ref 21–32)
COLOR UR: NORMAL
CREAT SERPL-MCNC: 2.13 MG/DL (ref 0.6–1.3)
CREAT SERPL-MCNC: 2.94 MG/DL (ref 0.6–1.3)
EOSINOPHIL # BLD AUTO: 0.19 THOUSAND/ΜL (ref 0–0.61)
EOSINOPHIL NFR BLD AUTO: 3 % (ref 0–6)
ERYTHROCYTE [DISTWIDTH] IN BLOOD BY AUTOMATED COUNT: 13.1 % (ref 11.6–15.1)
GFR SERPL CREATININE-BSD FRML MDRD: 24 ML/MIN/1.73SQ M
GFR SERPL CREATININE-BSD FRML MDRD: 35 ML/MIN/1.73SQ M
GLUCOSE SERPL-MCNC: 91 MG/DL (ref 65–140)
GLUCOSE SERPL-MCNC: 98 MG/DL (ref 65–140)
GLUCOSE UR STRIP-MCNC: NEGATIVE MG/DL
HCO3 BLDV-SCNC: 16.8 MMOL/L (ref 24–30)
HCT VFR BLD AUTO: 48.1 % (ref 36.5–49.3)
HGB BLD-MCNC: 15.5 G/DL (ref 12–17)
HGB UR QL STRIP.AUTO: NEGATIVE
IMM GRANULOCYTES # BLD AUTO: 0.05 THOUSAND/UL (ref 0–0.2)
IMM GRANULOCYTES NFR BLD AUTO: 1 % (ref 0–2)
KETONES UR STRIP-MCNC: NEGATIVE MG/DL
LACTATE SERPL-SCNC: 1 MMOL/L (ref 0.5–2)
LEUKOCYTE ESTERASE UR QL STRIP: NEGATIVE
LIPASE SERPL-CCNC: 180 U/L (ref 73–393)
LYMPHOCYTES # BLD AUTO: 0.37 THOUSANDS/ΜL (ref 0.6–4.47)
LYMPHOCYTES NFR BLD AUTO: 6 % (ref 14–44)
MAGNESIUM SERPL-MCNC: 1.4 MG/DL (ref 1.6–2.6)
MCH RBC QN AUTO: 29.1 PG (ref 26.8–34.3)
MCHC RBC AUTO-ENTMCNC: 32.2 G/DL (ref 31.4–37.4)
MCV RBC AUTO: 90 FL (ref 82–98)
MONOCYTES # BLD AUTO: 0.1 THOUSAND/ΜL (ref 0.17–1.22)
MONOCYTES NFR BLD AUTO: 2 % (ref 4–12)
NEUTROPHILS # BLD AUTO: 5.37 THOUSANDS/ΜL (ref 1.85–7.62)
NEUTS SEG NFR BLD AUTO: 88 % (ref 43–75)
NITRITE UR QL STRIP: NEGATIVE
NRBC BLD AUTO-RTO: 0 /100 WBCS
O2 CT BLDV-SCNC: 19.3 ML/DL
PCO2 BLDV: 31.8 MM HG (ref 42–50)
PH BLDV: 7.34 [PH] (ref 7.3–7.4)
PH UR STRIP.AUTO: 5.5 [PH]
PHOSPHATE SERPL-MCNC: 1.9 MG/DL (ref 2.7–4.5)
PLATELET # BLD AUTO: 241 THOUSANDS/UL (ref 149–390)
PMV BLD AUTO: 11.1 FL (ref 8.9–12.7)
PO2 BLDV: 52.2 MM HG (ref 35–45)
POTASSIUM SERPL-SCNC: 4.4 MMOL/L (ref 3.5–5.3)
POTASSIUM SERPL-SCNC: 4.8 MMOL/L (ref 3.5–5.3)
PROCALCITONIN SERPL-MCNC: 0.92 NG/ML
PROCALCITONIN SERPL-MCNC: 38.47 NG/ML
PROT SERPL-MCNC: 6.9 G/DL (ref 6.4–8.2)
PROT UR STRIP-MCNC: NEGATIVE MG/DL
RBC # BLD AUTO: 5.32 MILLION/UL (ref 3.88–5.62)
SARS-COV-2 RNA RESP QL NAA+PROBE: NEGATIVE
SODIUM SERPL-SCNC: 134 MMOL/L (ref 136–145)
SODIUM SERPL-SCNC: 136 MMOL/L (ref 136–145)
SP GR UR STRIP.AUTO: 1.02 (ref 1–1.03)
TROPONIN I SERPL-MCNC: 0.02 NG/ML
UROBILINOGEN UR QL STRIP.AUTO: 0.2 E.U./DL
WBC # BLD AUTO: 6.1 THOUSAND/UL (ref 4.31–10.16)

## 2020-05-16 PROCEDURE — 83690 ASSAY OF LIPASE: CPT | Performed by: EMERGENCY MEDICINE

## 2020-05-16 PROCEDURE — 96375 TX/PRO/DX INJ NEW DRUG ADDON: CPT

## 2020-05-16 PROCEDURE — 96368 THER/DIAG CONCURRENT INF: CPT

## 2020-05-16 PROCEDURE — 87077 CULTURE AEROBIC IDENTIFY: CPT | Performed by: EMERGENCY MEDICINE

## 2020-05-16 PROCEDURE — 96365 THER/PROPH/DIAG IV INF INIT: CPT

## 2020-05-16 PROCEDURE — 84100 ASSAY OF PHOSPHORUS: CPT | Performed by: EMERGENCY MEDICINE

## 2020-05-16 PROCEDURE — 87086 URINE CULTURE/COLONY COUNT: CPT | Performed by: EMERGENCY MEDICINE

## 2020-05-16 PROCEDURE — 81003 URINALYSIS AUTO W/O SCOPE: CPT | Performed by: EMERGENCY MEDICINE

## 2020-05-16 PROCEDURE — 74176 CT ABD & PELVIS W/O CONTRAST: CPT

## 2020-05-16 PROCEDURE — 99285 EMERGENCY DEPT VISIT HI MDM: CPT | Performed by: EMERGENCY MEDICINE

## 2020-05-16 PROCEDURE — 85025 COMPLETE CBC W/AUTO DIFF WBC: CPT | Performed by: EMERGENCY MEDICINE

## 2020-05-16 PROCEDURE — 99222 1ST HOSP IP/OBS MODERATE 55: CPT | Performed by: PHYSICIAN ASSISTANT

## 2020-05-16 PROCEDURE — 80053 COMPREHEN METABOLIC PANEL: CPT | Performed by: EMERGENCY MEDICINE

## 2020-05-16 PROCEDURE — 84484 ASSAY OF TROPONIN QUANT: CPT | Performed by: EMERGENCY MEDICINE

## 2020-05-16 PROCEDURE — 87081 CULTURE SCREEN ONLY: CPT | Performed by: INTERNAL MEDICINE

## 2020-05-16 PROCEDURE — 36415 COLL VENOUS BLD VENIPUNCTURE: CPT | Performed by: EMERGENCY MEDICINE

## 2020-05-16 PROCEDURE — 80048 BASIC METABOLIC PNL TOTAL CA: CPT | Performed by: STUDENT IN AN ORGANIZED HEALTH CARE EDUCATION/TRAINING PROGRAM

## 2020-05-16 PROCEDURE — 87040 BLOOD CULTURE FOR BACTERIA: CPT | Performed by: EMERGENCY MEDICINE

## 2020-05-16 PROCEDURE — 87186 SC STD MICRODIL/AGAR DIL: CPT | Performed by: EMERGENCY MEDICINE

## 2020-05-16 PROCEDURE — 83735 ASSAY OF MAGNESIUM: CPT | Performed by: EMERGENCY MEDICINE

## 2020-05-16 PROCEDURE — 87635 SARS-COV-2 COVID-19 AMP PRB: CPT | Performed by: EMERGENCY MEDICINE

## 2020-05-16 PROCEDURE — 84145 PROCALCITONIN (PCT): CPT | Performed by: EMERGENCY MEDICINE

## 2020-05-16 PROCEDURE — 82805 BLOOD GASES W/O2 SATURATION: CPT | Performed by: EMERGENCY MEDICINE

## 2020-05-16 PROCEDURE — 99285 EMERGENCY DEPT VISIT HI MDM: CPT

## 2020-05-16 PROCEDURE — 83605 ASSAY OF LACTIC ACID: CPT | Performed by: EMERGENCY MEDICINE

## 2020-05-16 PROCEDURE — 71045 X-RAY EXAM CHEST 1 VIEW: CPT

## 2020-05-16 PROCEDURE — 93005 ELECTROCARDIOGRAM TRACING: CPT

## 2020-05-16 RX ORDER — ACETAMINOPHEN 325 MG/1
650 TABLET ORAL EVERY 6 HOURS PRN
Status: DISCONTINUED | OUTPATIENT
Start: 2020-05-16 | End: 2020-05-18 | Stop reason: HOSPADM

## 2020-05-16 RX ORDER — ONDANSETRON 2 MG/ML
INJECTION INTRAMUSCULAR; INTRAVENOUS
Status: COMPLETED
Start: 2020-05-16 | End: 2020-05-16

## 2020-05-16 RX ORDER — ASPIRIN 81 MG/1
81 TABLET, CHEWABLE ORAL DAILY
Status: DISCONTINUED | OUTPATIENT
Start: 2020-05-16 | End: 2020-05-18 | Stop reason: HOSPADM

## 2020-05-16 RX ORDER — ACETAMINOPHEN 325 MG/1
650 TABLET ORAL EVERY 6 HOURS PRN
COMMUNITY

## 2020-05-16 RX ORDER — SODIUM CHLORIDE 9 MG/ML
50 INJECTION, SOLUTION INTRAVENOUS CONTINUOUS
Status: DISCONTINUED | OUTPATIENT
Start: 2020-05-16 | End: 2020-05-16

## 2020-05-16 RX ORDER — ONDANSETRON 2 MG/ML
4 INJECTION INTRAMUSCULAR; INTRAVENOUS ONCE
Status: COMPLETED | OUTPATIENT
Start: 2020-05-16 | End: 2020-05-16

## 2020-05-16 RX ORDER — ONDANSETRON 2 MG/ML
4 INJECTION INTRAMUSCULAR; INTRAVENOUS EVERY 6 HOURS PRN
Status: DISCONTINUED | OUTPATIENT
Start: 2020-05-16 | End: 2020-05-18 | Stop reason: HOSPADM

## 2020-05-16 RX ORDER — MAGNESIUM SULFATE HEPTAHYDRATE 40 MG/ML
2 INJECTION, SOLUTION INTRAVENOUS
Status: COMPLETED | OUTPATIENT
Start: 2020-05-16 | End: 2020-05-16

## 2020-05-16 RX ORDER — ACETAMINOPHEN 325 MG/1
650 TABLET ORAL EVERY 6 HOURS PRN
Status: DISCONTINUED | OUTPATIENT
Start: 2020-05-16 | End: 2020-05-16 | Stop reason: SDUPTHER

## 2020-05-16 RX ORDER — CEFTRIAXONE 1 G/50ML
1000 INJECTION, SOLUTION INTRAVENOUS ONCE
Status: COMPLETED | OUTPATIENT
Start: 2020-05-16 | End: 2020-05-16

## 2020-05-16 RX ORDER — PANTOPRAZOLE SODIUM 40 MG/1
40 TABLET, DELAYED RELEASE ORAL
Status: DISCONTINUED | OUTPATIENT
Start: 2020-05-16 | End: 2020-05-18 | Stop reason: HOSPADM

## 2020-05-16 RX ORDER — HEPARIN SODIUM 5000 [USP'U]/ML
5000 INJECTION, SOLUTION INTRAVENOUS; SUBCUTANEOUS EVERY 8 HOURS SCHEDULED
Status: DISCONTINUED | OUTPATIENT
Start: 2020-05-16 | End: 2020-05-18 | Stop reason: HOSPADM

## 2020-05-16 RX ORDER — CARVEDILOL 25 MG/1
25 TABLET ORAL 2 TIMES DAILY WITH MEALS
Status: DISCONTINUED | OUTPATIENT
Start: 2020-05-16 | End: 2020-05-18 | Stop reason: HOSPADM

## 2020-05-16 RX ORDER — ATORVASTATIN CALCIUM 40 MG/1
40 TABLET, FILM COATED ORAL
Status: DISCONTINUED | OUTPATIENT
Start: 2020-05-16 | End: 2020-05-18 | Stop reason: HOSPADM

## 2020-05-16 RX ORDER — SODIUM CHLORIDE 9 MG/ML
50 INJECTION, SOLUTION INTRAVENOUS CONTINUOUS
Status: DISPENSED | OUTPATIENT
Start: 2020-05-16 | End: 2020-05-16

## 2020-05-16 RX ORDER — CEFTRIAXONE 1 G/50ML
1000 INJECTION, SOLUTION INTRAVENOUS EVERY 24 HOURS
Status: DISCONTINUED | OUTPATIENT
Start: 2020-05-17 | End: 2020-05-18

## 2020-05-16 RX ADMIN — POTASSIUM PHOSPHATE, MONOBASIC AND POTASSIUM PHOSPHATE, DIBASIC 30 MMOL: 224; 236 INJECTION, SOLUTION, CONCENTRATE INTRAVENOUS at 10:03

## 2020-05-16 RX ADMIN — HEPARIN SODIUM 5000 UNITS: 5000 INJECTION INTRAVENOUS; SUBCUTANEOUS at 21:34

## 2020-05-16 RX ADMIN — SODIUM CHLORIDE 50 ML/HR: 0.9 INJECTION, SOLUTION INTRAVENOUS at 07:04

## 2020-05-16 RX ADMIN — SODIUM CHLORIDE 50 ML/HR: 0.9 INJECTION, SOLUTION INTRAVENOUS at 12:17

## 2020-05-16 RX ADMIN — MAGNESIUM SULFATE HEPTAHYDRATE 2 G: 40 INJECTION, SOLUTION INTRAVENOUS at 05:16

## 2020-05-16 RX ADMIN — PANTOPRAZOLE SODIUM 40 MG: 40 TABLET, DELAYED RELEASE ORAL at 07:03

## 2020-05-16 RX ADMIN — ONDANSETRON 4 MG: 2 INJECTION INTRAMUSCULAR; INTRAVENOUS at 02:09

## 2020-05-16 RX ADMIN — CARVEDILOL 25 MG: 25 TABLET, FILM COATED ORAL at 16:07

## 2020-05-16 RX ADMIN — HEPARIN SODIUM 5000 UNITS: 5000 INJECTION INTRAVENOUS; SUBCUTANEOUS at 05:48

## 2020-05-16 RX ADMIN — MAGNESIUM SULFATE HEPTAHYDRATE 2 G: 40 INJECTION, SOLUTION INTRAVENOUS at 03:14

## 2020-05-16 RX ADMIN — CEFTRIAXONE 1000 MG: 1 INJECTION, SOLUTION INTRAVENOUS at 03:08

## 2020-05-16 RX ADMIN — ASPIRIN 81 MG 81 MG: 81 TABLET ORAL at 08:11

## 2020-05-16 RX ADMIN — SODIUM CHLORIDE 250 ML: 0.9 INJECTION, SOLUTION INTRAVENOUS at 03:00

## 2020-05-16 RX ADMIN — HEPARIN SODIUM 5000 UNITS: 5000 INJECTION INTRAVENOUS; SUBCUTANEOUS at 14:58

## 2020-05-16 RX ADMIN — ATORVASTATIN CALCIUM 40 MG: 40 TABLET, FILM COATED ORAL at 16:07

## 2020-05-17 PROBLEM — E83.39 HYPOPHOSPHATEMIA: Status: RESOLVED | Noted: 2020-05-16 | Resolved: 2020-05-17

## 2020-05-17 PROBLEM — E83.42 HYPOMAGNESEMIA: Status: RESOLVED | Noted: 2020-05-16 | Resolved: 2020-05-17

## 2020-05-17 PROBLEM — K52.9 ENTERITIS: Status: ACTIVE | Noted: 2020-05-16

## 2020-05-17 LAB
ANION GAP SERPL CALCULATED.3IONS-SCNC: 12 MMOL/L (ref 4–13)
BASOPHILS # BLD AUTO: 0.08 THOUSANDS/ΜL (ref 0–0.1)
BASOPHILS NFR BLD AUTO: 1 % (ref 0–1)
BUN SERPL-MCNC: 32 MG/DL (ref 5–25)
CALCIUM SERPL-MCNC: 8.8 MG/DL (ref 8.3–10.1)
CHLORIDE SERPL-SCNC: 106 MMOL/L (ref 100–108)
CO2 SERPL-SCNC: 20 MMOL/L (ref 21–32)
CREAT SERPL-MCNC: 2.3 MG/DL (ref 0.6–1.3)
EOSINOPHIL # BLD AUTO: 0.53 THOUSAND/ΜL (ref 0–0.61)
EOSINOPHIL NFR BLD AUTO: 4 % (ref 0–6)
ERYTHROCYTE [DISTWIDTH] IN BLOOD BY AUTOMATED COUNT: 13.4 % (ref 11.6–15.1)
GFR SERPL CREATININE-BSD FRML MDRD: 32 ML/MIN/1.73SQ M
GLUCOSE SERPL-MCNC: 108 MG/DL (ref 65–140)
HCT VFR BLD AUTO: 47.9 % (ref 36.5–49.3)
HGB BLD-MCNC: 15.2 G/DL (ref 12–17)
IMM GRANULOCYTES # BLD AUTO: 0.09 THOUSAND/UL (ref 0–0.2)
IMM GRANULOCYTES NFR BLD AUTO: 1 % (ref 0–2)
LYMPHOCYTES # BLD AUTO: 1.47 THOUSANDS/ΜL (ref 0.6–4.47)
LYMPHOCYTES NFR BLD AUTO: 12 % (ref 14–44)
MAGNESIUM SERPL-MCNC: 2.7 MG/DL (ref 1.6–2.6)
MCH RBC QN AUTO: 29.2 PG (ref 26.8–34.3)
MCHC RBC AUTO-ENTMCNC: 31.7 G/DL (ref 31.4–37.4)
MCV RBC AUTO: 92 FL (ref 82–98)
MONOCYTES # BLD AUTO: 1.91 THOUSAND/ΜL (ref 0.17–1.22)
MONOCYTES NFR BLD AUTO: 16 % (ref 4–12)
MRSA NOSE QL CULT: NORMAL
NEUTROPHILS # BLD AUTO: 7.98 THOUSANDS/ΜL (ref 1.85–7.62)
NEUTS SEG NFR BLD AUTO: 66 % (ref 43–75)
NRBC BLD AUTO-RTO: 0 /100 WBCS
PHOSPHATE SERPL-MCNC: 3.5 MG/DL (ref 2.7–4.5)
PLATELET # BLD AUTO: 243 THOUSANDS/UL (ref 149–390)
PMV BLD AUTO: 10.9 FL (ref 8.9–12.7)
POTASSIUM SERPL-SCNC: 4.2 MMOL/L (ref 3.5–5.3)
RBC # BLD AUTO: 5.2 MILLION/UL (ref 3.88–5.62)
SODIUM SERPL-SCNC: 138 MMOL/L (ref 136–145)
WBC # BLD AUTO: 12.06 THOUSAND/UL (ref 4.31–10.16)

## 2020-05-17 PROCEDURE — 99254 IP/OBS CNSLTJ NEW/EST MOD 60: CPT | Performed by: INTERNAL MEDICINE

## 2020-05-17 PROCEDURE — 80048 BASIC METABOLIC PNL TOTAL CA: CPT | Performed by: PHYSICIAN ASSISTANT

## 2020-05-17 PROCEDURE — 99232 SBSQ HOSP IP/OBS MODERATE 35: CPT | Performed by: STUDENT IN AN ORGANIZED HEALTH CARE EDUCATION/TRAINING PROGRAM

## 2020-05-17 PROCEDURE — 85025 COMPLETE CBC W/AUTO DIFF WBC: CPT | Performed by: PHYSICIAN ASSISTANT

## 2020-05-17 PROCEDURE — 84100 ASSAY OF PHOSPHORUS: CPT | Performed by: PHYSICIAN ASSISTANT

## 2020-05-17 PROCEDURE — 83735 ASSAY OF MAGNESIUM: CPT | Performed by: PHYSICIAN ASSISTANT

## 2020-05-17 RX ORDER — LORAZEPAM 0.5 MG/1
0.5 TABLET ORAL EVERY 8 HOURS PRN
Status: DISCONTINUED | OUTPATIENT
Start: 2020-05-17 | End: 2020-05-18 | Stop reason: HOSPADM

## 2020-05-17 RX ADMIN — HEPARIN SODIUM 5000 UNITS: 5000 INJECTION INTRAVENOUS; SUBCUTANEOUS at 22:49

## 2020-05-17 RX ADMIN — LORAZEPAM 0.5 MG: 0.5 TABLET ORAL at 13:17

## 2020-05-17 RX ADMIN — ASPIRIN 81 MG 81 MG: 81 TABLET ORAL at 09:13

## 2020-05-17 RX ADMIN — HEPARIN SODIUM 5000 UNITS: 5000 INJECTION INTRAVENOUS; SUBCUTANEOUS at 06:20

## 2020-05-17 RX ADMIN — PANTOPRAZOLE SODIUM 40 MG: 40 TABLET, DELAYED RELEASE ORAL at 06:21

## 2020-05-17 RX ADMIN — ATORVASTATIN CALCIUM 40 MG: 40 TABLET, FILM COATED ORAL at 17:03

## 2020-05-17 RX ADMIN — LORAZEPAM 0.5 MG: 0.5 TABLET ORAL at 23:43

## 2020-05-17 RX ADMIN — HEPARIN SODIUM 5000 UNITS: 5000 INJECTION INTRAVENOUS; SUBCUTANEOUS at 13:17

## 2020-05-17 RX ADMIN — CARVEDILOL 25 MG: 25 TABLET, FILM COATED ORAL at 09:12

## 2020-05-17 RX ADMIN — CARVEDILOL 25 MG: 25 TABLET, FILM COATED ORAL at 17:03

## 2020-05-17 RX ADMIN — CEFTRIAXONE 1000 MG: 1 INJECTION, SOLUTION INTRAVENOUS at 06:20

## 2020-05-17 RX ADMIN — SODIUM CHLORIDE 500 ML: 0.9 INJECTION, SOLUTION INTRAVENOUS at 18:19

## 2020-05-18 VITALS
BODY MASS INDEX: 33.2 KG/M2 | TEMPERATURE: 97.9 F | SYSTOLIC BLOOD PRESSURE: 144 MMHG | HEART RATE: 69 BPM | DIASTOLIC BLOOD PRESSURE: 87 MMHG | HEIGHT: 70 IN | OXYGEN SATURATION: 97 % | WEIGHT: 231.92 LBS | RESPIRATION RATE: 18 BRPM

## 2020-05-18 LAB
ANION GAP SERPL CALCULATED.3IONS-SCNC: 11 MMOL/L (ref 4–13)
BACTERIA UR CULT: ABNORMAL
BASOPHILS # BLD MANUAL: 0.07 THOUSAND/UL (ref 0–0.1)
BASOPHILS NFR MAR MANUAL: 1 % (ref 0–1)
BUN SERPL-MCNC: 25 MG/DL (ref 5–25)
CALCIUM SERPL-MCNC: 9.3 MG/DL (ref 8.3–10.1)
CHLORIDE SERPL-SCNC: 107 MMOL/L (ref 100–108)
CO2 SERPL-SCNC: 24 MMOL/L (ref 21–32)
CREAT SERPL-MCNC: 1.6 MG/DL (ref 0.6–1.3)
EOSINOPHIL # BLD MANUAL: 0.51 THOUSAND/UL (ref 0–0.4)
EOSINOPHIL NFR BLD MANUAL: 7 % (ref 0–6)
ERYTHROCYTE [DISTWIDTH] IN BLOOD BY AUTOMATED COUNT: 13 % (ref 11.6–15.1)
GFR SERPL CREATININE-BSD FRML MDRD: 49 ML/MIN/1.73SQ M
GLUCOSE SERPL-MCNC: 103 MG/DL (ref 65–140)
HCT VFR BLD AUTO: 51.1 % (ref 36.5–49.3)
HGB BLD-MCNC: 16 G/DL (ref 12–17)
LYMPHOCYTES # BLD AUTO: 1.76 THOUSAND/UL (ref 0.6–4.47)
LYMPHOCYTES # BLD AUTO: 24 % (ref 14–44)
MCH RBC QN AUTO: 29.1 PG (ref 26.8–34.3)
MCHC RBC AUTO-ENTMCNC: 31.3 G/DL (ref 31.4–37.4)
MCV RBC AUTO: 93 FL (ref 82–98)
MONOCYTES # BLD AUTO: 1.39 THOUSAND/UL (ref 0–1.22)
MONOCYTES NFR BLD: 19 % (ref 4–12)
NEUTROPHILS # BLD MANUAL: 3.6 THOUSAND/UL (ref 1.85–7.62)
NEUTS BAND NFR BLD MANUAL: 2 % (ref 0–8)
NEUTS SEG NFR BLD AUTO: 47 % (ref 43–75)
NRBC BLD AUTO-RTO: 0 /100 WBCS
PLATELET # BLD AUTO: 288 THOUSANDS/UL (ref 149–390)
PLATELET BLD QL SMEAR: ADEQUATE
PMV BLD AUTO: 10.7 FL (ref 8.9–12.7)
POTASSIUM SERPL-SCNC: 5 MMOL/L (ref 3.5–5.3)
PROCALCITONIN SERPL-MCNC: 12.12 NG/ML
RBC # BLD AUTO: 5.5 MILLION/UL (ref 3.88–5.62)
RBC MORPH BLD: NORMAL
SODIUM SERPL-SCNC: 142 MMOL/L (ref 136–145)
TOTAL CELLS COUNTED SPEC: 100
WBC # BLD AUTO: 7.34 THOUSAND/UL (ref 4.31–10.16)

## 2020-05-18 PROCEDURE — 85027 COMPLETE CBC AUTOMATED: CPT | Performed by: STUDENT IN AN ORGANIZED HEALTH CARE EDUCATION/TRAINING PROGRAM

## 2020-05-18 PROCEDURE — 85007 BL SMEAR W/DIFF WBC COUNT: CPT | Performed by: STUDENT IN AN ORGANIZED HEALTH CARE EDUCATION/TRAINING PROGRAM

## 2020-05-18 PROCEDURE — 99232 SBSQ HOSP IP/OBS MODERATE 35: CPT | Performed by: INTERNAL MEDICINE

## 2020-05-18 PROCEDURE — 80048 BASIC METABOLIC PNL TOTAL CA: CPT | Performed by: STUDENT IN AN ORGANIZED HEALTH CARE EDUCATION/TRAINING PROGRAM

## 2020-05-18 PROCEDURE — 99239 HOSP IP/OBS DSCHRG MGMT >30: CPT | Performed by: STUDENT IN AN ORGANIZED HEALTH CARE EDUCATION/TRAINING PROGRAM

## 2020-05-18 PROCEDURE — 84145 PROCALCITONIN (PCT): CPT | Performed by: INTERNAL MEDICINE

## 2020-05-18 RX ORDER — CEFDINIR 300 MG/1
300 CAPSULE ORAL EVERY 12 HOURS SCHEDULED
Qty: 10 CAPSULE | Refills: 0 | Status: SHIPPED | OUTPATIENT
Start: 2020-05-18 | End: 2020-05-23

## 2020-05-18 RX ORDER — LISINOPRIL 5 MG/1
5 TABLET ORAL DAILY
Status: DISCONTINUED | OUTPATIENT
Start: 2020-05-18 | End: 2020-05-18 | Stop reason: HOSPADM

## 2020-05-18 RX ORDER — FUROSEMIDE 20 MG/1
20 TABLET ORAL DAILY
Qty: 30 TABLET | Refills: 0 | Status: SHIPPED | OUTPATIENT
Start: 2020-05-19 | End: 2020-07-08 | Stop reason: SDUPTHER

## 2020-05-18 RX ORDER — CEFDINIR 300 MG/1
300 CAPSULE ORAL EVERY 12 HOURS SCHEDULED
Status: DISCONTINUED | OUTPATIENT
Start: 2020-05-18 | End: 2020-05-18 | Stop reason: HOSPADM

## 2020-05-18 RX ORDER — SPIRONOLACTONE 25 MG/1
12.5 TABLET ORAL DAILY
Qty: 30 TABLET | Refills: 0 | Status: SHIPPED | OUTPATIENT
Start: 2020-05-19 | End: 2020-08-20 | Stop reason: SDUPTHER

## 2020-05-18 RX ADMIN — CEFTRIAXONE 1000 MG: 1 INJECTION, SOLUTION INTRAVENOUS at 06:17

## 2020-05-18 RX ADMIN — LISINOPRIL 5 MG: 5 TABLET ORAL at 11:58

## 2020-05-18 RX ADMIN — PANTOPRAZOLE SODIUM 40 MG: 40 TABLET, DELAYED RELEASE ORAL at 06:17

## 2020-05-18 RX ADMIN — CARVEDILOL 25 MG: 25 TABLET, FILM COATED ORAL at 07:46

## 2020-05-18 RX ADMIN — ASPIRIN 81 MG 81 MG: 81 TABLET ORAL at 08:38

## 2020-05-18 RX ADMIN — HEPARIN SODIUM 5000 UNITS: 5000 INJECTION INTRAVENOUS; SUBCUTANEOUS at 06:17

## 2020-05-19 ENCOUNTER — TRANSITIONAL CARE MANAGEMENT (OUTPATIENT)
Dept: FAMILY MEDICINE CLINIC | Facility: CLINIC | Age: 50
End: 2020-05-19

## 2020-05-20 ENCOUNTER — TELEMEDICINE (OUTPATIENT)
Dept: PULMONOLOGY | Facility: MEDICAL CENTER | Age: 50
End: 2020-05-20
Payer: COMMERCIAL

## 2020-05-20 VITALS
BODY MASS INDEX: 32.93 KG/M2 | RESPIRATION RATE: 12 BRPM | OXYGEN SATURATION: 98 % | DIASTOLIC BLOOD PRESSURE: 84 MMHG | HEIGHT: 70 IN | SYSTOLIC BLOOD PRESSURE: 148 MMHG | WEIGHT: 230 LBS | TEMPERATURE: 97.1 F

## 2020-05-20 DIAGNOSIS — G47.19 DAYTIME HYPERSOMNOLENCE: Primary | ICD-10-CM

## 2020-05-20 DIAGNOSIS — J43.2 CENTRILOBULAR EMPHYSEMA (HCC): ICD-10-CM

## 2020-05-20 DIAGNOSIS — Z87.891 FORMER SMOKER: ICD-10-CM

## 2020-05-20 DIAGNOSIS — J96.11 CHRONIC RESPIRATORY FAILURE WITH HYPOXIA (HCC): ICD-10-CM

## 2020-05-20 PROCEDURE — 99214 OFFICE O/P EST MOD 30 MIN: CPT | Performed by: NURSE PRACTITIONER

## 2020-05-21 LAB
BACTERIA BLD CULT: NORMAL
BACTERIA BLD CULT: NORMAL

## 2020-05-22 ENCOUNTER — TELEMEDICINE (OUTPATIENT)
Dept: FAMILY MEDICINE CLINIC | Facility: CLINIC | Age: 50
End: 2020-05-22
Payer: COMMERCIAL

## 2020-05-22 VITALS
TEMPERATURE: 98.4 F | BODY MASS INDEX: 32.21 KG/M2 | SYSTOLIC BLOOD PRESSURE: 135 MMHG | WEIGHT: 225 LBS | HEIGHT: 70 IN | DIASTOLIC BLOOD PRESSURE: 84 MMHG | HEART RATE: 74 BPM

## 2020-05-22 DIAGNOSIS — J96.11 CHRONIC RESPIRATORY FAILURE WITH HYPOXIA (HCC): ICD-10-CM

## 2020-05-22 DIAGNOSIS — I50.42 CHRONIC COMBINED SYSTOLIC AND DIASTOLIC CONGESTIVE HEART FAILURE (HCC): ICD-10-CM

## 2020-05-22 DIAGNOSIS — I25.10 CORONARY ARTERY DISEASE INVOLVING NATIVE CORONARY ARTERY OF NATIVE HEART WITHOUT ANGINA PECTORIS: ICD-10-CM

## 2020-05-22 DIAGNOSIS — J43.2 CENTRILOBULAR EMPHYSEMA (HCC): ICD-10-CM

## 2020-05-22 DIAGNOSIS — K52.9 ENTEROCOLITIS: ICD-10-CM

## 2020-05-22 DIAGNOSIS — N17.9 AKI (ACUTE KIDNEY INJURY) (HCC): ICD-10-CM

## 2020-05-22 DIAGNOSIS — I42.9 CARDIOMYOPATHY, UNSPECIFIED TYPE (HCC): ICD-10-CM

## 2020-05-22 DIAGNOSIS — G47.33 OSA (OBSTRUCTIVE SLEEP APNEA): ICD-10-CM

## 2020-05-22 DIAGNOSIS — I10 BENIGN ESSENTIAL HYPERTENSION: ICD-10-CM

## 2020-05-22 PROCEDURE — 99215 OFFICE O/P EST HI 40 MIN: CPT | Performed by: FAMILY MEDICINE

## 2020-05-24 LAB
ATRIAL RATE: 87 BPM
P AXIS: 42 DEGREES
PR INTERVAL: 194 MS
QRS AXIS: -37 DEGREES
QRSD INTERVAL: 162 MS
QT INTERVAL: 432 MS
QTC INTERVAL: 519 MS
T WAVE AXIS: 110 DEGREES
VENTRICULAR RATE: 87 BPM

## 2020-05-24 PROCEDURE — 93010 ELECTROCARDIOGRAM REPORT: CPT | Performed by: INTERNAL MEDICINE

## 2020-05-26 ENCOUNTER — TELEPHONE (OUTPATIENT)
Dept: SLEEP CENTER | Facility: CLINIC | Age: 50
End: 2020-05-26

## 2020-05-28 ENCOUNTER — APPOINTMENT (OUTPATIENT)
Dept: LAB | Facility: CLINIC | Age: 50
End: 2020-05-28
Payer: COMMERCIAL

## 2020-05-28 DIAGNOSIS — N17.9 AKI (ACUTE KIDNEY INJURY) (HCC): ICD-10-CM

## 2020-05-28 LAB
ANION GAP SERPL CALCULATED.3IONS-SCNC: 3 MMOL/L (ref 4–13)
BUN SERPL-MCNC: 22 MG/DL (ref 5–25)
CALCIUM SERPL-MCNC: 8.6 MG/DL (ref 8.3–10.1)
CHLORIDE SERPL-SCNC: 113 MMOL/L (ref 100–108)
CO2 SERPL-SCNC: 25 MMOL/L (ref 21–32)
CREAT SERPL-MCNC: 1.23 MG/DL (ref 0.6–1.3)
GFR SERPL CREATININE-BSD FRML MDRD: 68 ML/MIN/1.73SQ M
GLUCOSE P FAST SERPL-MCNC: 105 MG/DL (ref 65–99)
POTASSIUM SERPL-SCNC: 4.6 MMOL/L (ref 3.5–5.3)
SODIUM SERPL-SCNC: 141 MMOL/L (ref 136–145)

## 2020-05-28 PROCEDURE — 36415 COLL VENOUS BLD VENIPUNCTURE: CPT

## 2020-05-28 PROCEDURE — 80048 BASIC METABOLIC PNL TOTAL CA: CPT

## 2020-06-05 DIAGNOSIS — I42.9 CARDIOMYOPATHY (HCC): ICD-10-CM

## 2020-06-05 DIAGNOSIS — I50.43 ACUTE ON CHRONIC COMBINED SYSTOLIC AND DIASTOLIC CHF (CONGESTIVE HEART FAILURE) (HCC): ICD-10-CM

## 2020-06-05 DIAGNOSIS — I10 UNCONTROLLED HYPERTENSION: ICD-10-CM

## 2020-06-07 PROBLEM — I42.0 DILATED CARDIOMYOPATHY (HCC): Status: ACTIVE | Noted: 2020-06-07

## 2020-06-08 RX ORDER — LISINOPRIL 5 MG/1
5 TABLET ORAL DAILY
Qty: 30 TABLET | Refills: 11 | Status: SHIPPED | OUTPATIENT
Start: 2020-06-08 | End: 2020-06-10 | Stop reason: SDUPTHER

## 2020-06-08 RX ORDER — ATORVASTATIN CALCIUM 40 MG/1
40 TABLET, FILM COATED ORAL
Qty: 30 TABLET | Refills: 11 | Status: SHIPPED | OUTPATIENT
Start: 2020-06-08 | End: 2021-06-10 | Stop reason: SDUPTHER

## 2020-06-08 RX ORDER — CARVEDILOL 25 MG/1
25 TABLET ORAL 2 TIMES DAILY WITH MEALS
Qty: 60 TABLET | Refills: 11 | Status: SHIPPED | OUTPATIENT
Start: 2020-06-08 | End: 2021-02-17 | Stop reason: SDUPTHER

## 2020-06-10 ENCOUNTER — OFFICE VISIT (OUTPATIENT)
Dept: CARDIOLOGY CLINIC | Facility: CLINIC | Age: 50
End: 2020-06-10
Payer: COMMERCIAL

## 2020-06-10 ENCOUNTER — TELEPHONE (OUTPATIENT)
Dept: CARDIOLOGY CLINIC | Facility: CLINIC | Age: 50
End: 2020-06-10

## 2020-06-10 VITALS
SYSTOLIC BLOOD PRESSURE: 140 MMHG | WEIGHT: 230 LBS | DIASTOLIC BLOOD PRESSURE: 70 MMHG | TEMPERATURE: 97.4 F | BODY MASS INDEX: 32.93 KG/M2 | OXYGEN SATURATION: 95 % | HEART RATE: 75 BPM | HEIGHT: 70 IN

## 2020-06-10 DIAGNOSIS — J96.11 CHRONIC RESPIRATORY FAILURE WITH HYPOXIA (HCC): ICD-10-CM

## 2020-06-10 DIAGNOSIS — I27.21 SECONDARY PULMONARY ARTERIAL HYPERTENSION (HCC): ICD-10-CM

## 2020-06-10 DIAGNOSIS — J43.2 CENTRILOBULAR EMPHYSEMA (HCC): ICD-10-CM

## 2020-06-10 DIAGNOSIS — I42.0 DILATED CARDIOMYOPATHY (HCC): ICD-10-CM

## 2020-06-10 DIAGNOSIS — I50.42 CHRONIC COMBINED SYSTOLIC AND DIASTOLIC CONGESTIVE HEART FAILURE, NYHA CLASS 2 (HCC): ICD-10-CM

## 2020-06-10 DIAGNOSIS — G47.33 OSA (OBSTRUCTIVE SLEEP APNEA): ICD-10-CM

## 2020-06-10 DIAGNOSIS — R06.00 DOE (DYSPNEA ON EXERTION): ICD-10-CM

## 2020-06-10 DIAGNOSIS — I25.10 CORONARY ARTERY DISEASE INVOLVING NATIVE CORONARY ARTERY OF NATIVE HEART WITHOUT ANGINA PECTORIS: ICD-10-CM

## 2020-06-10 DIAGNOSIS — Z72.89 ALCOHOL USE: ICD-10-CM

## 2020-06-10 DIAGNOSIS — I10 BENIGN ESSENTIAL HYPERTENSION: ICD-10-CM

## 2020-06-10 PROCEDURE — 99214 OFFICE O/P EST MOD 30 MIN: CPT | Performed by: INTERNAL MEDICINE

## 2020-06-10 PROCEDURE — 1111F DSCHRG MED/CURRENT MED MERGE: CPT | Performed by: INTERNAL MEDICINE

## 2020-06-10 RX ORDER — LISINOPRIL 20 MG/1
20 TABLET ORAL DAILY
Qty: 90 TABLET | Refills: 1 | Status: SHIPPED | OUTPATIENT
Start: 2020-06-10 | End: 2021-08-09 | Stop reason: SDUPTHER

## 2020-06-11 LAB
ATRIAL RATE: 106 BPM
P AXIS: 86 DEGREES
PR INTERVAL: 162 MS
QRS AXIS: 120 DEGREES
QRSD INTERVAL: 158 MS
QT INTERVAL: 398 MS
QTC INTERVAL: 528 MS
T WAVE AXIS: 14 DEGREES
VENTRICULAR RATE: 106 BPM

## 2020-06-11 PROCEDURE — 93010 ELECTROCARDIOGRAM REPORT: CPT | Performed by: INTERNAL MEDICINE

## 2020-06-25 ENCOUNTER — HOSPITAL ENCOUNTER (OUTPATIENT)
Dept: SLEEP CENTER | Facility: CLINIC | Age: 50
Discharge: HOME | End: 2020-06-25
Payer: COMMERCIAL

## 2020-06-25 DIAGNOSIS — G47.19 DAYTIME HYPERSOMNOLENCE: ICD-10-CM

## 2020-06-25 PROCEDURE — G0399 HOME SLEEP TEST/TYPE 3 PORTA: HCPCS | Performed by: INTERNAL MEDICINE

## 2020-06-25 PROCEDURE — 95806 SLEEP STUDY UNATT&RESP EFFT: CPT

## 2020-06-25 PROCEDURE — G0399 HOME SLEEP TEST/TYPE 3 PORTA: HCPCS

## 2020-07-01 DIAGNOSIS — G47.33 OSA (OBSTRUCTIVE SLEEP APNEA): Primary | ICD-10-CM

## 2020-07-01 NOTE — PROGRESS NOTES
I reviewed home sleep study with patient  AHi was 45 events per hour  I am going to ordering auto CPAP 6-20 cm H2O  He in aware that once he receives the auto CPAP, he will be responsible for a compliance visit in approximately 45 days  Patient is aware that he will use this auto CPAP on room air  He is agreed to the same  Once he receives his auto CPAP I will order a nocturnal pulse oximetry    Patient has agreed to the same

## 2020-07-08 DIAGNOSIS — I50.43 ACUTE ON CHRONIC COMBINED SYSTOLIC AND DIASTOLIC CHF (CONGESTIVE HEART FAILURE) (HCC): ICD-10-CM

## 2020-07-08 RX ORDER — FUROSEMIDE 20 MG/1
20 TABLET ORAL DAILY
Qty: 30 TABLET | Refills: 5 | Status: SHIPPED | OUTPATIENT
Start: 2020-07-08 | End: 2021-02-10 | Stop reason: SDUPTHER

## 2020-08-15 NOTE — PROGRESS NOTES
Progress Note - Cardiology Office  [facility]    Srinivas Ruiz 45 y o  male MRN: [id number]  : 1970  Encounter: [id number]      Assessment:     1  Benign essential hypertension    2  Coronary artery disease involving native coronary artery of native heart without angina pectoris    3  Chronic combined systolic and diastolic congestive heart failure ([facility] )    4  Dilated cardiomyopathy ([facility] )    5  Secondary pulmonary arterial hypertension ([facility] )    6  ZAYAS (dyspnea on exertion)    7  PARKER (obstructive sleep apnea)    8  Centrilobular emphysema ([facility] )    9  Encounter for screening colonoscopy        Discussion Summary and Plan:  1  Combined systolic and diastolic heart failure with dilated cardiomyopathy  Patient EF was found to be 25- 30%  Will optimize heart failure medications  Increase lisinopril to 20 mg daily  Continue Coreg, Aldactone, aspirin and statins  Patient has previous lost about 30 lb he has gained back her around 15  Advised him not to get it back further  No leg edema  He was not doing anything just eating as per him he was not happy lost history friends  He was taking his medications  Dietary changes advised  Will check echo Doppler for LV EF improvement if not significant he may need biventricular pacemaker and ICD  His BMP from August reviewed electrolyte acceptable      2  Nonischemic cardiomyopathy  Most likely etiology longstanding uncontrolled hypertension in the setting of LBBB and alcohol abuse  Cardiac catheterization now evidence of obstructive CAD  Echo Doppler has been ordered     3  CKD with history of kidney injury  Other electrolytes were acceptable  BMP acceptable continue increased dose of lisinopril continue same dose of diuretics and Aldactone  Electrolytes are stable from 2020     4  Dyspnea on exertion    Still present etiology is multifactorial   He used to be heavy smoker now quit has gained weight BMI is around 35 advised to lose weight  There is no evidence of any volume overload at this time      5  Chronic hypoxic respiratory failure  On oxygen therapy at nighttime     6  Dyslipidemia on statin  He is tolerating it very well     7  Previous history of alcohol abuse and tobacco abuse  He still drinks  Advised him to stop drinking otherwise high risk for development and worsening of cardiomyopathy  8  Obesity with BMI of 35  Dietary changes advised    Patient has defibrillator vest placed no ICD shocks were noted  Issues related to vest discussed with him  Medications renewed  Will see him in 6-8 weeks  Labs has been ordered  Please call [phone] if any questions  Counseling :  A description of the counseling  Goals and Barriers  Patient's ability to self care: Yes  Medication side effect reviewed with patient in detail and all their questions answered to their satisfaction  HPI :     Francesca Mccarthy is a 45y o  year old male who came for follow up  Patient was recently admitted to [facility] with shortness of breath  He was diagnosed to have pulmonary edema later non workup shows that he had cardiomyopathy with EF around 20-30% with paradoxical septal motion secondary to his LBBB  During workup of also found to have hypertension, LBBB, history of emphysema/COPD  He used to smoke heavily quit 6 years ago  He used to drink also now he drinks occasionally  He underwent cardiac catheterization found to have mild nonobstructive disease and was started on medical therapy  He has a defibrillator vest   No ICD shocks  Since hospitalization he has lost about 30 lb  He is breathing much better there is no leg edema no nausea no vomiting no other issues  He feels his doing well much better than before  08/20/2020   above reviewed  Patient came for follow-up    He had history of cardiomyopathy with EF around 30% with paradoxical septal motion secondary to his LBBB, hypertension, history of COPD emphysema, obesity with BMI around 35 now came for follow-up  He has stopped using his defibrillator vest   He says he was very depressed and sad as he lost his 3 friends  He gained back about 17 lb  He still breathing much better no leg edema  No other issues at this time  Review of Systems   Constitutional: Negative for activity change, chills, diaphoresis, fever and unexpected weight change  Has gained 15 lb bad but no leg edema was overeating and not exercising   HENT: Negative for congestion  Eyes: Negative for discharge and redness  Respiratory: Positive for shortness of breath  Negative for cough, chest tightness and wheezing  Exertional still better than when he was admitted   Cardiovascular: Negative  Negative for chest pain, palpitations and leg swelling  Gastrointestinal: Negative for abdominal pain, diarrhea and nausea  Endocrine: Negative  Genitourinary: Negative for decreased urine volume and urgency  Musculoskeletal: Negative  Negative for arthralgias, back pain and gait problem  Skin: Negative for rash and wound  Allergic/Immunologic: Negative  Neurological: Negative for dizziness, seizures, syncope, weakness, light-headedness and headaches  Hematological: Negative  Psychiatric/Behavioral: Negative for agitation and confusion  The patient is nervous/anxious          Historical Information   Past Medical History:   Diagnosis Date    Acute hypoxemic respiratory failure (HCC)     Cardiomyopathy [facility] )     [facility] 5/20/2020    CHF (congestive heart failure) (HCC)     Combined systolic and diastolic cardiac dysfunction     Coronary artery disease     Hypertension     Mixed sleep apnea     Secondary pulmonary arterial hypertension (HCC)     Volume overload      Past Surgical History:   Procedure Laterality Date    CHOLECYSTECTOMY      FRACTURE SURGERY      MANDIBLE SURGERY      TONSILLECTOMY       Social History     Substance and Sexual Activity   Alcohol Use Yes    Alcohol/week: 2 0 standard drinks    Types: 2 Cans of beer per week    Frequency: 2-4 times a month    Drinks per session: 1 or 2    Binge frequency: Monthly     Social History     Substance and Sexual Activity   Drug Use Not Currently     Social History     Tobacco Use   Smoking Status Former Smoker    Packs/day:     Years:     Pack years:     Types: Cigarettes    Last attempt to quit: 2014    Years since quittin 3   Smokeless Tobacco Never Used     Family History:   Family History   Problem Relation Age of Onset    Heart disease Father     Cancer Father     Cancer Mother        Meds/Allergies     Allergies   Allergen Reactions    Cheese     Chocolate      Reaction Date: 2007;     Nuts      Reaction Date: 2007;        Current Outpatient Medications:     acetaminophen (TYLENOL) 325 mg tablet, Take 650 mg by mouth every 6 (six) hours as needed for mild pain or fever, Disp: , Rfl:     aspirin 81 mg chewable tablet, Chew 1 tablet (81 mg total) daily, Disp: 30 tablet, Rfl: 0    atorvastatin (LIPITOR) 40 mg tablet, Take 1 tablet (40 mg total) by mouth daily with dinner, Disp: 30 tablet, Rfl: 11    carvedilol (COREG) 25 mg tablet, Take 1 tablet (25 mg total) by mouth 2 (two) times a day with meals, Disp: 60 tablet, Rfl: 11    furosemide (LASIX) 20 mg tablet, Take 1 tablet (20 mg total) by mouth daily, Disp: 30 tablet, Rfl: 5    lisinopril (ZESTRIL) 20 mg tablet, Take 1 tablet (20 mg total) by mouth daily, Disp: 90 tablet, Rfl: 1    Omeprazole-Sodium Bicarbonate (ZEGERID OTC PO), Take by mouth daily, Disp: , Rfl:     spironolactone (ALDACTONE) 25 mg tablet, Take 0 5 tablets (12 5 mg total) by mouth daily, Disp: 30 tablet, Rfl: 0    umeclidinium bromide (INCRUSE ELLIPTA) 62 5 mcg/inh AEPB inhaler, Inhale 1 puff daily, Disp: 1 Inhaler, Rfl: 5    albuterol (ProAir HFA) 90 mcg/act inhaler, Inhale 2 puffs every 4 (four) hours as needed for wheezing or shortness of breath (Patient not taking: Reported on 5/22/2020), Disp: 8 5 g, Rfl: 0    mupirocin (BACTROBAN) 2 % ointment, Apply to the nares twice a day for 2 days (Patient not taking: Reported on 5/16/2020), Disp: 15 g, Rfl: 0    ondansetron (ZOFRAN) 4 mg tablet, Take 1 tablet (4 mg total) by mouth every 8 (eight) hours as needed for nausea or vomiting (Patient not taking: Reported on 6/10/2020), Disp: 20 tablet, Rfl: 0    pantoprazole (PROTONIX) 40 mg tablet, Take 1 tablet (40 mg total) by mouth daily in the early morning (Patient not taking: Reported on 5/19/2020), Disp: 30 tablet, Rfl: 0    Vitals: Blood pressure 124/84, pulse 84, temperature 97 9 °F (36 6 °C), height 5' 10" (1 778 m), weight 112 kg (247 lb), SpO2 96 %  Body mass index is 35 44 kg/m²  Vitals:    08/20/20 1022   Weight: 112 kg (247 lb)     BP Readings from Last 3 Encounters:   08/20/20 124/84   06/10/20 140/70   05/22/20 135/84       Physical Exam  Constitutional:       General: He is not in acute distress  Appearance: He is well-developed  He is not diaphoretic  HENT:      Head: Normocephalic and atraumatic  Eyes:      Pupils: Pupils are equal, round, and reactive to light  Neck:      Musculoskeletal: Neck supple  Thyroid: No thyromegaly  Vascular: No JVD  Trachea: No tracheal deviation  Cardiovascular:      Rate and Rhythm: Normal rate and regular rhythm  Heart sounds: S1 normal and S2 normal  Heart sounds not distant  Murmur present  Systolic (ejection) murmur present with a grade of 2/6  No friction rub  No gallop  No S3 or S4 sounds  Pulmonary:      Effort: Pulmonary effort is normal  No respiratory distress  Breath sounds: Normal breath sounds  No wheezing or rales  Chest:      Chest wall: No tenderness  Abdominal:      General: Bowel sounds are normal  There is no distension  Palpations: Abdomen is soft  Tenderness: There is no abdominal tenderness  Musculoskeletal:         General: No deformity  Skin:     General: Skin is warm and dry  Coloration: Skin is not pale  Findings: No rash  Neurological:      Mental Status: He is alert and oriented to person, place, and time  Psychiatric:         Behavior: Behavior normal          Judgment: Judgment normal              Diagnostic Studies Review Cardio:  Cardiac catheterization and echo report reviewed  EKG:  Patient has history of LBBB  Cardiac testing:   Results for orders placed during the hospital encounter of 20   Echo complete with contrast if indicated    Narrative [address]  [address] [id number]  [phone]    Transthoracic Echocardiogram  2D, M-mode, Doppler, and Color Doppler    Study date:  02-May-2020    Patient: Sukh Love  MR number: [id number]  Account number: [de-identified]  : 1970  Age: 45 years  Gender: Male  Status: Inpatient  Location: Bedside  Height: 70 in  Weight: 270 4 lb  BP: 153/ 96 mmHg    Indications: SOB    Diagnoses: I50 9 - Heart failure, unspecified    Sonographer:  PRAMOD Henderson  Referring Physician:  BRISEIDA Ramon  Group:  [id number]  Interpreting Physician:  Yue Mccann MD    SUMMARY    LEFT VENTRICLE:  The ventricle was mildly dilated  Systolic function was severely reduced  Ejection fraction was estimated in the range of 20 % to 30 %  There was severe diffuse hypokinesis  Wall thickness was mildly increased  There was mild concentric hypertrophy  Doppler parameters were consistent with a reversible restrictive pattern, indicative of decreased left ventricular diastolic compliance and/or increased left atrial pressure (grade 3 diastolic dysfunction)  VENTRICULAR SEPTUM:  There was moderate paradoxical motion  These changes are consistent with LBBB  RIGHT VENTRICLE:  The ventricle was mildly dilated  Systolic function was mildly reduced      LEFT ATRIUM:  The atrium was moderately dilated  RIGHT ATRIUM:  The atrium was mildly dilated  MITRAL VALVE:  There was mild regurgitation  TRICUSPID VALVE:  There was mild to moderate regurgitation  Estimated peak PA pressure was 45 to 50 mmHg  IVC, HEPATIC VEINS:  The inferior vena cava was mildly dilated  The respirophasic change in diameter was more than 50%  HISTORY: PRIOR HISTORY: HTN,Acute kidney injury,pulmonary edema,esophageal reflux,Luetscher's syndrome,panic disorder,seborrhea  PROCEDURE: The procedure was performed at the bedside  This was a routine study  The transthoracic approach was used  The study included complete 2D imaging, M-mode, complete spectral Doppler, and color Doppler  The heart rate was 95 bpm,  at the start of the study  Images were obtained from the parasternal, apical, subcostal, and suprasternal notch acoustic windows  Image quality was adequate  LEFT VENTRICLE: The ventricle was mildly dilated  Systolic function was severely reduced  Ejection fraction was estimated in the range of 20 % to 30 %  There was severe diffuse hypokinesis  Wall thickness was mildly increased  There was  mild concentric hypertrophy  DOPPLER: Doppler parameters were consistent with a reversible restrictive pattern, indicative of decreased left ventricular diastolic compliance and/or increased left atrial pressure (grade 3 diastolic  dysfunction)  VENTRICULAR SEPTUM: There was moderate paradoxical motion  These changes are consistent with LBBB  RIGHT VENTRICLE: The ventricle was mildly dilated  Systolic function was mildly reduced  LEFT ATRIUM: The atrium was moderately dilated  RIGHT ATRIUM: The atrium was mildly dilated  MITRAL VALVE: There was normal leaflet separation  DOPPLER: Transmitral velocity was minimally increased  There was no evidence for stenosis  There was mild regurgitation  AORTIC VALVE: The valve was trileaflet   Leaflets exhibited mildly increased thickness and normal cuspal separation  DOPPLER: Transaortic velocity was within the normal range  There was no evidence for stenosis  There was no regurgitation  TRICUSPID VALVE: DOPPLER: There was mild to moderate regurgitation  Estimated peak PA pressure was 45 to 50 mmHg  PULMONIC VALVE: DOPPLER: There was no significant regurgitation  PERICARDIUM: There was no thickening or calcification  There was no pericardial effusion  AORTA: The root exhibited normal size  SYSTEMIC VEINS: IVC: The inferior vena cava was mildly dilated  The respirophasic change in diameter was more than 50%  SYSTEM MEASUREMENT TABLES    2D mode  AoR Diam 2D: 3 8 cm  LA Diam (2D): 4 2 cm  LA/Ao (2D): 1 11  FS (2D Teich): 13 6 %  IVSd (2D): 1 26 cm  LVDEV: 152 cmï¾³  LVEDV MOD BP: 261 cmï¾³  LVESV: 108 cmï¾³  LVIDd(2D): 5 57 cm  LVISd (2D): 4 81 cm  LVOT Area 2D: 3 14 cmï¾²  LVPWd (2D): 1 18 cm  SV (Teich): 44 cmï¾³    Apical four chamber  LVEF A4C: 30 %    Apical two chamber  LVEF A2C: 26 %    Unspecified Scan Mode  MATTHEW Cont Eq (Peak Pierre): 2 56 cmï¾²  LVOT (VTI): 12 4 cm  LVOT Diam : 2 cm  LVOT Vmax: 985 mm/s  LVOT Vmax; Mean: 1010 mm/s  Peak Grad ; Mean: 4 mm[Hg]  SV (LVOT): 39 cmï¾³  VTI;Mean: 2 mm[Hg]  MATTHEW Cont Eq (VTI): 1 44 cmï¾²  MV Peak E Pierre   Mean: 1290 mm/s  MVA (PHT): 5 79 cmï¾²  PHT: 29 ms  Max P mm[Hg]  V Max: 3060 mm/s  Vmax: 2660 mm/s  RA Area: 27 9 cmï¾²  RA Volume: 105 7 cmï¾³  TAPSE: 1 5 cm    [employer] Accredited [facility]    Prepared and electronically signed by    Alverto Hatch MD  Signed 02-May-2020 13:42:41         Lab Review   Lab Results   Component Value Date    WBC 7 34 2020    HGB 16 0 2020    HCT 51 1 (H) 2020    MCV 93 2020    RDW 13 0 2020     2020     BMP:  Lab Results   Component Value Date    SODIUM 142 2020    K 4 3 2020     (H) 2020    CO2 28 2020    BUN 14 2020 CREATININE 1 23 08/18/2020    GLUC 143 (H) 08/18/2020    GLUF 105 (H) 05/28/2020    CALCIUM 9 1 08/18/2020    EGFR 68 08/18/2020    MG 2 7 (H) 05/17/2020     LFT:  Lab Results   Component Value Date    AST 16 05/16/2020    ALT 25 05/16/2020    ALKPHOS 66 05/16/2020    TP 6 9 05/16/2020    ALB 3 5 05/16/2020      Lab Results   Component Value Date    VMN2KPANITYK 8 798 (H) 05/01/2020     No results found for: HGBA1C  Lipid Profile:   Lab Results   Component Value Date    CHOLESTEROL 115 05/04/2020    HDL 32 (L) 05/04/2020    LDLCALC 57 05/04/2020    TRIG 130 05/04/2020     Lab Results   Component Value Date    CHOLESTEROL 115 05/04/2020     Lab Results   Component Value Date    TROPONINI 0 02 05/16/2020     Lab Results   Component Value Date    NTBNP 5,288 (H) 05/01/2020              Dr Mónica Jean MD [facility]      "This note has been constructed using a voice recognition system  Therefore there may be syntax, spelling, and/or grammatical errors   Please call if you have any questions  "

## 2020-08-18 ENCOUNTER — APPOINTMENT (OUTPATIENT)
Dept: LAB | Facility: CLINIC | Age: 50
End: 2020-08-18
Payer: COMMERCIAL

## 2020-08-18 DIAGNOSIS — I42.0 DILATED CARDIOMYOPATHY (HCC): ICD-10-CM

## 2020-08-18 DIAGNOSIS — I27.21 SECONDARY PULMONARY ARTERIAL HYPERTENSION (HCC): ICD-10-CM

## 2020-08-18 DIAGNOSIS — I10 BENIGN ESSENTIAL HYPERTENSION: ICD-10-CM

## 2020-08-18 DIAGNOSIS — Z72.89 ALCOHOL USE: ICD-10-CM

## 2020-08-18 DIAGNOSIS — J96.11 CHRONIC RESPIRATORY FAILURE WITH HYPOXIA (HCC): ICD-10-CM

## 2020-08-18 DIAGNOSIS — R06.00 DOE (DYSPNEA ON EXERTION): ICD-10-CM

## 2020-08-18 DIAGNOSIS — G47.33 OSA (OBSTRUCTIVE SLEEP APNEA): ICD-10-CM

## 2020-08-18 DIAGNOSIS — I25.10 CORONARY ARTERY DISEASE INVOLVING NATIVE CORONARY ARTERY OF NATIVE HEART WITHOUT ANGINA PECTORIS: ICD-10-CM

## 2020-08-18 DIAGNOSIS — J43.2 CENTRILOBULAR EMPHYSEMA (HCC): ICD-10-CM

## 2020-08-18 DIAGNOSIS — I50.42 CHRONIC COMBINED SYSTOLIC AND DIASTOLIC CONGESTIVE HEART FAILURE, NYHA CLASS 2 (HCC): ICD-10-CM

## 2020-08-18 LAB
ANION GAP SERPL CALCULATED.3IONS-SCNC: 4 MMOL/L (ref 4–13)
BUN SERPL-MCNC: 14 MG/DL (ref 5–25)
CALCIUM SERPL-MCNC: 9.1 MG/DL (ref 8.3–10.1)
CHLORIDE SERPL-SCNC: 110 MMOL/L (ref 100–108)
CO2 SERPL-SCNC: 28 MMOL/L (ref 21–32)
CREAT SERPL-MCNC: 1.23 MG/DL (ref 0.6–1.3)
GFR SERPL CREATININE-BSD FRML MDRD: 68 ML/MIN/1.73SQ M
GLUCOSE SERPL-MCNC: 143 MG/DL (ref 65–140)
POTASSIUM SERPL-SCNC: 4.3 MMOL/L (ref 3.5–5.3)
SODIUM SERPL-SCNC: 142 MMOL/L (ref 136–145)

## 2020-08-18 PROCEDURE — 80048 BASIC METABOLIC PNL TOTAL CA: CPT

## 2020-08-18 PROCEDURE — 36415 COLL VENOUS BLD VENIPUNCTURE: CPT

## 2020-08-20 ENCOUNTER — OFFICE VISIT (OUTPATIENT)
Dept: CARDIOLOGY CLINIC | Facility: CLINIC | Age: 50
End: 2020-08-20
Payer: COMMERCIAL

## 2020-08-20 ENCOUNTER — TELEPHONE (OUTPATIENT)
Dept: GASTROENTEROLOGY | Facility: AMBULARY SURGERY CENTER | Age: 50
End: 2020-08-20

## 2020-08-20 VITALS
DIASTOLIC BLOOD PRESSURE: 84 MMHG | HEART RATE: 84 BPM | HEIGHT: 70 IN | WEIGHT: 247 LBS | SYSTOLIC BLOOD PRESSURE: 124 MMHG | TEMPERATURE: 97.9 F | BODY MASS INDEX: 35.36 KG/M2 | OXYGEN SATURATION: 96 %

## 2020-08-20 DIAGNOSIS — I42.0 DILATED CARDIOMYOPATHY (HCC): ICD-10-CM

## 2020-08-20 DIAGNOSIS — I50.42 CHRONIC COMBINED SYSTOLIC AND DIASTOLIC CONGESTIVE HEART FAILURE (HCC): ICD-10-CM

## 2020-08-20 DIAGNOSIS — G47.33 OSA (OBSTRUCTIVE SLEEP APNEA): ICD-10-CM

## 2020-08-20 DIAGNOSIS — I50.43 ACUTE ON CHRONIC COMBINED SYSTOLIC AND DIASTOLIC CHF (CONGESTIVE HEART FAILURE) (HCC): ICD-10-CM

## 2020-08-20 DIAGNOSIS — J43.2 CENTRILOBULAR EMPHYSEMA (HCC): ICD-10-CM

## 2020-08-20 DIAGNOSIS — Z12.11 ENCOUNTER FOR SCREENING COLONOSCOPY: ICD-10-CM

## 2020-08-20 DIAGNOSIS — I27.21 SECONDARY PULMONARY ARTERIAL HYPERTENSION (HCC): ICD-10-CM

## 2020-08-20 DIAGNOSIS — I10 BENIGN ESSENTIAL HYPERTENSION: ICD-10-CM

## 2020-08-20 DIAGNOSIS — R06.00 DOE (DYSPNEA ON EXERTION): ICD-10-CM

## 2020-08-20 DIAGNOSIS — I25.10 CORONARY ARTERY DISEASE INVOLVING NATIVE CORONARY ARTERY OF NATIVE HEART WITHOUT ANGINA PECTORIS: ICD-10-CM

## 2020-08-20 PROCEDURE — 3008F BODY MASS INDEX DOCD: CPT | Performed by: INTERNAL MEDICINE

## 2020-08-20 PROCEDURE — 3079F DIAST BP 80-89 MM HG: CPT | Performed by: INTERNAL MEDICINE

## 2020-08-20 PROCEDURE — 99214 OFFICE O/P EST MOD 30 MIN: CPT | Performed by: INTERNAL MEDICINE

## 2020-08-20 PROCEDURE — 1036F TOBACCO NON-USER: CPT | Performed by: INTERNAL MEDICINE

## 2020-08-20 PROCEDURE — 3074F SYST BP LT 130 MM HG: CPT | Performed by: INTERNAL MEDICINE

## 2020-08-20 RX ORDER — SPIRONOLACTONE 25 MG/1
12.5 TABLET ORAL DAILY
Qty: 90 TABLET | Refills: 1 | Status: SHIPPED | OUTPATIENT
Start: 2020-08-20 | End: 2021-07-27 | Stop reason: SDUPTHER

## 2020-09-15 ENCOUNTER — HOSPITAL ENCOUNTER (OUTPATIENT)
Dept: NON INVASIVE DIAGNOSTICS | Facility: HOSPITAL | Age: 50
Discharge: HOME | End: 2020-09-15
Attending: INTERNAL MEDICINE
Payer: COMMERCIAL

## 2020-09-15 DIAGNOSIS — I42.0 DILATED CARDIOMYOPATHY (HCC): ICD-10-CM

## 2020-09-15 DIAGNOSIS — I50.42 CHRONIC COMBINED SYSTOLIC AND DIASTOLIC CONGESTIVE HEART FAILURE (HCC): ICD-10-CM

## 2020-09-15 PROCEDURE — 93306 TTE W/DOPPLER COMPLETE: CPT

## 2020-09-16 ENCOUNTER — TELEPHONE (OUTPATIENT)
Dept: CARDIOLOGY CLINIC | Facility: CLINIC | Age: 50
End: 2020-09-16

## 2020-09-16 PROCEDURE — 93306 TTE W/DOPPLER COMPLETE: CPT | Performed by: INTERNAL MEDICINE

## 2020-09-16 NOTE — TELEPHONE ENCOUNTER
----- Message from Bal Dominguez MD sent at 9/16/2020  3:00 PM EDT -----  And echo shows his EF has improved to 55%  His EF used to be 25%  He does not need defibrillator    If he is using defibrillator vest he can take it off and return it

## 2020-09-18 ENCOUNTER — OFFICE VISIT (OUTPATIENT)
Dept: GASTROENTEROLOGY | Facility: CLINIC | Age: 50
End: 2020-09-18
Payer: COMMERCIAL

## 2020-09-18 VITALS — TEMPERATURE: 97.9 F | HEART RATE: 102 BPM | WEIGHT: 245 LBS | BODY MASS INDEX: 35.07 KG/M2 | HEIGHT: 70 IN

## 2020-09-18 DIAGNOSIS — R19.7 DIARRHEA, UNSPECIFIED TYPE: Primary | ICD-10-CM

## 2020-09-18 DIAGNOSIS — Z80.0 FAMILY HISTORY OF COLON CANCER: ICD-10-CM

## 2020-09-18 DIAGNOSIS — R11.2 NAUSEA AND VOMITING, INTRACTABILITY OF VOMITING NOT SPECIFIED, UNSPECIFIED VOMITING TYPE: ICD-10-CM

## 2020-09-18 DIAGNOSIS — K21.9 GASTROESOPHAGEAL REFLUX DISEASE, ESOPHAGITIS PRESENCE NOT SPECIFIED: ICD-10-CM

## 2020-09-18 DIAGNOSIS — Z12.11 ENCOUNTER FOR SCREENING COLONOSCOPY: ICD-10-CM

## 2020-09-18 PROCEDURE — 1036F TOBACCO NON-USER: CPT | Performed by: INTERNAL MEDICINE

## 2020-09-18 PROCEDURE — 99203 OFFICE O/P NEW LOW 30 MIN: CPT | Performed by: INTERNAL MEDICINE

## 2020-09-18 RX ORDER — ONDANSETRON 4 MG/1
4 TABLET, FILM COATED ORAL EVERY 8 HOURS PRN
Qty: 30 TABLET | Refills: 1 | Status: SHIPPED | OUTPATIENT
Start: 2020-09-18 | End: 2021-03-25 | Stop reason: HOSPADM

## 2020-09-18 NOTE — PATIENT INSTRUCTIONS
Heartburn, acid reflux   - continue Zegrid 40 mg daily (take 30 minutes before breakfast)  - schedule EGD  - lifestyle changes to reduce acid reflux: cut down on caffeine (coffee, tea, soda, chocolate), peppermint, spicy/greasy foods, trigger foods (citrus, red sauces); avoid laying down for 2-3 hours after meals; elevate the head of your bed; avoid tight clothing around abdomen; and lose weight    Nausea and vomiting  - start zofran 4 mg as needed up to three times per day    Diarrhea  - check blood and stool tests (TSH, Celiac ab profile, CRP, stool enteric panel, c diff, O&P, giardia ab, fecal calprotectin)  - start fiber supplement (metamucil, citrucel, or benefiber) daily (over the counter, generic)  - start imodium/loperamide 2 mg tab as needed up to four times per day    Family history of colon cancer  - mother had colon cancer  - schedule colonoscopy  - discussed prep instructions with golytely (if co-pay is more than $30 give office a call and we can order different prep)  - reviewed details of procedure, risks and benefits of procedure    Follow up in 6 months

## 2020-09-18 NOTE — PROGRESS NOTES
[facility] - Outpatient Consultation  Germain Harper 45 y o  male MRN: [id number]  Encounter: [id number]          ASSESSMENT AND PLAN:      1  GERD  - occasionally has nausea vomiting 2  - continue Zegrid 40 mg daily (take 30 minutes before breakfast)  - schedule EGD  - lifestyle changes to reduce acid reflux  Nausea and vomiting  - start zofran 4 mg as needed up to three times per day    2  Diarrhea  - check blood and stool tests (TSH, Celiac ab profile, CRP, stool enteric panel, c diff, O&P, giardia ab, fecal calprotectin)  - start fiber supplement (metamucil, citrucel, or benefiber) daily (over the counter, generic)  - start imodium/loperamide 2 mg tab as needed up to four times per day    3  Family history of colon cancer  - mother had colon cancer  - schedule colonoscopy  - discussed prep instructions with golytely (if co-pay is more than $30 give office a call and we can order different prep)  - reviewed details of procedure, risks and benefits of procedure    4   Co-morbidities: CHF (EF improved), PARKER (suppose to be on CPAP), COPD, HTN, CAD, prior admission for sepsis with bactermia (gram positives)    Follow up in 6 months    ______________________________________________________________________    HPI:  43-year-old man who presents to discuss diarrhea, GERD, and family history of colon cancer  Co-morbidities: CHF (EF improved), PARKER (suppose to be on CPAP), COPD, HTN, CAD, prior admission for sepsis with bactermia (gram positives)    Diarrhea  - daily  - 3 BMs per day  - BSS 5  - no blood in stool, no melena  - 4 colonoscopies in the past due to diarrhea and family history of colon cancer  - +well water    Nausea and vomiting   - 4x since May 2020  - usually prior to loose stools  - lasts a long time    GERD  - currently on Zegrid 40 mg qd  - when he forgets to take, has heartburn  - last EGD about 10 years    Colon cancer screening  - last colonoscopy >10 years  - mom had colon cancer in late 40s-early 46s  - no changes in stool habits (no new constipation, diarrhea or thinner stools)  - no melena or hematochezia  - no abd pain, wt loss or new fatigue  - no chest pain or shortness of with exertion    ROS: No constipation, odynophagia, dysphagia, hematemesis, early satiety, appetite changes  FH: - mom had colon cancer in late 40s-early 46s  SH: quit tobacco 6-7 years ago, alcohol 1-2x per week  Prior endoscopies:      REVIEW OF SYSTEMS:    CONSTITUTIONAL: Denies any fever, chills, rigors, and weight loss  HEENT: No earache or tinnitus  Denies hearing loss or visual disturbances  CARDIOVASCULAR: No chest pain or palpitations  RESPIRATORY: Denies any cough, hemoptysis, shortness of breath or dyspnea on exertion  GASTROINTESTINAL: As noted in the History of Present Illness  GENITOURINARY: No problems with urination  Denies any hematuria or dysuria  NEUROLOGIC: No dizziness or vertigo, denies headaches  MUSCULOSKELETAL: Denies any muscle or joint pain  SKIN: Denies skin rashes or itching  ENDOCRINE: Denies excessive thirst  Denies intolerance to heat or cold  PSYCHOSOCIAL: Denies depression or anxiety  Denies any recent memory loss         Historical Information   Past Medical History:   Diagnosis Date    Acute hypoxemic respiratory failure (HCC)     Cardiomyopathy [facility] )     Centrilobular emphysema ([facility] [address] ) 5/20/2020    CHF (congestive heart failure) (HCC)     Combined systolic and diastolic cardiac dysfunction     Coronary artery disease     Hypertension     Mixed sleep apnea     Secondary pulmonary arterial hypertension (HCC)     Volume overload      Past Surgical History:   Procedure Laterality Date    CHOLECYSTECTOMY      FRACTURE SURGERY      MANDIBLE SURGERY      TONSILLECTOMY       Social History   Social History     Substance and Sexual Activity   Alcohol Use Yes    Alcohol/week: 2 0 standard drinks    Types: 2 Cans of beer per week    Frequency: 2-4 times a month    Drinks per session: 1 or 2    Binge frequency: Monthly     Social History     Substance and Sexual Activity   Drug Use Not Currently     Social History     Tobacco Use   Smoking Status Former Smoker    Packs/day:     Years: 25     Pack years:     Types: Cigarettes    Last attempt to quit: 2014    Years since quittin 3   Smokeless Tobacco Never Used     Family History   Problem Relation Age of Onset    Heart disease Father     Cancer Father     Cancer Mother        Meds/Allergies       Current Outpatient Medications:     acetaminophen (TYLENOL) 325 mg tablet    albuterol (ProAir HFA) 90 mcg/act inhaler    aspirin 81 mg chewable tablet    atorvastatin (LIPITOR) 40 mg tablet    carvedilol (COREG) 25 mg tablet    furosemide (LASIX) 20 mg tablet    lisinopril (ZESTRIL) 20 mg tablet    mupirocin (BACTROBAN) 2 % ointment    Omeprazole-Sodium Bicarbonate (ZEGERID OTC PO)    ondansetron (ZOFRAN) 4 mg tablet    pantoprazole (PROTONIX) 40 mg tablet    spironolactone (ALDACTONE) 25 mg tablet    umeclidinium bromide (INCRUSE ELLIPTA) 62 5 mcg/inh AEPB inhaler    Allergies   Allergen Reactions    Cheese     Chocolate      Reaction Date: 2007;     Nuts      Reaction Date: 2007;            Objective     There were no vitals taken for this visit  There is no height or weight on file to calculate BMI  PHYSICAL EXAM:      General Appearance:   Alert, cooperative, no distress   HEENT:   Normocephalic, atraumatic, anicteric  Neck:  Supple, symmetrical, trachea midline   Lungs:   Clear to auscultation bilaterally; no rales, rhonchi or wheezing; respirations unlabored    Heart[de-identified]   Regular rate and rhythm; no murmur, rub, or gallop     Abdomen:   Soft, non-tender, non-distended; normal bowel sounds; no masses, no organomegaly    Rectal:   Deferred   Neuro:   Alert, oriented, no gross deficits, normal strength and tone   Extremities:  No cyanosis, clubbing or edema Psych:  Normal mood and affect    Skin:  No jaundice, rashes, or lesions    Lymph nodes:  No palpable cervical lymphadenopathy        Lab Results:   No visits with results within 1 Day(s) from this visit  Latest known visit with results is:   Appointment on 08/18/2020   Component Date Value    Sodium 08/18/2020 142     Potassium 08/18/2020 4 3     Chloride 08/18/2020 110*    CO2 08/18/2020 28     ANION GAP 08/18/2020 4     BUN 08/18/2020 14     Creatinine 08/18/2020 1 23     Glucose 08/18/2020 143*    Calcium 08/18/2020 9 1     eGFR 08/18/2020 68          Radiology Results:   No results found

## 2020-09-25 DIAGNOSIS — Z11.59 SCREENING FOR VIRAL DISEASE: ICD-10-CM

## 2020-09-25 PROCEDURE — U0003 INFECTIOUS AGENT DETECTION BY NUCLEIC ACID (DNA OR RNA); SEVERE ACUTE RESPIRATORY SYNDROME CORONAVIRUS 2 (SARS-COV-2) (CORONAVIRUS DISEASE [COVID-19]), AMPLIFIED PROBE TECHNIQUE, MAKING USE OF HIGH THROUGHPUT TECHNOLOGIES AS DESCRIBED BY CMS-2020-01-R: HCPCS | Performed by: INTERNAL MEDICINE

## 2020-09-26 LAB — SARS-COV-2 RNA SPEC QL NAA+PROBE: NOT DETECTED

## 2020-09-30 ENCOUNTER — TELEPHONE (OUTPATIENT)
Dept: CARDIOLOGY CLINIC | Facility: CLINIC | Age: 50
End: 2020-09-30

## 2020-09-30 NOTE — TELEPHONE ENCOUNTER
Requested office notes from 8/2020 to present, and aug/sept 2020 echo - faxed to [facility] - [phone]

## 2020-10-01 ENCOUNTER — ANESTHESIA EVENT (OUTPATIENT)
Dept: GASTROENTEROLOGY | Facility: AMBULARY SURGERY CENTER | Age: 50
End: 2020-10-01

## 2020-10-01 ENCOUNTER — HOSPITAL ENCOUNTER (OUTPATIENT)
Dept: GASTROENTEROLOGY | Facility: AMBULARY SURGERY CENTER | Age: 50
Setting detail: OUTPATIENT SURGERY
Discharge: HOME | End: 2020-10-01
Attending: INTERNAL MEDICINE
Payer: COMMERCIAL

## 2020-10-01 ENCOUNTER — ANESTHESIA (OUTPATIENT)
Dept: GASTROENTEROLOGY | Facility: AMBULARY SURGERY CENTER | Age: 50
End: 2020-10-01

## 2020-10-01 VITALS
TEMPERATURE: 97.4 F | BODY MASS INDEX: 34.36 KG/M2 | RESPIRATION RATE: 20 BRPM | HEART RATE: 82 BPM | SYSTOLIC BLOOD PRESSURE: 140 MMHG | OXYGEN SATURATION: 99 % | WEIGHT: 240 LBS | HEIGHT: 70 IN | DIASTOLIC BLOOD PRESSURE: 91 MMHG

## 2020-10-01 VITALS — HEART RATE: 88 BPM

## 2020-10-01 DIAGNOSIS — K21.9 GASTROESOPHAGEAL REFLUX DISEASE: ICD-10-CM

## 2020-10-01 DIAGNOSIS — Z80.0 FAMILY HISTORY OF COLON CANCER: ICD-10-CM

## 2020-10-01 DIAGNOSIS — R11.2 NAUSEA AND VOMITING, INTRACTABILITY OF VOMITING NOT SPECIFIED, UNSPECIFIED VOMITING TYPE: ICD-10-CM

## 2020-10-01 DIAGNOSIS — Z12.11 ENCOUNTER FOR SCREENING COLONOSCOPY: ICD-10-CM

## 2020-10-01 DIAGNOSIS — R19.7 DIARRHEA, UNSPECIFIED TYPE: ICD-10-CM

## 2020-10-01 PROCEDURE — 43239 EGD BIOPSY SINGLE/MULTIPLE: CPT | Performed by: INTERNAL MEDICINE

## 2020-10-01 PROCEDURE — 88305 TISSUE EXAM BY PATHOLOGIST: CPT | Performed by: PATHOLOGY

## 2020-10-01 PROCEDURE — 45380 COLONOSCOPY AND BIOPSY: CPT | Performed by: INTERNAL MEDICINE

## 2020-10-01 PROCEDURE — NC001 PR NO CHARGE: Performed by: INTERNAL MEDICINE

## 2020-10-01 RX ORDER — PROPOFOL 10 MG/ML
INJECTION, EMULSION INTRAVENOUS AS NEEDED
Status: DISCONTINUED | OUTPATIENT
Start: 2020-10-01 | End: 2020-10-01

## 2020-10-01 RX ORDER — LIDOCAINE HYDROCHLORIDE 20 MG/ML
INJECTION, SOLUTION EPIDURAL; INFILTRATION; INTRACAUDAL; PERINEURAL AS NEEDED
Status: DISCONTINUED | OUTPATIENT
Start: 2020-10-01 | End: 2020-10-01

## 2020-10-01 RX ORDER — PROPOFOL 10 MG/ML
INJECTION, EMULSION INTRAVENOUS CONTINUOUS PRN
Status: DISCONTINUED | OUTPATIENT
Start: 2020-10-01 | End: 2020-10-01

## 2020-10-01 RX ORDER — SODIUM CHLORIDE, SODIUM LACTATE, POTASSIUM CHLORIDE, CALCIUM CHLORIDE 600; 310; 30; 20 MG/100ML; MG/100ML; MG/100ML; MG/100ML
125 INJECTION, SOLUTION INTRAVENOUS CONTINUOUS
Status: DISCONTINUED | OUTPATIENT
Start: 2020-10-01 | End: 2020-10-05 | Stop reason: HOSPADM

## 2020-10-01 RX ADMIN — SODIUM CHLORIDE, SODIUM LACTATE, POTASSIUM CHLORIDE, AND CALCIUM CHLORIDE: .6; .31; .03; .02 INJECTION, SOLUTION INTRAVENOUS at 14:00

## 2020-10-01 RX ADMIN — LIDOCAINE HYDROCHLORIDE 100 MG: 20 INJECTION, SOLUTION EPIDURAL; INFILTRATION; INTRACAUDAL; PERINEURAL at 14:15

## 2020-10-01 RX ADMIN — PROPOFOL 150 MCG/KG/MIN: 10 INJECTION, EMULSION INTRAVENOUS at 14:15

## 2020-10-01 RX ADMIN — SODIUM CHLORIDE, SODIUM LACTATE, POTASSIUM CHLORIDE, AND CALCIUM CHLORIDE: .6; .31; .03; .02 INJECTION, SOLUTION INTRAVENOUS at 13:36

## 2020-10-01 RX ADMIN — PROPOFOL 150 MG: 10 INJECTION, EMULSION INTRAVENOUS at 14:15

## 2020-10-20 ENCOUNTER — TELEPHONE (OUTPATIENT)
Dept: GASTROENTEROLOGY | Facility: AMBULARY SURGERY CENTER | Age: 50
End: 2020-10-20

## 2021-02-10 DIAGNOSIS — I50.43 ACUTE ON CHRONIC COMBINED SYSTOLIC AND DIASTOLIC CHF (CONGESTIVE HEART FAILURE) (HCC): ICD-10-CM

## 2021-02-10 RX ORDER — FUROSEMIDE 20 MG/1
20 TABLET ORAL DAILY
Qty: 30 TABLET | Refills: 5 | Status: SHIPPED | OUTPATIENT
Start: 2021-02-10 | End: 2021-08-24 | Stop reason: SDUPTHER

## 2021-02-11 ENCOUNTER — OFFICE VISIT (OUTPATIENT)
Dept: CARDIOLOGY CLINIC | Facility: CLINIC | Age: 51
End: 2021-02-11
Payer: COMMERCIAL

## 2021-02-11 VITALS
BODY MASS INDEX: 37.51 KG/M2 | SYSTOLIC BLOOD PRESSURE: 120 MMHG | HEIGHT: 70 IN | HEART RATE: 79 BPM | TEMPERATURE: 97.9 F | OXYGEN SATURATION: 95 % | WEIGHT: 262 LBS | DIASTOLIC BLOOD PRESSURE: 70 MMHG

## 2021-02-11 DIAGNOSIS — I42.0 DILATED CARDIOMYOPATHY (HCC): ICD-10-CM

## 2021-02-11 DIAGNOSIS — I50.42 CHRONIC COMBINED SYSTOLIC AND DIASTOLIC CONGESTIVE HEART FAILURE (HCC): ICD-10-CM

## 2021-02-11 DIAGNOSIS — G47.33 OSA (OBSTRUCTIVE SLEEP APNEA): ICD-10-CM

## 2021-02-11 DIAGNOSIS — I25.10 CORONARY ARTERY DISEASE INVOLVING NATIVE CORONARY ARTERY OF NATIVE HEART WITHOUT ANGINA PECTORIS: ICD-10-CM

## 2021-02-11 DIAGNOSIS — I10 BENIGN ESSENTIAL HYPERTENSION: ICD-10-CM

## 2021-02-11 DIAGNOSIS — R06.00 DOE (DYSPNEA ON EXERTION): ICD-10-CM

## 2021-02-11 DIAGNOSIS — J43.2 CENTRILOBULAR EMPHYSEMA (HCC): ICD-10-CM

## 2021-02-11 PROCEDURE — 3008F BODY MASS INDEX DOCD: CPT | Performed by: PHYSICIAN ASSISTANT

## 2021-02-11 PROCEDURE — 99214 OFFICE O/P EST MOD 30 MIN: CPT | Performed by: INTERNAL MEDICINE

## 2021-02-11 PROCEDURE — 93000 ELECTROCARDIOGRAM COMPLETE: CPT | Performed by: INTERNAL MEDICINE

## 2021-02-11 NOTE — PROGRESS NOTES
Progress Note - Cardiology Office  [facility]    David Centeno 45 y o  male MRN: [id number]  : 1970  Encounter: [id number]      Assessment:     1  ZAYAS (dyspnea on exertion)    2  Chronic combined systolic and diastolic congestive heart failure ([facility] [address] )    3  D[facility] )    4  Coronary artery disease involving native coronary artery of native heart without angina pectoris    5  Benign essential hypertension    6  PARKER (obstructive sleep apnea)    7  Centrilobular emphysema ([facility] )        Discussion Summary and Plan:  1  Combined systolic and diastolic heart failure with dilated cardiomyopathy  Patient EF was found to be 25- 30%  Will optimize heart failure medications  Increase lisinopril to 20 mg daily  Continue Coreg, Aldactone, aspirin and statins  Patient has some exertional shortness of breath  He has gained about 50 lb weight since his discharge  He is now again motivated to lose weight  His EF has improved to 55%  This was discussed with him  Continue medical Rx  There is no evidence of any volume overload  Vitals has been stable      2  Nonischemic cardiomyopathy  Most likely etiology longstanding uncontrolled hypertension in the setting of LBBB and alcohol abuse  Cardiac catheterization now evidence of obstructive CAD  Continue medical Rx     3  CKD with history of kidney injury  Other electrolytes were acceptable  BMP acceptable continue increased dose of lisinopril continue same dose of diuretics and Aldactone  Will check electrolytes magnesium and LFTs      4  Dyspnea on exertion  Still present etiology is multifactorial   He used to be heavy smoker now quit has gained about 50 lb BMI now around 38  Advised him to lose weight     5  Chronic hypoxic respiratory failure  On oxygen therapy at nighttime  Could not tolerate CPAP machine     6  Dyslipidemia on statin  He is tolerating it very well    Check fasting lipids and LFTs     7  Previous history of alcohol abuse and tobacco abuse  He still drinks  Advised him to stop drinking otherwise high risk for development and worsening of cardiomyopathy  This was discussed with him again    8  Obesity with BMI of 35 37 now  Need to lose weight he seems to be motivated  Continue current Rx follow-up 4 months  Please call [phone] if any questions  Counseling :  A description of the counseling  Goals and Barriers  Patient's ability to self care: Yes  Medication side effect reviewed with patient in detail and all their questions answered to their satisfaction  HPI :     Caitlin Sandoval is a 45y o  year old male who came for follow up  Patient was recently admitted to [facility] with shortness of breath  He was diagnosed to have pulmonary edema later non workup shows that he had cardiomyopathy with EF around 20-30% with paradoxical septal motion secondary to his LBBB  During workup of also found to have hypertension, LBBB, history of emphysema/COPD  He used to smoke heavily quit 6 years ago  He used to drink also now he drinks occasionally  He underwent cardiac catheterization found to have mild nonobstructive disease and was started on medical therapy  He has a defibrillator vest   No ICD shocks  Since hospitalization he has lost about 30 lb  He is breathing much better there is no leg edema no nausea no vomiting no other issues  He feels his doing well much better than before  02/11/2021  Above reviewed  Patient came for follow-up  He had history of cardiomyopathy with EF around 30% which has improved to about 55%  He had LBBB at that time  Now QRS is narrow  He had history of COPD/emphysema, sleep apnea obesity with BMI now around 37  He has gained about 50 lb since discharge from the hospital and he has no leg edema  He did feel some exertional shortness of breath  He says he gets very depressed and sad as he has lost friends and he start eating    He is breathing still much it he bed working he works in a bar and he drinks some alcohol but not as as he used to drink in the past   No other issues at this time  He does not smoke  He had history of sleep apnea but he could not use CPAP  In the hospitalization after discharge for some time he lost about 30 lb he has gained all his weight back  Review of Systems   Constitutional: Positive for unexpected weight change  Negative for activity change, chills, diaphoresis and fever  HENT: Negative for congestion  Eyes: Negative for discharge and redness  Respiratory: Positive for shortness of breath  Negative for cough, chest tightness and wheezing  Exertional   Cardiovascular: Negative  Negative for chest pain, palpitations and leg swelling  Gastrointestinal: Negative for abdominal pain, diarrhea and nausea  Endocrine: Negative  Genitourinary: Negative for decreased urine volume and urgency  Musculoskeletal: Positive for arthralgias  Negative for back pain and gait problem  Skin: Negative for rash and wound  Allergic/Immunologic: Negative  Neurological: Negative for dizziness, seizures, syncope, weakness, light-headedness and headaches  Hematological: Negative  Psychiatric/Behavioral: Negative for agitation and confusion  The patient is nervous/anxious          Historical Information   Past Medical History:   Diagnosis Date    Acute hypoxemic respiratory failure (HCC)     Cardiomyopathy ([facility] [address] )     Centrilobular emphysema ([facility] ) 5/20/2020    CHF (congestive heart failure) (HCC)     55% no pacer needed    Combined systolic and diastolic cardiac dysfunction     Coronary artery disease     Hypertension     Mixed sleep apnea     Secondary pulmonary arterial hypertension (HCC)     Volume overload      Past Surgical History:   Procedure Laterality Date    CHOLECYSTECTOMY      FRACTURE SURGERY      jaw    MANDIBLE SURGERY      TONSILLECTOMY       Social History     Substance and Sexual Activity   Alcohol Use Yes    Alcohol/week: 2 0 standard drinks    Types: 2 Cans of beer per week    Frequency: 2-4 times a month    Drinks per session: 1 or 2    Binge frequency: Monthly    Comment: Last      Social History     Substance and Sexual Activity   Drug Use Yes    Types: Marijuana    Comment: pot cookie      Social History     Tobacco Use   Smoking Status Former Smoker    Packs/day:     Years: 25     Pack years:     Types: Cigarettes    Quit date: 2014    Years since quittin 7   Smokeless Tobacco Never Used     Family History:   Family History   Problem Relation Age of Onset    Heart disease Father     Cancer Father     Cancer Mother     Colon cancer Mother     No Known Problems Sister        Meds/Allergies     Allergies   Allergen Reactions    Cheese      Fever, nasal congestion    Chocolate      Fever, nasal congestion    Nuts      Fever, nasal congestion       Current Outpatient Medications:     acetaminophen (TYLENOL) 325 mg tablet, Take 650 mg by mouth every 6 (six) hours as needed for mild pain or fever, Disp: , Rfl:     aspirin 81 mg chewable tablet, Chew 1 tablet (81 mg total) daily, Disp: 30 tablet, Rfl: 0    atorvastatin (LIPITOR) 40 mg tablet, Take 1 tablet (40 mg total) by mouth daily with dinner, Disp: 30 tablet, Rfl: 11    carvedilol (COREG) 25 mg tablet, Take 1 tablet (25 mg total) by mouth 2 (two) times a day with meals, Disp: 60 tablet, Rfl: 11    furosemide (LASIX) 20 mg tablet, Take 1 tablet (20 mg total) by mouth daily, Disp: 30 tablet, Rfl: 5    lisinopril (ZESTRIL) 20 mg tablet, Take 1 tablet (20 mg total) by mouth daily, Disp: 90 tablet, Rfl: 1    Omeprazole-Sodium Bicarbonate (ZEGERID OTC PO), Take by mouth daily, Disp: , Rfl:     spironolactone (ALDACTONE) 25 mg tablet, Take 0 5 tablets (12 5 mg total) by mouth daily, Disp: 90 tablet, Rfl: 1    albuterol (ProAir HFA) 90 mcg/act inhaler, Inhale 2 puffs every 4 (four) hours as needed for wheezing or shortness of breath (Patient not taking: Reported on 2/11/2021), Disp: 8 5 g, Rfl: 0    ondansetron (ZOFRAN) 4 mg tablet, Take 1 tablet (4 mg total) by mouth every 8 (eight) hours as needed for nausea or vomiting (Patient not taking: Reported on 2/11/2021), Disp: 30 tablet, Rfl: 1    polyethylene glycol (GOLYTELY) 4000 mL solution, Take 4,000 mL by mouth once for 1 dose, Disp: 4000 mL, Rfl: 0    umeclidinium bromide (INCRUSE ELLIPTA) 62 5 mcg/inh AEPB inhaler, Inhale 1 puff daily (Patient not taking: Reported on 2/11/2021), Disp: 1 Inhaler, Rfl: 5    Vitals: Blood pressure 120/70, pulse 79, temperature 97 9 °F (36 6 °C), temperature source Temporal, height 5' 10" (1 778 m), weight 119 kg (262 lb), SpO2 95 %  Body mass index is 37 59 kg/m²  Vitals:    02/11/21 1039   Weight: 119 kg (262 lb)     BP Readings from Last 3 Encounters:   02/11/21 120/70   10/01/20 140/91   08/20/20 124/84       Physical Exam  Constitutional:       General: He is not in acute distress  Appearance: He is well-developed  He is not diaphoretic  HENT:      Head: Normocephalic and atraumatic  Eyes:      Pupils: Pupils are equal, round, and reactive to light  Neck:      Musculoskeletal: Neck supple  Thyroid: No thyromegaly  Vascular: No JVD  Trachea: No tracheal deviation  Cardiovascular:      Rate and Rhythm: Normal rate and regular rhythm  Heart sounds: S1 normal and S2 normal  Heart sounds not distant  Murmur present  Systolic (ejection) murmur present with a grade of 2/6  No friction rub  No gallop  No S3 or S4 sounds  Pulmonary:      Effort: Pulmonary effort is normal  No respiratory distress  Breath sounds: Normal breath sounds  No wheezing or rales  Chest:      Chest wall: No tenderness  Abdominal:      General: Bowel sounds are normal  There is no distension  Palpations: Abdomen is soft  Tenderness: There is no abdominal tenderness  Musculoskeletal:         General: No deformity  Skin:     General: Skin is warm and dry  Coloration: Skin is not pale  Findings: No rash  Neurological:      Mental Status: He is alert and oriented to person, place, and time  Psychiatric:         Behavior: Behavior normal          Judgment: Judgment normal          Diagnostic Studies Review Cardio:  Cardiac catheterization and echo report reviewed  EKG:  Patient has history of LBBB    Twelve lead EKG 2021 shows normal sinus rhythm heart rate 79 beats per minute QTC mildly prolonged  Cardiac testing:   Results for orders placed during the hospital encounter of 20   Echo complete with contrast if indicated    Narrative [address]  [address] [facility]   [phone]    Transthoracic Echocardiogram  2D, M-mode, Doppler, and Color Doppler    Study date:  02-May-2020    Patient: Nahomi Lunsford  MR number: [id number]  Account number: [de-identified]  : 1970  Age: 45 years  Gender: Male  Status: Inpatient  Location: Bedside  Height: 70 in  Weight: 270 4 lb  BP: 153/ 96 mmHg    Indications: SOB    Diagnoses: I50 9 - Heart failure, unspecified    Sonographer:  PRAMOD Adler  Referring Physician:  BRISEIDA Burnett  Group:  [facility]  Interpreting Physician:  Charissa Barba MD    SUMMARY    LEFT VENTRICLE:  The ventricle was mildly dilated  Systolic function was severely reduced  Ejection fraction was estimated in the range of 20 % to 30 %  There was severe diffuse hypokinesis  Wall thickness was mildly increased  There was mild concentric hypertrophy  Doppler parameters were consistent with a reversible restrictive pattern, indicative of decreased left ventricular diastolic compliance and/or increased left atrial pressure (grade 3 diastolic dysfunction)  VENTRICULAR SEPTUM:  There was moderate paradoxical motion   These changes are consistent with LBBB     RIGHT VENTRICLE:  The ventricle was mildly dilated  Systolic function was mildly reduced  LEFT ATRIUM:  The atrium was moderately dilated  RIGHT ATRIUM:  The atrium was mildly dilated  MITRAL VALVE:  There was mild regurgitation  TRICUSPID VALVE:  There was mild to moderate regurgitation  Estimated peak PA pressure was 45 to 50 mmHg  IVC, HEPATIC VEINS:  The inferior vena cava was mildly dilated  The respirophasic change in diameter was more than 50%  HISTORY: PRIOR HISTORY: HTN,Acute kidney injury,pulmonary edema,esophageal reflux,Luetscher's syndrome,panic disorder,seborrhea  PROCEDURE: The procedure was performed at the bedside  This was a routine study  The transthoracic approach was used  The study included complete 2D imaging, M-mode, complete spectral Doppler, and color Doppler  The heart rate was 95 bpm,  at the start of the study  Images were obtained from the parasternal, apical, subcostal, and suprasternal notch acoustic windows  Image quality was adequate  LEFT VENTRICLE: The ventricle was mildly dilated  Systolic function was severely reduced  Ejection fraction was estimated in the range of 20 % to 30 %  There was severe diffuse hypokinesis  Wall thickness was mildly increased  There was  mild concentric hypertrophy  DOPPLER: Doppler parameters were consistent with a reversible restrictive pattern, indicative of decreased left ventricular diastolic compliance and/or increased left atrial pressure (grade 3 diastolic  dysfunction)  VENTRICULAR SEPTUM: There was moderate paradoxical motion  These changes are consistent with LBBB  RIGHT VENTRICLE: The ventricle was mildly dilated  Systolic function was mildly reduced  LEFT ATRIUM: The atrium was moderately dilated  RIGHT ATRIUM: The atrium was mildly dilated  MITRAL VALVE: There was normal leaflet separation  DOPPLER: Transmitral velocity was minimally increased  There was no evidence for stenosis   There was mild regurgitation  AORTIC VALVE: The valve was trileaflet  Leaflets exhibited mildly increased thickness and normal cuspal separation  DOPPLER: Transaortic velocity was within the normal range  There was no evidence for stenosis  There was no regurgitation  TRICUSPID VALVE: DOPPLER: There was mild to moderate regurgitation  Estimated peak PA pressure was 45 to 50 mmHg  PULMONIC VALVE: DOPPLER: There was no significant regurgitation  PERICARDIUM: There was no thickening or calcification  There was no pericardial effusion  AORTA: The root exhibited normal size  SYSTEMIC VEINS: IVC: The inferior vena cava was mildly dilated  The respirophasic change in diameter was more than 50%  SYSTEM MEASUREMENT TABLES    2D mode  AoR Diam 2D: 3 8 cm  LA Diam (2D): 4 2 cm  LA/Ao (2D): 1 11  FS (2D Teich): 13 6 %  IVSd (2D): 1 26 cm  LVDEV: 152 cmï¾³  LVEDV MOD BP: 261 cmï¾³  LVESV: 108 cmï¾³  LVIDd(2D): 5 57 cm  LVISd (2D): 4 81 cm  LVOT Area 2D: 3 14 cmï¾²  LVPWd (2D): 1 18 cm  SV (Teich): 44 cmï¾³    Apical four chamber  LVEF A4C: 30 %    Apical two chamber  LVEF A2C: 26 %    Unspecified Scan Mode  MATTHEW Cont Eq (Peak Pierre): 2 56 cmï¾²  LVOT (VTI): 12 4 cm  LVOT Diam : 2 cm  LVOT Vmax: 985 mm/s  LVOT Vmax; Mean: 1010 mm/s  Peak Grad ; Mean: 4 mm[Hg]  SV (LVOT): 39 cmï¾³  VTI;Mean: 2 mm[Hg]  MATTHEW Cont Eq (VTI): 1 44 cmï¾²  MV Peak E Pierre   Mean: 1290 mm/s  MVA (PHT): 5 79 cmï¾²  PHT: 29 ms  Max P mm[Hg]  V Max: 3060 mm/s  Vmax: 2660 mm/s  RA Area: 27 9 cmï¾²  RA Volume: 105 7 cmï¾³  TAPSE: 1 5 cm    [employer] Accredited [facility]    Prepared and electronically signed by    Candi Nash MD  Signed 02-May-2020 13:42:41         Lab Review   Lab Results   Component Value Date    WBC 7 34 2020    HGB 16 0 2020    HCT 51 1 (H) 2020    MCV 93 2020    RDW 13 0 2020     2020     BMP:  Lab Results   Component Value Date    SODIUM 142 08/18/2020    K 4 3 08/18/2020     (H) 08/18/2020    CO2 28 08/18/2020    BUN 14 08/18/2020    CREATININE 1 23 08/18/2020    GLUC 143 (H) 08/18/2020    GLUF 105 (H) 05/28/2020    CALCIUM 9 1 08/18/2020    EGFR 68 08/18/2020    MG 2 7 (H) 05/17/2020     LFT:  Lab Results   Component Value Date    AST 16 05/16/2020    ALT 25 05/16/2020    ALKPHOS 66 05/16/2020    TP 6 9 05/16/2020    ALB 3 5 05/16/2020      Lab Results   Component Value Date    XBS1JRIRNGMC 8 798 (H) 05/01/2020     No results found for: HGBA1C  Lipid Profile:   Lab Results   Component Value Date    CHOLESTEROL 115 05/04/2020    HDL 32 (L) 05/04/2020    LDLCALC 57 05/04/2020    TRIG 130 05/04/2020     Lab Results   Component Value Date    CHOLESTEROL 115 05/04/2020     Lab Results   Component Value Date    TROPONINI 0 02 05/16/2020     Lab Results   Component Value Date    NTBNP 5,288 (H) 05/01/2020              Dr Candi Nash MD [facility] [place]      "This note has been constructed using a voice recognition system  Therefore there may be syntax, spelling, and/or grammatical errors   Please call if you have any questions  "

## 2021-02-17 DIAGNOSIS — I10 UNCONTROLLED HYPERTENSION: ICD-10-CM

## 2021-02-17 RX ORDER — CARVEDILOL 25 MG/1
25 TABLET ORAL 2 TIMES DAILY WITH MEALS
Qty: 60 TABLET | Refills: 11 | Status: SHIPPED | OUTPATIENT
Start: 2021-02-17 | End: 2022-03-01 | Stop reason: SDUPTHER

## 2021-03-17 ENCOUNTER — OFFICE VISIT (OUTPATIENT)
Dept: URGENT CARE | Facility: CLINIC | Age: 51
End: 2021-03-17
Payer: COMMERCIAL

## 2021-03-17 VITALS
TEMPERATURE: 99.6 F | DIASTOLIC BLOOD PRESSURE: 90 MMHG | SYSTOLIC BLOOD PRESSURE: 174 MMHG | RESPIRATION RATE: 24 BRPM | OXYGEN SATURATION: 96 % | HEART RATE: 114 BPM

## 2021-03-17 DIAGNOSIS — R05.9 COUGH: ICD-10-CM

## 2021-03-17 DIAGNOSIS — R50.9 FEVER, UNSPECIFIED FEVER CAUSE: Primary | ICD-10-CM

## 2021-03-17 PROCEDURE — 3077F SYST BP >= 140 MM HG: CPT | Performed by: PHYSICIAN ASSISTANT

## 2021-03-17 PROCEDURE — 1036F TOBACCO NON-USER: CPT | Performed by: PHYSICIAN ASSISTANT

## 2021-03-17 PROCEDURE — U0003 INFECTIOUS AGENT DETECTION BY NUCLEIC ACID (DNA OR RNA); SEVERE ACUTE RESPIRATORY SYNDROME CORONAVIRUS 2 (SARS-COV-2) (CORONAVIRUS DISEASE [COVID-19]), AMPLIFIED PROBE TECHNIQUE, MAKING USE OF HIGH THROUGHPUT TECHNOLOGIES AS DESCRIBED BY CMS-2020-01-R: HCPCS | Performed by: PHYSICIAN ASSISTANT

## 2021-03-17 PROCEDURE — 3080F DIAST BP >= 90 MM HG: CPT | Performed by: PHYSICIAN ASSISTANT

## 2021-03-17 PROCEDURE — 99213 OFFICE O/P EST LOW 20 MIN: CPT | Performed by: PHYSICIAN ASSISTANT

## 2021-03-17 PROCEDURE — U0005 INFEC AGEN DETEC AMPLI PROBE: HCPCS | Performed by: PHYSICIAN ASSISTANT

## 2021-03-18 LAB — SARS-COV-2 RNA RESP QL NAA+PROBE: POSITIVE

## 2021-03-19 ENCOUNTER — TELEPHONE (OUTPATIENT)
Dept: URGENT CARE | Facility: CLINIC | Age: 51
End: 2021-03-19

## 2021-03-19 NOTE — TELEPHONE ENCOUNTER
Patient informed of positive results  States feeling better but still has cough and fever  Informed to remain in quarantine until 10 days from symptom start and fever free for 24 hrs

## 2021-03-23 ENCOUNTER — APPOINTMENT (EMERGENCY)
Dept: RADIOLOGY | Facility: HOSPITAL | Age: 51
DRG: 079 | End: 2021-03-23
Payer: COMMERCIAL

## 2021-03-23 ENCOUNTER — HOSPITAL ENCOUNTER (INPATIENT)
Facility: HOSPITAL | Age: 51
LOS: 2 days | Discharge: HOME | DRG: 079 | End: 2021-03-25
Attending: EMERGENCY MEDICINE | Admitting: FAMILY MEDICINE
Payer: COMMERCIAL

## 2021-03-23 ENCOUNTER — TELEPHONE (OUTPATIENT)
Dept: URGENT CARE | Facility: CLINIC | Age: 51
End: 2021-03-23

## 2021-03-23 DIAGNOSIS — U07.1 COVID-19: Primary | ICD-10-CM

## 2021-03-23 DIAGNOSIS — B96.89 ACUTE BACTERIAL BRONCHITIS: ICD-10-CM

## 2021-03-23 DIAGNOSIS — J43.2 CENTRILOBULAR EMPHYSEMA (HCC): ICD-10-CM

## 2021-03-23 DIAGNOSIS — J20.8 ACUTE BACTERIAL BRONCHITIS: ICD-10-CM

## 2021-03-23 PROBLEM — J96.00 ACUTE RESPIRATORY FAILURE DUE TO COVID-19 (HCC): Status: ACTIVE | Noted: 2021-03-23

## 2021-03-23 LAB
ALBUMIN SERPL BCP-MCNC: 2.8 G/DL (ref 3.5–5)
ALP SERPL-CCNC: 63 U/L (ref 46–116)
ALT SERPL W P-5'-P-CCNC: 43 U/L (ref 12–78)
AMORPH URATE CRY URNS QL MICRO: ABNORMAL /HPF
ANION GAP SERPL CALCULATED.3IONS-SCNC: 12 MMOL/L (ref 4–13)
APTT PPP: 32 SECONDS (ref 23–37)
AST SERPL W P-5'-P-CCNC: 58 U/L (ref 5–45)
BACTERIA UR QL AUTO: ABNORMAL /HPF
BASOPHILS # BLD AUTO: 0.01 THOUSANDS/ΜL (ref 0–0.1)
BASOPHILS NFR BLD AUTO: 0 % (ref 0–1)
BILIRUB SERPL-MCNC: 0.4 MG/DL (ref 0.2–1)
BILIRUB UR QL STRIP: ABNORMAL
BUN SERPL-MCNC: 15 MG/DL (ref 5–25)
CALCIUM ALBUM COR SERPL-MCNC: 9.7 MG/DL (ref 8.3–10.1)
CALCIUM SERPL-MCNC: 8.7 MG/DL (ref 8.3–10.1)
CHLORIDE SERPL-SCNC: 100 MMOL/L (ref 100–108)
CLARITY UR: CLEAR
CO2 SERPL-SCNC: 24 MMOL/L (ref 21–32)
COLOR UR: YELLOW
CREAT SERPL-MCNC: 1.02 MG/DL (ref 0.6–1.3)
CRP SERPL QL: 50.8 MG/L
D DIMER PPP FEU-MCNC: 0.63 UG/ML FEU
EOSINOPHIL # BLD AUTO: 0.01 THOUSAND/ΜL (ref 0–0.61)
EOSINOPHIL NFR BLD AUTO: 0 % (ref 0–6)
ERYTHROCYTE [DISTWIDTH] IN BLOOD BY AUTOMATED COUNT: 13.4 % (ref 11.6–15.1)
ERYTHROCYTE [SEDIMENTATION RATE] IN BLOOD: 61 MM/HOUR (ref 0–19)
GFR SERPL CREATININE-BSD FRML MDRD: 84 ML/MIN/1.73SQ M
GLUCOSE SERPL-MCNC: 116 MG/DL (ref 65–140)
GLUCOSE UR STRIP-MCNC: NEGATIVE MG/DL
HCT VFR BLD AUTO: 46.5 % (ref 36.5–49.3)
HGB BLD-MCNC: 15.1 G/DL (ref 12–17)
HGB UR QL STRIP.AUTO: ABNORMAL
IMM GRANULOCYTES # BLD AUTO: 0.06 THOUSAND/UL (ref 0–0.2)
IMM GRANULOCYTES NFR BLD AUTO: 1 % (ref 0–2)
INR PPP: 0.96 (ref 0.84–1.19)
KETONES UR STRIP-MCNC: NEGATIVE MG/DL
LACTATE SERPL-SCNC: 1 MMOL/L (ref 0.5–2)
LEUKOCYTE ESTERASE UR QL STRIP: NEGATIVE
LYMPHOCYTES # BLD AUTO: 1.29 THOUSANDS/ΜL (ref 0.6–4.47)
LYMPHOCYTES NFR BLD AUTO: 18 % (ref 14–44)
MCH RBC QN AUTO: 29.5 PG (ref 26.8–34.3)
MCHC RBC AUTO-ENTMCNC: 32.5 G/DL (ref 31.4–37.4)
MCV RBC AUTO: 91 FL (ref 82–98)
MONOCYTES # BLD AUTO: 0.83 THOUSAND/ΜL (ref 0.17–1.22)
MONOCYTES NFR BLD AUTO: 11 % (ref 4–12)
MUCOUS THREADS UR QL AUTO: ABNORMAL
NEUTROPHILS # BLD AUTO: 5.08 THOUSANDS/ΜL (ref 1.85–7.62)
NEUTS SEG NFR BLD AUTO: 70 % (ref 43–75)
NITRITE UR QL STRIP: NEGATIVE
NON-SQ EPI CELLS URNS QL MICRO: ABNORMAL /HPF
NRBC BLD AUTO-RTO: 0 /100 WBCS
PH UR STRIP.AUTO: 6 [PH]
PLATELET # BLD AUTO: 229 THOUSANDS/UL (ref 149–390)
PMV BLD AUTO: 11.6 FL (ref 8.9–12.7)
POTASSIUM SERPL-SCNC: 4 MMOL/L (ref 3.5–5.3)
PROT SERPL-MCNC: 7.4 G/DL (ref 6.4–8.2)
PROT UR STRIP-MCNC: >=300 MG/DL
PROTHROMBIN TIME: 12.6 SECONDS (ref 11.6–14.5)
RBC # BLD AUTO: 5.12 MILLION/UL (ref 3.88–5.62)
RBC #/AREA URNS AUTO: ABNORMAL /HPF
SODIUM SERPL-SCNC: 136 MMOL/L (ref 136–145)
SP GR UR STRIP.AUTO: 1.02 (ref 1–1.03)
TROPONIN I SERPL-MCNC: <0.02 NG/ML
UROBILINOGEN UR QL STRIP.AUTO: 0.2 E.U./DL
WBC # BLD AUTO: 7.28 THOUSAND/UL (ref 4.31–10.16)
WBC #/AREA URNS AUTO: ABNORMAL /HPF

## 2021-03-23 PROCEDURE — 93005 ELECTROCARDIOGRAM TRACING: CPT

## 2021-03-23 PROCEDURE — 86140 C-REACTIVE PROTEIN: CPT | Performed by: EMERGENCY MEDICINE

## 2021-03-23 PROCEDURE — 84484 ASSAY OF TROPONIN QUANT: CPT | Performed by: EMERGENCY MEDICINE

## 2021-03-23 PROCEDURE — 36415 COLL VENOUS BLD VENIPUNCTURE: CPT | Performed by: EMERGENCY MEDICINE

## 2021-03-23 PROCEDURE — 87040 BLOOD CULTURE FOR BACTERIA: CPT | Performed by: EMERGENCY MEDICINE

## 2021-03-23 PROCEDURE — 85610 PROTHROMBIN TIME: CPT | Performed by: EMERGENCY MEDICINE

## 2021-03-23 PROCEDURE — 99285 EMERGENCY DEPT VISIT HI MDM: CPT | Performed by: EMERGENCY MEDICINE

## 2021-03-23 PROCEDURE — 80053 COMPREHEN METABOLIC PANEL: CPT | Performed by: EMERGENCY MEDICINE

## 2021-03-23 PROCEDURE — 85025 COMPLETE CBC W/AUTO DIFF WBC: CPT | Performed by: EMERGENCY MEDICINE

## 2021-03-23 PROCEDURE — 83605 ASSAY OF LACTIC ACID: CPT | Performed by: EMERGENCY MEDICINE

## 2021-03-23 PROCEDURE — 87086 URINE CULTURE/COLONY COUNT: CPT | Performed by: EMERGENCY MEDICINE

## 2021-03-23 PROCEDURE — 85652 RBC SED RATE AUTOMATED: CPT | Performed by: EMERGENCY MEDICINE

## 2021-03-23 PROCEDURE — 85379 FIBRIN DEGRADATION QUANT: CPT | Performed by: EMERGENCY MEDICINE

## 2021-03-23 PROCEDURE — XW033E5 INTRODUCTION OF REMDESIVIR ANTI-INFECTIVE INTO PERIPHERAL VEIN, PERCUTANEOUS APPROACH, NEW TECHNOLOGY GROUP 5: ICD-10-PCS | Performed by: INTERNAL MEDICINE

## 2021-03-23 PROCEDURE — 99285 EMERGENCY DEPT VISIT HI MDM: CPT

## 2021-03-23 PROCEDURE — 71045 X-RAY EXAM CHEST 1 VIEW: CPT

## 2021-03-23 PROCEDURE — 81001 URINALYSIS AUTO W/SCOPE: CPT | Performed by: EMERGENCY MEDICINE

## 2021-03-23 PROCEDURE — 99223 1ST HOSP IP/OBS HIGH 75: CPT | Performed by: PHYSICIAN ASSISTANT

## 2021-03-23 PROCEDURE — 85730 THROMBOPLASTIN TIME PARTIAL: CPT | Performed by: EMERGENCY MEDICINE

## 2021-03-23 RX ORDER — CALCIUM CARBONATE 200(500)MG
1000 TABLET,CHEWABLE ORAL DAILY PRN
Status: DISCONTINUED | OUTPATIENT
Start: 2021-03-23 | End: 2021-03-25 | Stop reason: HOSPADM

## 2021-03-23 RX ORDER — ASPIRIN 81 MG/1
81 TABLET, CHEWABLE ORAL DAILY
Status: DISCONTINUED | OUTPATIENT
Start: 2021-03-24 | End: 2021-03-25 | Stop reason: HOSPADM

## 2021-03-23 RX ORDER — CEFTRIAXONE 1 G/50ML
1000 INJECTION, SOLUTION INTRAVENOUS EVERY 24 HOURS
Status: DISCONTINUED | OUTPATIENT
Start: 2021-03-24 | End: 2021-03-25 | Stop reason: HOSPADM

## 2021-03-23 RX ORDER — CARVEDILOL 25 MG/1
25 TABLET ORAL 2 TIMES DAILY WITH MEALS
Status: DISCONTINUED | OUTPATIENT
Start: 2021-03-24 | End: 2021-03-25 | Stop reason: HOSPADM

## 2021-03-23 RX ORDER — ALPRAZOLAM 0.5 MG/1
0.5 TABLET ORAL 2 TIMES DAILY PRN
Status: DISCONTINUED | OUTPATIENT
Start: 2021-03-23 | End: 2021-03-25 | Stop reason: HOSPADM

## 2021-03-23 RX ORDER — ATORVASTATIN CALCIUM 40 MG/1
40 TABLET, FILM COATED ORAL
Status: DISCONTINUED | OUTPATIENT
Start: 2021-03-24 | End: 2021-03-25 | Stop reason: HOSPADM

## 2021-03-23 RX ORDER — LANOLIN ALCOHOL/MO/W.PET/CERES
6 CREAM (GRAM) TOPICAL
Status: DISCONTINUED | OUTPATIENT
Start: 2021-03-23 | End: 2021-03-25 | Stop reason: HOSPADM

## 2021-03-23 RX ORDER — MELATONIN
2000 DAILY
Status: DISCONTINUED | OUTPATIENT
Start: 2021-03-24 | End: 2021-03-25 | Stop reason: HOSPADM

## 2021-03-23 RX ORDER — LISINOPRIL 10 MG/1
10 TABLET ORAL DAILY
Status: DISCONTINUED | OUTPATIENT
Start: 2021-03-24 | End: 2021-03-25 | Stop reason: HOSPADM

## 2021-03-23 RX ORDER — DEXAMETHASONE SODIUM PHOSPHATE 4 MG/ML
6 INJECTION, SOLUTION INTRA-ARTICULAR; INTRALESIONAL; INTRAMUSCULAR; INTRAVENOUS; SOFT TISSUE EVERY 24 HOURS
Status: DISCONTINUED | OUTPATIENT
Start: 2021-03-23 | End: 2021-03-25 | Stop reason: HOSPADM

## 2021-03-23 RX ORDER — SODIUM CHLORIDE 9 MG/ML
75 INJECTION, SOLUTION INTRAVENOUS ONCE
Status: DISCONTINUED | OUTPATIENT
Start: 2021-03-23 | End: 2021-03-23

## 2021-03-23 RX ORDER — ASCORBIC ACID 500 MG
1000 TABLET ORAL EVERY 12 HOURS SCHEDULED
Status: DISCONTINUED | OUTPATIENT
Start: 2021-03-23 | End: 2021-03-25 | Stop reason: HOSPADM

## 2021-03-23 RX ORDER — GUAIFENESIN/DEXTROMETHORPHAN 100-10MG/5
10 SYRUP ORAL EVERY 4 HOURS PRN
Status: DISCONTINUED | OUTPATIENT
Start: 2021-03-23 | End: 2021-03-25 | Stop reason: HOSPADM

## 2021-03-23 RX ORDER — DOXYCYCLINE HYCLATE 100 MG/1
100 CAPSULE ORAL EVERY 12 HOURS SCHEDULED
Status: DISCONTINUED | OUTPATIENT
Start: 2021-03-23 | End: 2021-03-25 | Stop reason: HOSPADM

## 2021-03-23 RX ORDER — ZINC SULFATE 50(220)MG
220 CAPSULE ORAL DAILY
Status: DISCONTINUED | OUTPATIENT
Start: 2021-03-24 | End: 2021-03-25 | Stop reason: HOSPADM

## 2021-03-23 RX ORDER — MULTIVITAMIN/IRON/FOLIC ACID 18MG-0.4MG
1 TABLET ORAL DAILY
Status: DISCONTINUED | OUTPATIENT
Start: 2021-03-31 | End: 2021-03-25 | Stop reason: HOSPADM

## 2021-03-23 RX ORDER — ACETAMINOPHEN 325 MG/1
650 TABLET ORAL EVERY 6 HOURS PRN
Status: DISCONTINUED | OUTPATIENT
Start: 2021-03-23 | End: 2021-03-25 | Stop reason: HOSPADM

## 2021-03-23 RX ORDER — FAMOTIDINE 20 MG/1
20 TABLET, FILM COATED ORAL 2 TIMES DAILY
Status: DISCONTINUED | OUTPATIENT
Start: 2021-03-24 | End: 2021-03-25 | Stop reason: HOSPADM

## 2021-03-23 RX ORDER — ONDANSETRON 2 MG/ML
4 INJECTION INTRAMUSCULAR; INTRAVENOUS EVERY 6 HOURS PRN
Status: DISCONTINUED | OUTPATIENT
Start: 2021-03-23 | End: 2021-03-25 | Stop reason: HOSPADM

## 2021-03-23 RX ADMIN — DOXYCYCLINE 100 MG: 100 CAPSULE ORAL at 23:42

## 2021-03-23 RX ADMIN — MELATONIN TAB 3 MG 6 MG: 3 TAB at 23:42

## 2021-03-23 RX ADMIN — OXYCODONE HYDROCHLORIDE AND ACETAMINOPHEN 1000 MG: 500 TABLET ORAL at 23:42

## 2021-03-23 RX ADMIN — DEXAMETHASONE SODIUM PHOSPHATE 6 MG: 4 INJECTION, SOLUTION INTRAMUSCULAR; INTRAVENOUS at 23:42

## 2021-03-23 RX ADMIN — ENOXAPARIN SODIUM 30 MG: 30 INJECTION SUBCUTANEOUS at 23:42

## 2021-03-23 NOTE — TELEPHONE ENCOUNTER
Pt called with worsening Sxs  Improved fever, but SOB  Reports desaturations to 60% lasting for about 10 min  With Hx of CHF, I encouraged pt to be evaluated and treated in the ED

## 2021-03-24 LAB
ALBUMIN SERPL BCP-MCNC: 2.7 G/DL (ref 3.5–5)
ALP SERPL-CCNC: 60 U/L (ref 46–116)
ALT SERPL W P-5'-P-CCNC: 41 U/L (ref 12–78)
ANION GAP SERPL CALCULATED.3IONS-SCNC: 9 MMOL/L (ref 4–13)
AST SERPL W P-5'-P-CCNC: 45 U/L (ref 5–45)
BASOPHILS # BLD AUTO: 0.01 THOUSANDS/ΜL (ref 0–0.1)
BASOPHILS NFR BLD AUTO: 0 % (ref 0–1)
BILIRUB SERPL-MCNC: 0.3 MG/DL (ref 0.2–1)
BUN SERPL-MCNC: 15 MG/DL (ref 5–25)
CALCIUM ALBUM COR SERPL-MCNC: 9.5 MG/DL (ref 8.3–10.1)
CALCIUM SERPL-MCNC: 8.5 MG/DL (ref 8.3–10.1)
CHLORIDE SERPL-SCNC: 102 MMOL/L (ref 100–108)
CO2 SERPL-SCNC: 24 MMOL/L (ref 21–32)
CREAT SERPL-MCNC: 1.13 MG/DL (ref 0.6–1.3)
CRP SERPL QL: 52.2 MG/L
D DIMER PPP FEU-MCNC: 0.66 UG/ML FEU
EOSINOPHIL # BLD AUTO: 0 THOUSAND/ΜL (ref 0–0.61)
EOSINOPHIL NFR BLD AUTO: 0 % (ref 0–6)
ERYTHROCYTE [DISTWIDTH] IN BLOOD BY AUTOMATED COUNT: 13.2 % (ref 11.6–15.1)
GFR SERPL CREATININE-BSD FRML MDRD: 74 ML/MIN/1.73SQ M
GLUCOSE SERPL-MCNC: 161 MG/DL (ref 65–140)
HCT VFR BLD AUTO: 44.9 % (ref 36.5–49.3)
HGB BLD-MCNC: 14.2 G/DL (ref 12–17)
IMM GRANULOCYTES # BLD AUTO: 0.05 THOUSAND/UL (ref 0–0.2)
IMM GRANULOCYTES NFR BLD AUTO: 1 % (ref 0–2)
LYMPHOCYTES # BLD AUTO: 0.62 THOUSANDS/ΜL (ref 0.6–4.47)
LYMPHOCYTES NFR BLD AUTO: 13 % (ref 14–44)
MCH RBC QN AUTO: 28.9 PG (ref 26.8–34.3)
MCHC RBC AUTO-ENTMCNC: 31.6 G/DL (ref 31.4–37.4)
MCV RBC AUTO: 91 FL (ref 82–98)
MONOCYTES # BLD AUTO: 0.23 THOUSAND/ΜL (ref 0.17–1.22)
MONOCYTES NFR BLD AUTO: 5 % (ref 4–12)
NEUTROPHILS # BLD AUTO: 4.02 THOUSANDS/ΜL (ref 1.85–7.62)
NEUTS SEG NFR BLD AUTO: 81 % (ref 43–75)
NRBC BLD AUTO-RTO: 0 /100 WBCS
PLATELET # BLD AUTO: 231 THOUSANDS/UL (ref 149–390)
PMV BLD AUTO: 10.9 FL (ref 8.9–12.7)
POTASSIUM SERPL-SCNC: 4.2 MMOL/L (ref 3.5–5.3)
PROCALCITONIN SERPL-MCNC: <0.05 NG/ML
PROT SERPL-MCNC: 7.2 G/DL (ref 6.4–8.2)
RBC # BLD AUTO: 4.92 MILLION/UL (ref 3.88–5.62)
SODIUM SERPL-SCNC: 135 MMOL/L (ref 136–145)
WBC # BLD AUTO: 4.93 THOUSAND/UL (ref 4.31–10.16)

## 2021-03-24 PROCEDURE — 85025 COMPLETE CBC W/AUTO DIFF WBC: CPT | Performed by: PHYSICIAN ASSISTANT

## 2021-03-24 PROCEDURE — 86140 C-REACTIVE PROTEIN: CPT | Performed by: PHYSICIAN ASSISTANT

## 2021-03-24 PROCEDURE — 80053 COMPREHEN METABOLIC PANEL: CPT | Performed by: PHYSICIAN ASSISTANT

## 2021-03-24 PROCEDURE — 85379 FIBRIN DEGRADATION QUANT: CPT | Performed by: PHYSICIAN ASSISTANT

## 2021-03-24 PROCEDURE — 84145 PROCALCITONIN (PCT): CPT | Performed by: PHYSICIAN ASSISTANT

## 2021-03-24 PROCEDURE — 99232 SBSQ HOSP IP/OBS MODERATE 35: CPT | Performed by: INTERNAL MEDICINE

## 2021-03-24 RX ORDER — BENZONATATE 100 MG/1
100 CAPSULE ORAL 3 TIMES DAILY
Status: DISCONTINUED | OUTPATIENT
Start: 2021-03-25 | End: 2021-03-25 | Stop reason: HOSPADM

## 2021-03-24 RX ORDER — ALBUTEROL SULFATE 90 UG/1
2 AEROSOL, METERED RESPIRATORY (INHALATION) EVERY 4 HOURS PRN
Status: DISCONTINUED | OUTPATIENT
Start: 2021-03-24 | End: 2021-03-25 | Stop reason: HOSPADM

## 2021-03-24 RX ADMIN — REMDESIVIR 200 MG: 100 INJECTION, POWDER, LYOPHILIZED, FOR SOLUTION INTRAVENOUS at 00:24

## 2021-03-24 RX ADMIN — ATORVASTATIN CALCIUM 40 MG: 40 TABLET, FILM COATED ORAL at 17:10

## 2021-03-24 RX ADMIN — ENOXAPARIN SODIUM 30 MG: 30 INJECTION SUBCUTANEOUS at 09:46

## 2021-03-24 RX ADMIN — DOXYCYCLINE 100 MG: 100 CAPSULE ORAL at 21:35

## 2021-03-24 RX ADMIN — OXYCODONE HYDROCHLORIDE AND ACETAMINOPHEN 1000 MG: 500 TABLET ORAL at 09:46

## 2021-03-24 RX ADMIN — DEXAMETHASONE SODIUM PHOSPHATE 6 MG: 4 INJECTION, SOLUTION INTRAMUSCULAR; INTRAVENOUS at 23:50

## 2021-03-24 RX ADMIN — ASPIRIN 81 MG CHEWABLE TABLET 81 MG: 81 TABLET CHEWABLE at 09:46

## 2021-03-24 RX ADMIN — ENOXAPARIN SODIUM 30 MG: 30 INJECTION SUBCUTANEOUS at 21:34

## 2021-03-24 RX ADMIN — Medication 2000 UNITS: at 09:46

## 2021-03-24 RX ADMIN — REMDESIVIR 100 MG: 100 INJECTION, POWDER, LYOPHILIZED, FOR SOLUTION INTRAVENOUS at 23:50

## 2021-03-24 RX ADMIN — ZINC SULFATE 220 MG (50 MG) CAPSULE 220 MG: CAPSULE at 09:46

## 2021-03-24 RX ADMIN — FAMOTIDINE 20 MG: 20 TABLET ORAL at 09:47

## 2021-03-24 RX ADMIN — DOXYCYCLINE 100 MG: 100 CAPSULE ORAL at 09:47

## 2021-03-24 RX ADMIN — CARVEDILOL 25 MG: 25 TABLET, FILM COATED ORAL at 09:47

## 2021-03-24 RX ADMIN — CEFTRIAXONE 1000 MG: 1 INJECTION, SOLUTION INTRAVENOUS at 01:25

## 2021-03-24 RX ADMIN — LISINOPRIL 10 MG: 10 TABLET ORAL at 09:46

## 2021-03-24 RX ADMIN — OXYCODONE HYDROCHLORIDE AND ACETAMINOPHEN 1000 MG: 500 TABLET ORAL at 21:34

## 2021-03-24 RX ADMIN — MELATONIN TAB 3 MG 6 MG: 3 TAB at 21:35

## 2021-03-24 RX ADMIN — GUAIFENESIN AND DEXTROMETHORPHAN 10 ML: 100; 10 SYRUP ORAL at 00:24

## 2021-03-24 RX ADMIN — CARVEDILOL 25 MG: 25 TABLET, FILM COATED ORAL at 17:10

## 2021-03-24 RX ADMIN — FAMOTIDINE 20 MG: 20 TABLET ORAL at 17:10

## 2021-03-24 RX ADMIN — GUAIFENESIN AND DEXTROMETHORPHAN 10 ML: 100; 10 SYRUP ORAL at 23:53

## 2021-03-24 NOTE — PLAN OF CARE
Problem: Potential for Falls  Goal: Patient will remain free of falls  Description: INTERVENTIONS:  - Assess patient frequently for physical needs  -  Identify cognitive and physical deficits and behaviors that affect risk of falls    -  [place] fall precautions as indicated by assessment   - Educate patient/family on patient safety including physical limitations  - Instruct patient to call for assistance with activity based on assessment  - Modify environment to reduce risk of injury  - Consider OT/PT consult to assist with strengthening/mobility  Outcome: Progressing     Problem: RESPIRATORY - ADULT  Goal: Achieves optimal ventilation and oxygenation  Description: INTERVENTIONS:  - Assess for changes in respiratory status  - Assess for changes in mentation and behavior  - Position to facilitate oxygenation and minimize respiratory effort  - Oxygen administered by appropriate delivery if ordered  - Initiate smoking cessation education as indicated  - Encourage broncho-pulmonary hygiene including cough, deep breathe, Incentive Spirometry  - Assess the need for suctioning and aspirate as needed  - Assess and instruct to report SOB or any respiratory difficulty  - Respiratory Therapy support as indicated  Outcome: Progressing     Problem: INFECTION - ADULT  Goal: Absence or prevention of progression during hospitalization  Description: INTERVENTIONS:  - Assess and monitor for signs and symptoms of infection  - Monitor lab/diagnostic results  - Monitor all insertion sites, i e  indwelling lines, tubes, and drains  - Monitor endotracheal if appropriate and nasal secretions for changes in amount and color  - [place] appropriate cooling/warming therapies per order  - Administer medications as ordered  - Instruct and encourage patient and family to use good hand hygiene technique  - Identify and instruct in appropriate isolation precautions for identified infection/condition  Outcome: Progressing

## 2021-03-24 NOTE — ASSESSMENT & PLAN NOTE
Patient presents with persistent cough, shortness of breath, hypoxia, diarrhea, generalized weakness  Diagnosed with COVID-19 3/17, symptoms started 3/10  Continue contact precautions for now given symptomatic nature   On 2 L, will titrate to maintain oxygen saturations greater than or equal to 90%  Mild COVID-19 treatment pathway  Daily labs, monitor inflammatory markers  Start IV antibiotics for possible superimposed bacterial pneumonia, discontinue if procalcitonin normal x2  Start remdesivir course  Vitamin cocktail x7 days  Decadron and Pepcid  Not a candidate for convalescent plasma since symptom onset greater than 7 days ago

## 2021-03-24 NOTE — UTILIZATION REVIEW
Continued Stay Review    Date: 3-23-21                          Current Patient Class: inpatient Current Level of Care: med surg    HPI:51 y o  male initially admitted on 3-23 for covid 19 pneumonia    Assessment/Plan:     Continue isolation, iv ceftriaxone daily abdullahi v remdesivir  Continue to monitor inflammatory markers- crp and d dimer slightly elevated  Now 94% on ra            Pertinent Labs/Diagnostic Results:       Results from last 7 days   Lab Units 03/17/21  1815   SARS-COV-2  Positive*     Results from last 7 days   Lab Units 03/24/21 0449 03/23/21 2106   WBC Thousand/uL 4 93 7 28   HEMOGLOBIN g/dL 14 2 15 1   HEMATOCRIT % 44 9 46 5   PLATELETS Thousands/uL 231 229   NEUTROS ABS Thousands/µL 4 02 5 08         Results from last 7 days   Lab Units 03/24/21 0449 03/23/21 2106   SODIUM mmol/L 135* 136   POTASSIUM mmol/L 4 2 4 0   CHLORIDE mmol/L 102 100   CO2 mmol/L 24 24   ANION GAP mmol/L 9 12   BUN mg/dL 15 15   CREATININE mg/dL 1 13 1 02   EGFR ml/min/1 73sq m 74 84   CALCIUM mg/dL 8 5 8 7     Results from last 7 days   Lab Units 03/24/21 0449 03/23/21  2106   AST U/L 45 58*   ALT U/L 41 43   ALK PHOS U/L 60 63   TOTAL PROTEIN g/dL 7 2 7 4   ALBUMIN g/dL 2 7* 2 8*   TOTAL BILIRUBIN mg/dL 0 30 0 40         Results from last 7 days   Lab Units 03/24/21  0449 03/23/21  2106   GLUCOSE RANDOM mg/dL 161* 116         Results from last 7 days   Lab Units 03/23/21 2106   TROPONIN I ng/mL <0 02     Results from last 7 days   Lab Units 03/24/21 0449 03/23/21 2210   D-DIMER QUANTITATIVE ug/ml FEU 0 66* 0 63*     Results from last 7 days   Lab Units 03/23/21  2210   PROTIME seconds 12 6   INR  0 96   PTT seconds 32         Results from last 7 days   Lab Units 03/24/21 0449   PROCALCITONIN ng/ml <0 05     Results from last 7 days   Lab Units 03/23/21  2106   LACTIC ACID mmol/L 1 0       Results from last 7 days   Lab Units 03/24/21 0449 03/23/21  2106   CRP mg/L 52 2* 50 8*   SED RATE mm/hour  --  61* Results from last 7 days   Lab Units 03/23/21  2211   CLARITY UA  Clear   COLOR UA  Yellow   SPEC GRAV UA  1 025   PH UA  6 0   GLUCOSE UA mg/dl Negative   KETONES UA mg/dl Negative   BLOOD UA  Moderate*   PROTEIN UA mg/dl >=300*   NITRITE UA  Negative   BILIRUBIN UA  Interference- unable to analyze*   UROBILINOGEN UA E U /dl 0 2   LEUKOCYTES UA  Negative   WBC UA /hpf 0-1   RBC UA /hpf None Seen   BACTERIA UA /hpf Occasional   EPITHELIAL CELLS WET PREP /hpf Occasional   MUCUS THREADS  Moderate*       Results from last 7 days   Lab Units 03/23/21  2106   BLOOD CULTURE  Received in Microbiology Lab  Culture in Progress  Received in Microbiology Lab  Culture in Progress         Vital Signs:     Date/  Time  Temp  Pulse  Resp  BP  MAP   SpO2   O2 Device    03/24/21 0745  99 1 °F (37 3 °C)  88  18  130/73  --  94 %   None (Room air)              Medications:     ascorbic acid, 1,000 mg, Oral, Q12H BRISEYDA  aspirin, 81 mg, Oral, Daily  atorvastatin, 40 mg, Oral, Daily With Dinner  carvedilol, 25 mg, Oral, BID With Meals  cefTRIAXone, 1,000 mg, Intravenous, Q24H  cholecalciferol, 2,000 Units, Oral, Daily  dexamethasone, 6 mg, Intravenous, Q24H  doxycycline hyclate, 100 mg, Oral, Q12H BRISEYDA  enoxaparin, 30 mg, Subcutaneous, Q12H BRISEYDA  famotidine, 20 mg, Oral, BID  influenza vaccine, 0 5 mL, Intramuscular, Once  lisinopril, 10 mg, Oral, Daily  melatonin, 6 mg, Oral, HS  zinc sulfate, 220 mg, Oral, Daily    Followed by  Anell Hallie ON 3/31/2021] multivitamin-minerals, 1 tablet, Oral, Daily  remdesivir, 100 mg, Intravenous, Q24H      Continuous IV Infusions:     PRN Meds:  acetaminophen, 650 mg, Oral, Q6H PRN  ALPRAZolam, 0 5 mg, Oral, BID PRN  calcium carbonate, 1,000 mg, Oral, Daily PRN  dextromethorphan-guaiFENesin, 10 mL, Oral, Q4H PRN  ondansetron, 4 mg, Intravenous, Q6H PRN        Discharge Plan: to be determined     Network Utilization Review Department  ATTENTION: Please call with any questions or concerns to [phone] and carefully listen to the prompts so that you are directed to the right person  All voicemails are confidential   Raven Burleson all requests for admission clinical reviews, approved or denied determinations and any other requests to dedicated fax number below belonging to the campus where the patient is receiving treatment   List of dedicated fax numbers for the Facilities:  [address] DENIALS (Administrative/Medical Necessity) [phone]   [address] (Maternity/NICU/Pediatrics) [phone]   [address] Mihir Benitez 5006 (  Zora Clayton "[place]" 103) [address] [place] [address] [address] [place] [address] [phone]

## 2021-03-24 NOTE — ASSESSMENT & PLAN NOTE
Follows outpatient cardiology for nonischemic cardiomyopathy, EF 25-30% initially now improved to 55%  Suspected due to uncontrolled hypertension in the setting of left bundle branch block and alcohol abuse, prior cardiac catheterization no evidence of obstructive CAD  Continue aspirin, statin, beta-blocker

## 2021-03-24 NOTE — ED PROVIDER NOTES
History  Chief Complaint   Patient presents with    Shortness of Breath     pt COVID +  Pt stating increasing SOB since March 10, diagnosed covid march 18th  c/o NVD  50-year-old male presents to the ED states that he was tested COVID positive on March 18th  States he had been short of breath before that has had increased fevers at home to 103 and severe shortness of breath  States over this weekend he started getting worse again with his shortness of breath  Presents tachypneic with O2 sat of 93 on room air  Patient increased to 95 when placed on oxygen patient has multiple medical problems  History provided by:  Patient   used: No        Prior to Admission Medications   Prescriptions Last Dose Informant Patient Reported? Taking?    Omeprazole-Sodium Bicarbonate (ZEGERID OTC PO)  Self Yes No   Sig: Take by mouth daily   acetaminophen (TYLENOL) 325 mg tablet  Self Yes No   Sig: Take 650 mg by mouth every 6 (six) hours as needed for mild pain or fever   albuterol (ProAir HFA) 90 mcg/act inhaler  Self No No   Sig: Inhale 2 puffs every 4 (four) hours as needed for wheezing or shortness of breath   Patient not taking: Reported on 3/17/2021   aspirin 81 mg chewable tablet  Self No No   Sig: Chew 1 tablet (81 mg total) daily   atorvastatin (LIPITOR) 40 mg tablet  Self No No   Sig: Take 1 tablet (40 mg total) by mouth daily with dinner   carvedilol (COREG) 25 mg tablet   No No   Sig: Take 1 tablet (25 mg total) by mouth 2 (two) times a day with meals   furosemide (LASIX) 20 mg tablet  Self No No   Sig: Take 1 tablet (20 mg total) by mouth daily   lisinopril (ZESTRIL) 20 mg tablet  Self No No   Sig: Take 1 tablet (20 mg total) by mouth daily   ondansetron (ZOFRAN) 4 mg tablet  Self No No   Sig: Take 1 tablet (4 mg total) by mouth every 8 (eight) hours as needed for nausea or vomiting   Patient not taking: Reported on 2/11/2021   polyethylene glycol (GOLYTELY) 4000 mL solution   No No   Sig: Take 4,000 mL by mouth once for 1 dose   spironolactone (ALDACTONE) 25 mg tablet  Self No No   Sig: Take 0 5 tablets (12 5 mg total) by mouth daily   umeclidinium bromide (INCRUSE ELLIPTA) 62 5 mcg/inh AEPB inhaler  Self No No   Sig: Inhale 1 puff daily   Patient not taking: Reported on 2021      Facility-Administered Medications: None       Past Medical History:   Diagnosis Date    Acute hypoxemic respiratory failure (HCC)     Cardiomyopathy ([facility] [address] )     Centrilobular emphysema ([facility] [address] ) 2020    CHF (congestive heart failure) (HCC)     55% no pacer needed    Combined systolic and diastolic cardiac dysfunction     Coronary artery disease     Hypertension     Mixed sleep apnea     Secondary pulmonary arterial hypertension (HCC)     Volume overload        Past Surgical History:   Procedure Laterality Date    CHOLECYSTECTOMY      FRACTURE SURGERY      jaw    MANDIBLE SURGERY      TONSILLECTOMY         Family History   Problem Relation Age of Onset    Heart disease Father     Cancer Father     Cancer Mother     Colon cancer Mother     No Known Problems Sister      I have reviewed and agree with the history as documented  E-Cigarette/Vaping    E-Cigarette Use Never User      E-Cigarette/Vaping Substances    Nicotine No     THC No     CBD No     Flavoring No     Other No     Unknown No      Social History     Tobacco Use    Smoking status: Former Smoker     Packs/day: 1 00     Years: 25 00     Pack years: 25 00     Types: Cigarettes     Quit date: 2014     Years since quittin 8    Smokeless tobacco: Never Used   Substance Use Topics    Alcohol use:  Yes     Alcohol/week: 2 0 standard drinks     Types: 2 Cans of beer per week     Frequency: 2-4 times a month     Drinks per session: 1 or 2     Binge frequency: Monthly     Comment: Last     Drug use: Yes     Types: Marijuana     Comment: pot cookie once in a while       Review of Systems   Constitutional: Negative for activity change, chills, diaphoresis and fever  HENT: Negative for congestion, ear pain, nosebleeds, sore throat, trouble swallowing and voice change  Eyes: Negative for pain, discharge and redness  Respiratory: Positive for cough and shortness of breath  Negative for apnea, choking, wheezing and stridor  Cardiovascular: Negative for chest pain and palpitations  Gastrointestinal: Negative for abdominal distention, abdominal pain, constipation, diarrhea, nausea and vomiting  Endocrine: Negative for polydipsia  Genitourinary: Negative for difficulty urinating, dysuria, flank pain, frequency, hematuria and urgency  Musculoskeletal: Negative for back pain, gait problem, joint swelling, myalgias, neck pain and neck stiffness  Skin: Negative for pallor and rash  Neurological: Negative for dizziness, tremors, syncope, speech difficulty, weakness, numbness and headaches  Hematological: Negative for adenopathy  Psychiatric/Behavioral: Negative for confusion, hallucinations, self-injury and suicidal ideas  The patient is not nervous/anxious  Physical Exam  Physical Exam  Vitals signs and nursing note reviewed  Constitutional:       General: He is not in acute distress  Appearance: He is well-developed  He is not diaphoretic  HENT:      Head: Normocephalic and atraumatic  Right Ear: External ear normal       Left Ear: External ear normal       Nose: Nose normal    Eyes:      Conjunctiva/sclera: Conjunctivae normal       Pupils: Pupils are equal, round, and reactive to light  Neck:      Musculoskeletal: Normal range of motion and neck supple  Cardiovascular:      Rate and Rhythm: Normal rate and regular rhythm  Heart sounds: Normal heart sounds  Pulmonary:      Effort: Tachypnea and respiratory distress present  Breath sounds: Normal breath sounds  Abdominal:      General: Bowel sounds are normal       Palpations: Abdomen is soft     Musculoskeletal: Normal range of motion  Skin:     General: Skin is warm and dry  Neurological:      Mental Status: He is alert and oriented to person, place, and time  Deep Tendon Reflexes: Reflexes are normal and symmetric  Vital Signs  ED Triage Vitals   Temperature Pulse Respirations Blood Pressure SpO2   03/23/21 1931 03/23/21 1930 03/23/21 1931 03/23/21 1930 03/23/21 1930   97 6 °F (36 4 °C) 96 (!) 24 162/92 94 %      Temp Source Heart Rate Source Patient Position - Orthostatic VS BP Location FiO2 (%)   03/23/21 1931 03/23/21 1931 03/23/21 1931 03/23/21 1931 --   Tympanic Monitor Sitting Right arm       Pain Score       --                  Vitals:    03/23/21 1931 03/23/21 1945 03/23/21 2000 03/23/21 2015   BP: 162/92 135/79 (!) 176/97 156/79   Pulse: 103 96 88 100   Patient Position - Orthostatic VS: Sitting            Visual Acuity  Visual Acuity      Most Recent Value   L Pupil Size (mm)  4 [pt stating eyelid flipped up two days ago now sclera red   Pt stating pupil larger at baseline since tore sphincter muscle when age 23, diplopia in that eye]   R Pupil Size (mm)  2   L Pupil Shape  Round   R Pupil Shape  Round          ED Medications  Medications - No data to display    Diagnostic Studies  Results Reviewed     Procedure Component Value Units Date/Time    C-reactive protein [[id number]]     Lab Status: No result Specimen: Blood     Sedimentation rate, automated [[id number]]     Lab Status: No result Specimen: Blood     Troponin I [[id number]]     Lab Status: No result Specimen: Blood     D-dimer, quantitative [[id number]]     Lab Status: No result Specimen: Blood     CBC and differential [[id number]]     Lab Status: No result Specimen: Blood     Protime-INR [[id number]]     Lab Status: No result Specimen: Blood     APTT [[id number]]     Lab Status: No result Specimen: Blood     Blood culture #1 [[id number]]     Lab Status: No result Specimen: Blood from Arm, Left     Blood culture #2 [[id number]]     Lab Status: No result Specimen: Blood from Arm, Left     Comprehensive metabolic panel [[id number]]     Lab Status: No result Specimen: Blood     Lactic acid [[id number]]     Lab Status: No result Specimen: Blood     UA (URINE) with reflex to Scope [[id number]]     Lab Status: No result Specimen: Urine     Urine culture [[id number]]     Lab Status: No result Specimen: Urine, Clean Catch                  XR chest 1 view portable   Final Result by Kassy Dan MD (03/23 2044)      Bilateral infiltrates in keeping with Covid pneumonia                  Workstation performed: [id number]                    Procedures  Procedures         ED Course                                           MDM    Disposition  Final diagnoses:   COVID-19     Time reflects when diagnosis was documented in both MDM as applicable and the Disposition within this note     Time User Action Codes Description Comment    3/23/2021  8:47 PM Shruthi Escobar Add [U07 1] COVID-19       ED Disposition     ED Disposition Condition Date/Time Comment    Admit Stable Tue Mar 23, 2021  8:47 PM Case was discussed with Geovanna Parker and the patient's admission status was agreed to be Admission Status: inpatient status to the service of Dr Mitchell Quintero   Follow-up Information    None         Patient's Medications   Discharge Prescriptions    No medications on file     No discharge procedures on file      PDMP Review     None          ED Provider  Electronically Signed by           Nico Saldivar DO  03/23/21 2052

## 2021-03-24 NOTE — PROGRESS NOTES
[address]    NAME: Xiomara Tillman is a 45 y o  male  : 1970    MRN: [id number]  DATE: 2021  TIME: 10:42 AM    Assessment and Plan   Fever, unspecified fever cause [R50 9]  1  Fever, unspecified fever cause  Novel Coronavirus (Covid-19),PCR [facility] - Office Collection   2  Cough  Novel Coronavirus (Covid-19),PCR [facility] [place] - [facility]         Patient Instructions     Advise patient due to his comorbid conditions and borderline oxygenation I recommend a chest x-ray at the least and possibly evaluation in the he ED  Patient states he needs to get home the person that drove him here has cancer and cannot wait in the car any longer  He signed AMA declining the chest x-ray and a referral to ED  Advise patient of risks due to his comorbid conditions and possibility of COVID  Advised patient if any further signs of respiratory distress to have go to the ER, patient expressed understanding of risks  Follow up with PCP in 3-5 days  Proceed to  ER if symptoms worsen  Chief Complaint     Chief Complaint   Patient presents with    COVID-19     PATIENT STATED THAT HE STARTED HAVING SYMPTOMS ON 03-15-21 COUGHING,FEVER AND SHORTNESS OF BREATH  HE TOOK MOTRIN  FOR FEVER LAST DOSE AT 9AM FEVER  9 AT 3PM          History of Present Illness       Luis Angel Crawford  Is a 53-year-old male who presents to clinic complaining of cough, fever, and shortness of breath x2 days  He describes the cough is dry and nonproductive  He states that his T-max was 102 9° F yesterday  He is also complaining of myalgias, fatigue, and dyspnea on exertion  He denies a chills, sore throat, ear pain, nasal congestion, sinus pain, chest pain, nausea, vomiting, diarrhea, recent travel, loss of taste or smell, or exposure to anyone known COVID positive  Review of Systems   Review of Systems   Constitutional: Positive for fatigue and fever  Negative for chills and diaphoresis     HENT: Negative for congestion, ear pain, sinus pressure, sinus pain and sore throat  Respiratory: Positive for cough and shortness of breath  Negative for chest tightness  Cardiovascular: Negative for chest pain  Gastrointestinal: Negative for diarrhea, nausea and vomiting  Musculoskeletal: Positive for myalgias  Neurological: Negative for dizziness, light-headedness and headaches  Current Medications     No current facility-administered medications for this visit  No current outpatient medications on file      Facility-Administered Medications Ordered in Other Visits:     acetaminophen (TYLENOL) tablet 650 mg, 650 mg, Oral, Q6H PRN, Edouard Hanson PA-C    ALPRAZolam (XANAX) tablet 0 5 mg, 0 5 mg, Oral, BID PRN, Edouard Hanson PA-C    ascorbic acid (VITAMIN C) tablet 1,000 mg, 1,000 mg, Oral, Q12H BRISEYDA, Edouard Hanson PA-C, 1,000 mg at 03/24/21 0946    aspirin chewable tablet 81 mg, 81 mg, Oral, Daily, Edouard Hanson PA-C, 81 mg at 03/24/21 0946    atorvastatin (LIPITOR) tablet 40 mg, 40 mg, Oral, Daily With Dinner, Edouard Hanson PA-C    calcium carbonate (TUMS) chewable tablet 1,000 mg, 1,000 mg, Oral, Daily PRN, Edouard Hanson PA-C    carvedilol (COREG) tablet 25 mg, 25 mg, Oral, BID With Meals, Edouard Hanson PA-C, 25 mg at 03/24/21 0947    cefTRIAXone (ROCEPHIN) IVPB (premix in dextrose) 1,000 mg 50 mL, 1,000 mg, Intravenous, Q24H, Edouard Hanson PA-C, Last Rate: 100 mL/hr at 03/24/21 0125, 1,000 mg at 03/24/21 0125    cholecalciferol (VITAMIN D3) tablet 2,000 Units, 2,000 Units, Oral, Daily, Edouard Hanson PA-C, 2,000 Units at 03/24/21 0946    dexamethasone (DECADRON) injection 6 mg, 6 mg, Intravenous, Q24H, Edouard Hanson PA-C, 6 mg at 03/23/21 2342    dextromethorphan-guaiFENesin (ROBITUSSIN DM) oral syrup 10 mL, 10 mL, Oral, Q4H PRN, Edouard Hanson PA-C, 10 mL at 03/24/21 0024    doxycycline hyclate (VIBRAMYCIN) capsule 100 mg, 100 mg, Oral, Q12H [facility], Edouard Hanson PA-C, 100 mg at 03/24/21 0947    enoxaparin (LOVENOX) subcutaneous injection 30 mg, 30 mg, Subcutaneous, Q12H [facility], Edouard Hanson PA-C, 30 mg at 03/24/21 0946    famotidine (PEPCID) tablet 20 mg, 20 mg, Oral, BID, Edouard Hanosn PA-C, 20 mg at 03/24/21 6910    influenza vaccine, recombinant, quadrivalent (FLUBLOK) IM injection 0 5 mL, 0 5 mL, Intramuscular, Once, Donita Vogt DO    lisinopril (ZESTRIL) tablet 10 mg, 10 mg, Oral, Daily, Edouard Hanson PA-C, 10 mg at 03/24/21 0946    melatonin tablet 6 mg, 6 mg, Oral, HS, Edouard Hanson PA-C, 6 mg at 03/23/21 2342    zinc sulfate (ZINCATE) capsule 220 mg, 220 mg, Oral, Daily, 220 mg at 03/24/21 0946 **FOLLOWED BY** [START ON 3/31/2021] multivitamin-minerals (CENTRUM ADULTS) tablet 1 tablet, 1 tablet, Oral, Daily, Edouard Hanson PA-C    ondansetron (ZOFRAN) injection 4 mg, 4 mg, Intravenous, Q6H PRN, Edouard Hanson PA-C    [COMPLETED] remdesivir (Veklury) 200 mg in sodium chloride 0 9 % 250 mL IVPB, 200 mg, Intravenous, Q24H, 200 mg at 03/24/21 0024 **FOLLOWED BY** remdesivir (Veklury) 100 mg in sodium chloride 0 9 % 250 mL IVPB, 100 mg, Intravenous, Q24H, Edouard Hanson PA-C    Current Allergies     Allergies as of 03/17/2021 - Reviewed 03/17/2021   Allergen Reaction Noted    Cheese  06/10/2020    Chocolate  04/02/2007    Nuts  04/02/2007            The following portions of the patient's history were reviewed and updated as appropriate: allergies, current medications, past family history, past medical history, past social history, past surgical history and problem list      Past Medical History:   Diagnosis Date    Acute [facility]emic respiratory failure [facility] [facility] )     Cardiomyopathy ([facility] [facility] )     Centrilobular emphysema ([facility] ) 5/20/2020    CHF (congestive heart failure) (HCC)     55% no pacer needed    Combined systolic and diastolic cardiac dysfunction     Coronary artery disease     Hypertension     Mixed sleep apnea     Secondary pulmonary arterial hypertension (HCC)     Volume overload        Past Surgical History:   Procedure Laterality Date    CHOLECYSTECTOMY      FRACTURE SURGERY      jaw    MANDIBLE SURGERY      TONSILLECTOMY         Family History   Problem Relation Age of Onset    Heart disease Father     Cancer Father     Cancer Mother     Colon cancer Mother     No Known Problems Sister          Medications have been verified  Objective   BP (!) 174/90   Pulse (!) 114   Temp 99 6 °F (37 6 °C)   Resp (!) 24   SpO2 96%   No LMP for male patient  Physical Exam     Physical Exam  Vitals signs and nursing note reviewed  Constitutional:       General: He is not in acute distress  Appearance: Normal appearance  He is ill-appearing  He is not toxic-appearing or diaphoretic  Comments: Patient appears out of breath and agitated sitting on the table  Pulse is 90% after ambulation and 96-98% after rest    Cardiovascular:      Rate and Rhythm: Regular rhythm  Tachycardia present  Heart sounds: Normal heart sounds  Pulmonary:      Effort: Pulmonary effort is normal  No respiratory distress  Breath sounds: Normal breath sounds  No stridor  No wheezing, rhonchi or rales  Neurological:      Mental Status: He is alert and oriented to person, place, and time     Psychiatric:         Mood and Affect: Mood normal          Behavior: Behavior normal

## 2021-03-24 NOTE — UTILIZATION REVIEW
Initial Clinical Review    Admission: Date/Time/Statement:   Admission Orders (From admission, onward)     Ordered        03/23/21 2049  Inpatient Admission  Once                   Orders Placed This Encounter   Procedures    Inpatient Admission     Standing Status:   Standing     Number of Occurrences:   1     Order Specific Question:   Level of Care     Answer:   Med Surg [16]     Order Specific Question:   Estimated length of stay     Answer:   More than 2 Midnights     Order Specific Question:   Certification     Answer:   I certify that inpatient services are medically necessary for this patient for a duration of greater than two midnights  See H&P and MD Progress Notes for additional information about the patient's course of treatment  ED Arrival Information     Expected Arrival Acuity Means of Arrival Escorted By Service Admission Type    - 3/23/2021 19:13 Urgent Walk-In Self Hospitalist Urgent    Arrival Complaint    Shortness of breath        Chief Complaint   Patient presents with    Shortness of Breath     pt COVID +  Pt stating increasing SOB since March 10, diagnosed covid march 18th  c/o NVD  Assessment/Plan:      45year old male presents to ed from home for increasing shortness of breath since he was diagnosed covid + on 3-10  Clinical assessment significant for heart rate  O2 sat 93%, resp 22-28 sustained  D dimer 0 63, crp 50 8, sed 61  Chest x ray with bilateral infiltrates consistent with covid pneumonia  PMHX: CHF,CAD, HTN, Emphysema  Treated in ed with 2L O2nc  Admit to inpatient medical surgical for covid 19 pneumonia, acute respiratory failure  Plan includes : isolation,  Supplemental oxygen, iv dexamethasone, doxycycline, sq lovenox, iv fluids 75/hr, iv remdesivir            ED Triage Vitals   03/23/21 1931 03/23/21 1930 03/23/21 1931 03/23/21 1930 03/23/21 1930   97 6 °F (36 4 °C) 96 (!) 24 162/92 94 %      Tympanic Monitor         No Pain          03/24/21 117 kg (257 lb 0 9 oz)     Additional Vital Signs:       Date/Time  Temp  Pulse  Resp  BP  MAP (mmHg)  SpO2  Calculated FIO2 (%) - Nasal Cannula  Nasal Cannula O2 Flow Rate (L/min)  O2 Device    03/23/21 2248  99 3 °F (37 4 °C)  95  20  127/83  --  95 %  --  --  None (Room air)    03/23/21 2145  --  98  28Abnormal   --  --  94 %  --  --  --    03/23/21 2115  --  96  28Abnormal   --  --  97 %  --  --  --    03/23/21 2100  --  98  28Abnormal   140/82  105  97 %  28  2 L/min  Nasal cannula    03/23/21 2045  --  --  26Abnormal   183/74  Abnormal   106  --  --  --  --    03/23/21 2030  --  100  28Abnormal   154/72  103  97 %  --  --  --    03/23/21 2015  --  100  22  156/79  106  97 %  --  --  --    03/23/21 2000  --  88  22  176/97  Abnormal   127  95 %  --  --  --    03/23/21 1945  --  96  22  135/79  104  95 %  --  --  --    03/23/21 1931  97 6 °F (36 4 °C)  103  24Abnormal   162/92  --  93 %  --  --  None (Room air)            Pertinent Labs/Diagnostic Test Results:     XR chest 1 view portable   (03/23 2044)      Bilateral infiltrates in keeping with Covid pneumonia           Results from last 7 days   Lab Units 03/17/21 1815   SARS-COV-2  Positive*     Results from last 7 days   Lab Units 03/23/21 2106   WBC Thousand/uL 7 28   HEMOGLOBIN g/dL 15 1   HEMATOCRIT % 46 5   PLATELETS Thousands/uL 229   NEUTROS ABS Thousands/µL 5 08         Results from last 7 days   Lab Units 03/23/21 2106   SODIUM mmol/L 136   POTASSIUM mmol/L 4 0   CHLORIDE mmol/L 100   CO2 mmol/L 24   ANION GAP mmol/L 12   BUN mg/dL 15   CREATININE mg/dL 1 02   EGFR ml/min/1 73sq m 84   CALCIUM mg/dL 8 7     Results from last 7 days   Lab Units 03/23/21 2106   AST U/L 58*   ALT U/L 43   ALK PHOS U/L 63   TOTAL PROTEIN g/dL 7 4   ALBUMIN g/dL 2 8*   TOTAL BILIRUBIN mg/dL 0 40         Results from last 7 days   Lab Units 03/23/21  2106   GLUCOSE RANDOM mg/dL 116       Results from last 7 days   Lab Units 03/23/21 2106   TROPONIN I ng/mL <0 02     Results from last 7 days   Lab Units 03/23/21 2210   D-DIMER QUANTITATIVE ug/ml FEU 0 63*     Results from last 7 days   Lab Units 03/23/21 2210   PROTIME seconds 12 6   INR  0 96   PTT seconds 32         Results from last 7 days   Lab Units 03/24/21  0449   PROCALCITONIN ng/ml <0 05     Results from last 7 days   Lab Units 03/23/21  2106   LACTIC ACID mmol/L 1 0     Results from last 7 days   Lab Units 03/24/21 0449 03/23/21  2106   CRP mg/L 52 2* 50 8*   SED RATE mm/hour  --  61*         Results from last 7 days   Lab Units 03/23/21 2211   CLARITY UA  Clear   COLOR UA  Yellow   SPEC GRAV UA  1 025   PH UA  6 0   GLUCOSE UA mg/dl Negative   KETONES UA mg/dl Negative   BLOOD UA  Moderate*   PROTEIN UA mg/dl >=300*   NITRITE UA  Negative   BILIRUBIN UA  Interference- unable to analyze*   UROBILINOGEN UA E U /dl 0 2   LEUKOCYTES UA  Negative   WBC UA /hpf 0-1   RBC UA /hpf None Seen   BACTERIA UA /hpf Occasional   EPITHELIAL CELLS WET PREP /hpf Occasional   MUCUS THREADS  Moderate*       Results from last 7 days   Lab Units 03/23/21 2106   BLOOD CULTURE  Received in Microbiology Lab  Culture in Progress  Received in Microbiology Lab  Culture in Progress       ED Treatment:   Medication Administration from 03/23/2021 1912 to 03/23/2021 2246     None        Past Medical History:   Diagnosis Date    Acute hypoxemic respiratory failure (HCC)     Cardiomyopathy (HCC)     Centrilobular emphysema ([address] ) 5/20/2020    CHF (congestive heart failure) (HCC)     55% no pacer needed    Combined systolic and diastolic cardiac dysfunction     Coronary artery disease     Hypertension     Mixed sleep apnea     Secondary pulmonary arterial hypertension (HCC)     Volume overload      Present on Admission:   Anxiety disorder   Benign essential hypertension   Cardiomyopathy ([address] )   Centrilobular emphysema ([address] )   Chronic combined systolic and diastolic congestive heart failure (HCC)   Esophageal reflux   PARKER (obstructive sleep apnea)   Secondary pulmonary arterial hypertension (HCC)      Admitting Diagnosis:     SOB (shortness of breath) [R06 02]  COVID-19 [U07 1]    Age/Sex: 45 y o  male     Admission Orders:    ascorbic acid, 1,000 mg, Oral, Q12H BRISEYDA  aspirin, 81 mg, Oral, Daily  atorvastatin, 40 mg, Oral, Daily With Dinner  carvedilol, 25 mg, Oral, BID With Meals  cefTRIAXone, 1,000 mg, Intravenous, Q24H  cholecalciferol, 2,000 Units, Oral, Daily  dexamethasone, 6 mg, Intravenous, Q24H  doxycycline hyclate, 100 mg, Oral, Q12H BRISEYDA  enoxaparin, 30 mg, Subcutaneous, Q12H BRISEYDA  famotidine, 20 mg, Oral, BID  influenza vaccine, 0 5 mL, Intramuscular, Once  lisinopril, 10 mg, Oral, Daily  melatonin, 6 mg, Oral, HS  zinc sulfate, 220 mg, Oral, Daily    Followed by  Mary Fong ON 3/31/2021] multivitamin-minerals, 1 tablet, Oral, Daily  remdesivir, 100 mg, Intravenous, Q24H      Continuous IV Infusions:     PRN Meds:  acetaminophen, 650 mg, Oral, Q6H PRN  ALPRAZolam, 0 5 mg, Oral, BID PRN  calcium carbonate, 1,000 mg, Oral, Daily PRN  dextromethorphan-guaiFENesin, 10 mL, Oral, Q4H PRN  ondansetron, 4 mg, Intravenous, Q6H PRN        None    Network Utilization Review Department  ATTENTION: Please call with any questions or concerns to [phone] and carefully listen to the prompts so that you are directed to the right person  All voicemails are confidential   Liana Omalley all requests for admission clinical reviews, approved or denied determinations and any other requests to dedicated fax number below belonging to the campus where the patient is receiving treatment   List of dedicated fax numbers for the Facilities:  [address] DENIALS (Administrative/Medical Necessity) [phone]   [address] (Maternity/NICU/Pediatrics) 261 [facility],[address] [place] [address] Emile Zhao 1212 [facility] [address] [address] [place] [address] Daniel Narayanan 1481 [phone]   [place] [address] [phone]

## 2021-03-24 NOTE — PLAN OF CARE
Problem: Potential for Falls  Goal: Patient will remain free of falls  Description: INTERVENTIONS:  - Assess patient frequently for physical needs  -  Identify cognitive and physical deficits and behaviors that affect risk of falls    -  Mathis fall precautions as indicated by assessment   - Educate patient/family on patient safety including physical limitations  - Instruct patient to call for assistance with activity based on assessment  - Modify environment to reduce risk of injury  - Consider OT/PT consult to assist with strengthening/mobility  Outcome: Progressing     Problem: RESPIRATORY - ADULT  Goal: Achieves optimal ventilation and oxygenation  Description: INTERVENTIONS:  - Assess for changes in respiratory status  - Assess for changes in mentation and behavior  - Position to facilitate oxygenation and minimize respiratory effort  - Oxygen administered by appropriate delivery if ordered  - Initiate smoking cessation education as indicated  - Encourage broncho-pulmonary hygiene including cough, deep breathe, Incentive Spirometry  - Assess the need for suctioning and aspirate as needed  - Assess and instruct to report SOB or any respiratory difficulty  - Respiratory Therapy support as indicated  Outcome: Progressing     Problem: INFECTION - ADULT  Goal: Absence or prevention of progression during hospitalization  Description: INTERVENTIONS:  - Assess and monitor for signs and symptoms of infection  - Monitor lab/diagnostic results  - Monitor all insertion sites, i e  indwelling lines, tubes, and drains  - Administer medications as ordered  - Instruct and encourage patient and family to use good hand hygiene technique  - Identify and instruct in appropriate isolation precautions for identified infection/condition  Outcome: Progressing

## 2021-03-24 NOTE — PROGRESS NOTES
03/24/21 [address] [place] [address] Drive   Patient Information   Mental Status Alert   Primary Caregiver Self   Accompanied by/Relationship SCREENED with MD  The Pt is alert and oriented X4  The Pt is also independent with all ADL and IADL needs  NO CM NEEDS IDENTIFIED AT THIS TIME     Activities of Daily Living Prior to Admission   Functional Status [address] [facility] [address] SSI/SSD

## 2021-03-24 NOTE — PROGRESS NOTES
[facility] Internal Medicine Progress Note  Patient: Willa Brown 45 y o  male   MRN: [id number]  PCP: Hallie Yoder MD  Unit/Bed#: [address] [place] [address] Encounter: [id number]  Date Of Visit: 03/24/21    Problem List:    Principal Problem:    Acute respiratory failure due to COVID-19 [facility])  Active Problems:    Benign essential hypertension    Cardiomyopathy ([facility] )    Chronic combined systolic and diastolic congestive heart failure ([facility] )    Secondary pulmonary arterial hypertension (HCC)    PARKER (obstructive sleep apnea)    Centrilobular emphysema ([facility])    Anxiety disorder    Esophageal reflux      Assessment & Plan:    * Acute respiratory failure due to COVID-19 [facility])  Assessment & Plan  Patient presents with persistent cough, shortness of breath, hypoxia, diarrhea, generalized weakness  Diagnosed with COVID-19 3/17, symptoms started 3/10  Continue contact precautions for now given symptomatic nature   Clinically improving  On 2 L, will titrate to maintain oxygen saturations greater than or equal to 90%  Mild COVID-19 treatment pathway  Daily labs, monitor inflammatory markers  Continue IV antibiotics for possible superimposed bacterial pneumonia, discontinue if procalcitonin normal x2  Continue remdesivir course  Vitamin cocktail x7 days  Decadron and Pepcid  Not a candidate for convalescent plasma since symptom onset greater than 7 days ago  Taper oxygen as tolerated, educated about incentive spirometry and activity as tolerated  Educated about self proning    Centrilobular emphysema ([facility] )  Assessment & Plan  Respiratory protocol  Not taking inhalers at home  Will resume Incruse on discharge, albuterol as needed  Pulmonary follow-up discharge    PARKER (obstructive sleep apnea)  Assessment & Plan  Noncompliant with CPAP, patient was counseled  Monitor clinically  Will monitor oxygenation q h s        Chronic combined systolic and diastolic congestive heart failure (HCC)  Assessment & Plan  Wt Readings from Last 3 Encounters:   21 117 kg (257 lb 0 9 oz)   21 119 kg (262 lb)   10/01/20 109 kg (240 lb)             Cardiomyopathy ([address] )  Assessment & Plan  Follows outpatient cardiology for nonischemic cardiomyopathy, EF 25-30% initially now improved to 55%  Suspected due to uncontrolled hypertension in the setting of left bundle branch block and alcohol abuse, prior cardiac catheterization no evidence of obstructive CAD  Continue aspirin, statin, beta-blocker    Benign essential hypertension  Assessment & Plan  Continue Coreg and lisinopril    Anxiety disorder  Assessment & Plan  Currently anxious about medical status on presentation  Overall feeling better today with improvement in symptoms    Esophageal reflux  Assessment & Plan  Continue famotidine while on Decadron        VTE Pharmacologic Prophylaxis:   Pharmacologic: Enoxaparin (Lovenox)  Mechanical VTE Prophylaxis in Place: Yes    Patient Centered Rounds: I have performed bedside rounds with nursing staff today  Discussions with Specialists or Other Care Team Provider:  Yes    Education and Discussions with Family / Patient:  Discussed with patient    Time Spent for Care: 45 minutes  More than 50% of total time spent on counseling and coordination of care as described above  Current Length of Stay: 1 day(s)    Current Patient Status: Inpatient   Certification Statement: The patient will continue to require additional inpatient hospital stay due to Indu Sheridan, hypoxia    Discharge Plan:  Home when clinically improved    Code Status: Level 1 - Full Code      Subjective:   Reports improvement in shortness of breath and cough  Appetite is improving, still with lack of taste  Denies abdominal pain or diarrhea  Denies any chest pain  Denies any further fever or chills    Overall feeling better today    Objective:     Vitals:   Temp (24hrs), Av 5 °F (36 9 °C), Min:97 6 °F (36 4 °C), Max:99 3 °F (37 4 °C)    Temp:  [97 6 °F (36 4 °C)-99 3 °F (37 4 °C)] 97 8 °F (36 6 °C)  HR:  [] 78  Resp:  [18-28] 24  BP: (122-183)/(66-97) 122/66  SpO2:  [93 %-97 %] 95 %  Body mass index is 36 88 kg/m²  Input and Output Summary (last 24 hours):     No intake or output data in the 24 hours ending 03/24/21 4562    Physical Exam:     Physical Exam  Constitutional:       General: He is not in acute distress  Appearance: He is obese  HENT:      Head: Normocephalic and atraumatic  Neck:      Musculoskeletal: Neck supple  Cardiovascular:      Rate and Rhythm: Normal rate  Pulmonary:      Effort: Pulmonary effort is normal  No respiratory distress  Breath sounds: No wheezing, rhonchi or rales  Comments: Diminished bilaterally, dry cough on deep inspiration  Chest:      Chest wall: No tenderness  Abdominal:      General: Bowel sounds are normal  There is no distension  Palpations: Abdomen is soft  Tenderness: There is no abdominal tenderness  There is no guarding or rebound  Musculoskeletal:      Right lower leg: No edema  Left lower leg: No edema  Skin:     General: Skin is warm and dry  Findings: No rash  Neurological:      General: No focal deficit present  Mental Status: He is alert and oriented to person, place, and time  Mental status is at baseline  Cranial Nerves: No cranial nerve deficit  Additional Data:     Labs:    Results from last 7 days   Lab Units 03/24/21  0449   WBC Thousand/uL 4 93   HEMOGLOBIN g/dL 14 2   HEMATOCRIT % 44 9   PLATELETS Thousands/uL 231   NEUTROS PCT % 81*   LYMPHS PCT % 13*   MONOS PCT % 5   EOS PCT % 0     Results from last 7 days   Lab Units 03/24/21  0449   POTASSIUM mmol/L 4 2   CHLORIDE mmol/L 102   CO2 mmol/L 24   BUN mg/dL 15   CREATININE mg/dL 1 13   CALCIUM mg/dL 8 5   ALK PHOS U/L 60   ALT U/L 41   AST U/L 45     Results from last 7 days   Lab Units 03/23/21  2210   INR  0 96       * I Have Reviewed All Lab Data Listed Above  * Additional Pertinent Lab Tests Reviewed:  All Labs Within Last 24 Hours Reviewed      Imaging:  Imaging Reports Reviewed Today Include:  Chest x-ray    Recent Cultures (last 7 days):     Results from last 7 days   Lab Units 03/23/21 2106   BLOOD CULTURE  Received in Microbiology Lab  Culture in Progress  Received in Microbiology Lab  Culture in Progress         Last 24 Hours Medication List:   Current Facility-Administered Medications   Medication Dose Route Frequency Provider Last Rate    acetaminophen  650 mg Oral Q6H PRN Edouard Hanson PA-C      ALPRAZolam  0 5 mg Oral BID PRN Edouard Hanson PA-C      ascorbic acid  1,000 mg Oral Q12H [facility] Edouard Hanson PA-C      aspirin  81 mg Oral Daily Edouard Hanson PA-C      atorvastatin  40 mg Oral Daily With [employer] Margie PA-C      calcium carbonate  1,000 mg Oral Daily PRN Edouard Hanson PA-C      carvedilol  25 mg Oral BID With Meals Edouard Hanson PA-C      cefTRIAXone  1,000 mg Intravenous Q24H Edouard Hanson PA-C 1,000 mg (03/24/21 0125)    cholecalciferol  2,000 Units Oral Daily Edouard Hanson PA-C      dexamethasone  6 mg Intravenous Q24H Edouard Hanson PA-C      dextromethorphan-guaiFENesin  10 mL Oral Q4H PRN Edouard Hanson PA-C      doxycycline hyclate  100 mg Oral Q12H [facility] Edouard Hanson PA-C      enoxaparin  30 mg Subcutaneous Q12H [facility] Edouard Hanson PA-C      famotidine  20 mg Oral BID Edouard Hanson PA-C      influenza vaccine  0 5 mL Intramuscular Once Donita Vogt DO      lisinopril  10 mg Oral Daily Edouard Hanson PA-C      melatonin  6 mg Oral HS Edouard Hanson PA-C      zinc sulfate  220 mg Oral Daily Edouard Hanson PA-C      Followed by   Meka Falk ON 3/31/2021] multivitamin-minerals  1 tablet Oral Daily Edouard Hanson PA-C      ondansetron  4 mg Intravenous Q6H PRN Edouard Hanson PA-C      remdesivir  100 mg Intravenous Q24H Emely aHnson PA-C            Today, Patient Was Seen By: Colton Ferrell MD    ** Please Note: "This note has been constructed using a voice recognition system  Therefore there may be syntax, spelling, and/or grammatical errors   Please call if you have any questions  "**

## 2021-03-24 NOTE — H&P
[facility]  H&P- Frank Magaña 1970, 45 y o  male MRN: [id number]  Unit/Bed#: [address] [place] [address] Encounter: [id number]  Primary Care Provider: Blanche Cary MD   Date and time admitted to hospital: 3/23/2021  7:25 PM    * Acute respiratory failure due to COVID-19 [facility])  Assessment & Plan  Patient presents with persistent cough, shortness of breath, hypoxia, diarrhea, generalized weakness  Diagnosed with COVID-19 3/17, symptoms started 3/10  Continue contact precautions for now given symptomatic nature   On 2 L, will titrate to maintain oxygen saturations greater than or equal to 90%  Mild COVID-19 treatment pathway  Daily labs, monitor inflammatory markers  Start IV antibiotics for possible superimposed bacterial pneumonia, discontinue if procalcitonin normal x2  Start remdesivir course  Vitamin cocktail x7 days  Decadron and Pepcid  Not a candidate for convalescent plasma since symptom onset greater than 7 days ago    Centrilobular emphysema (HCC)  Assessment & Plan  Respiratory protocol  Not taking inhalers at home    PARKER (obstructive sleep apnea)  Assessment & Plan  Noncompliant with CPAP    Cardiomyopathy ([facility] )  Assessment & Plan  Follows outpatient cardiology for nonischemic cardiomyopathy, EF 25-30% initially now improved to 55%  Suspected due to uncontrolled hypertension in the setting of left bundle branch block and alcohol abuse, prior cardiac catheterization no evidence of obstructive CAD  Continue aspirin, statin, beta-blocker    Esophageal reflux  Assessment & Plan  Continue famotidine while on Decadron    Benign essential hypertension  Assessment & Plan  Continue Coreg and lisinopril    Anxiety disorder  Assessment & Plan  Currently anxious about medical status, will order p r n  Xanax    VTE Prophylaxis: Enoxaparin (Lovenox)  / sequential compression device   Code Status: full code   POLST: POLST form is not discussed and not completed at this time    Discussion with family: Anticipated Length of Stay:  Patient will be admitted on an Inpatient basis with an anticipated length of stay of  > 2 midnights  Justification for Hospital Stay: covid 19 pna and hypoxia     Total Time for Visit, including Counseling / Coordination of Care: 45 minutes  Greater than 50% of this total time spent on direct patient counseling and coordination of care  Chief Complaint:   Worsening ZAYAS     History of Present Illness:    David Centeno is a 45 y o  male with past medical history significant for CHF, CAD, hypertension, sleep apnea, pulmonary hypertension, who presents with worsening shortness of breath on exertion, diarrhea, generalized weakness  Patient was diagnosed with COVID-19 on 03/17 after he developed a cough on 03/10  States cough initially got better however has since worsened over last few days with dyspnea upon exertion  States diarrhea started today  He has lack of sense of taste or smell and is unable to eat anything due to poor appetite and generalized fatigue  Reports high fevers over last 3 days  He is noncompliant with CPAP  Compliant with medications however  Prior hx of heavy etoh and tobacco abuse  Review of Systems:    Review of Systems   Constitutional: Positive for appetite change, fatigue and fever  Negative for chills  HENT: Negative for congestion  Respiratory: Positive for cough and shortness of breath  Negative for wheezing  Cardiovascular: Negative for chest pain, palpitations and leg swelling  Gastrointestinal: Negative for abdominal pain  Genitourinary: Negative for dysuria  Musculoskeletal: Negative for back pain  Neurological: Positive for weakness  Negative for dizziness and light-headedness  Psychiatric/Behavioral: Negative for confusion         Past Medical and Surgical History:     Past Medical History:   Diagnosis Date    Acute hypoxemic respiratory failure ([facility] )     Cardiomyopathy ([facility] )     Centrilobular emphysema ([facility] ) 5/20/2020    CHF (congestive heart failure) (HCC)     55% no pacer needed    Combined systolic and diastolic cardiac dysfunction     Coronary artery disease     Hypertension     Mixed sleep apnea     Secondary pulmonary arterial hypertension (HCC)     Volume overload        Past Surgical History:   Procedure Laterality Date    CHOLECYSTECTOMY      FRACTURE SURGERY      jaw    MANDIBLE SURGERY      TONSILLECTOMY         Meds/Allergies:    Prior to Admission medications    Medication Sig Start Date End Date Taking?  Authorizing Provider   acetaminophen (TYLENOL) 325 mg tablet Take 650 mg by mouth every 6 (six) hours as needed for mild pain or fever    Historical Provider, MD   albuterol (ProAir HFA) 90 mcg/act inhaler Inhale 2 puffs every 4 (four) hours as needed for wheezing or shortness of breath  Patient not taking: Reported on 3/17/2021 3/18/20   Morris Cole PA-C   aspirin 81 mg chewable tablet Chew 1 tablet (81 mg total) daily 5/8/20   Donita Vogt DO   atorvastatin (LIPITOR) 40 mg tablet Take 1 tablet (40 mg total) by mouth daily with dinner 6/8/20   Hair Yusuf MD   carvedilol (COREG) 25 mg tablet Take 1 tablet (25 mg total) by mouth 2 (two) times a day with meals 2/17/21   Hair Yusuf MD   furosemide (LASIX) 20 mg tablet Take 1 tablet (20 mg total) by mouth daily 2/10/21   Hair Yusuf MD   lisinopril (ZESTRIL) 20 mg tablet Take 1 tablet (20 mg total) by mouth daily 6/10/20   Hair Yusuf MD   Omeprazole-Sodium Bicarbonate (ZEGERID OTC PO) Take by mouth daily    Historical Provider, MD   ondansetron (ZOFRAN) 4 mg tablet Take 1 tablet (4 mg total) by mouth every 8 (eight) hours as needed for nausea or vomiting  Patient not taking: Reported on 2/11/2021 9/18/20   Mechu Elveria Scheuermann, MD   polyethylene glycol (GOLYTELY) 4000 mL solution Take 4,000 mL by mouth once for 1 dose 9/18/20 9/18/20  Mechu Elveria Scheuermann, MD   spironolactone (ALDACTONE) 25 mg tablet Take 0 5 tablets (12 5 mg total) by mouth daily 20   Roland Reyes MD   umeclidinium bromide (INCRUSE ELLIPTA) 62 5 mcg/inh AEPB inhaler Inhale 1 puff daily  Patient not taking: Reported on 2021   BRISEIDA Siddiqui     I have reviewed home medications with patient personally  Allergies: Allergies   Allergen Reactions    Cheese      Fever, nasal congestion    Chocolate      Fever, nasal congestion    Nuts      Fever, nasal congestion       Social History:     Marital Status: Single   Occupation:   Patient Pre-hospital Living Situation: with SO   Patient Pre-hospital Level of Mobility: no limits   Patient Pre-hospital Diet Restrictions: none   Substance Use History:   Social History     Substance and Sexual Activity   Alcohol Use Yes    Alcohol/week: 2 0 standard drinks    Types: 2 Cans of beer per week    Frequency: 2-4 times a month    Drinks per session: 1 or 2    Binge frequency: Monthly    Comment: Last      Social History     Tobacco Use   Smoking Status Former Smoker    Packs/day: 1     Years: 25     Pack years: 25     Types: Cigarettes    Quit date: 2014    Years since quittin 8   Smokeless Tobacco Never Used     Social History     Substance and Sexual Activity   Drug Use Yes    Types: Marijuana    Comment: pot cookie once in a while       Family History:    Family History   Problem Relation Age of Onset    Heart disease Father     Cancer Father     Cancer Mother     Colon cancer Mother     No Known Problems Sister        Physical Exam:     Vitals:   Blood Pressure: 127/83 (21)  Pulse: 95 (21)  Temperature: 99 3 °F (37 4 °C) (21)  Temp Source: Oral (21)  Respirations: 20 (21)  Height: 5' 10" (177 8 cm) (21)  Weight - Scale: 116 kg (255 lb) (21)  SpO2: 95 % (21)    Physical Exam  Constitutional:       Appearance: Normal appearance  He is obese     HENT:      Head: Normocephalic and atraumatic  Mouth/Throat:      Mouth: Mucous membranes are dry  Eyes:      Extraocular Movements: Extraocular movements intact  Neck:      Musculoskeletal: Normal range of motion and neck supple  Cardiovascular:      Rate and Rhythm: Normal rate and regular rhythm  Pulmonary:      Effort: Pulmonary effort is normal       Comments: Decreased at bases; harsh dry cough noted; on 3L NC 97-98%  Abdominal:      General: Bowel sounds are normal       Palpations: Abdomen is soft  Musculoskeletal: Normal range of motion  General: No swelling  Skin:     General: Skin is warm and dry  Neurological:      General: No focal deficit present  Mental Status: He is alert  Psychiatric:         Mood and Affect: Mood normal          Behavior: Behavior normal       Comments: +anxious        Additional Data:     Lab Results: I have personally reviewed pertinent reports  Results from last 7 days   Lab Units 03/23/21  2106   WBC Thousand/uL 7 28   HEMOGLOBIN g/dL 15 1   HEMATOCRIT % 46 5   PLATELETS Thousands/uL 229   NEUTROS PCT % 70   LYMPHS PCT % 18   MONOS PCT % 11   EOS PCT % 0     Results from last 7 days   Lab Units 03/23/21  2106   SODIUM mmol/L 136   POTASSIUM mmol/L 4 0   CHLORIDE mmol/L 100   CO2 mmol/L 24   BUN mg/dL 15   CREATININE mg/dL 1 02   ANION GAP mmol/L 12   CALCIUM mg/dL 8 7   ALBUMIN g/dL 2 8*   TOTAL BILIRUBIN mg/dL 0 40   ALK PHOS U/L 63   ALT U/L 43   AST U/L 58*   GLUCOSE RANDOM mg/dL 116     Results from last 7 days   Lab Units 03/23/21  2210   INR  0 96             Results from last 7 days   Lab Units 03/23/21  2106   LACTIC ACID mmol/L 1 0       Imaging: I have personally reviewed pertinent reports        XR chest 1 view portable   Final Result by Estephanie Gleason MD (03/23 2044)      Bilateral infiltrates in keeping with Covid pneumonia                  Workstation performed: [id number]             EKG, Pathology, and Other Studies Reviewed on Admission:   · EKG:     Allscripts / Epic Records Reviewed: Yes     ** Please Note: This note has been constructed using a voice recognition system   **

## 2021-03-25 VITALS
TEMPERATURE: 98.2 F | SYSTOLIC BLOOD PRESSURE: 130 MMHG | HEIGHT: 70 IN | BODY MASS INDEX: 36.8 KG/M2 | WEIGHT: 257.06 LBS | OXYGEN SATURATION: 94 % | RESPIRATION RATE: 22 BRPM | DIASTOLIC BLOOD PRESSURE: 68 MMHG | HEART RATE: 75 BPM

## 2021-03-25 PROBLEM — J96.00 ACUTE RESPIRATORY FAILURE DUE TO COVID-19 (HCC): Status: RESOLVED | Noted: 2021-03-23 | Resolved: 2021-03-25

## 2021-03-25 PROBLEM — R26.2 AMBULATORY DYSFUNCTION: Status: ACTIVE | Noted: 2021-03-25

## 2021-03-25 PROBLEM — U07.1 ACUTE RESPIRATORY FAILURE DUE TO COVID-19 (HCC): Status: RESOLVED | Noted: 2021-03-23 | Resolved: 2021-03-25

## 2021-03-25 LAB
ALBUMIN SERPL BCP-MCNC: 2.6 G/DL (ref 3.5–5)
ALP SERPL-CCNC: 54 U/L (ref 46–116)
ALT SERPL W P-5'-P-CCNC: 41 U/L (ref 12–78)
ANION GAP SERPL CALCULATED.3IONS-SCNC: 12 MMOL/L (ref 4–13)
AST SERPL W P-5'-P-CCNC: 31 U/L (ref 5–45)
BACTERIA UR CULT: NORMAL
BILIRUB SERPL-MCNC: 0.3 MG/DL (ref 0.2–1)
BUN SERPL-MCNC: 26 MG/DL (ref 5–25)
CALCIUM ALBUM COR SERPL-MCNC: 9.6 MG/DL (ref 8.3–10.1)
CALCIUM SERPL-MCNC: 8.5 MG/DL (ref 8.3–10.1)
CHLORIDE SERPL-SCNC: 103 MMOL/L (ref 100–108)
CO2 SERPL-SCNC: 23 MMOL/L (ref 21–32)
CREAT SERPL-MCNC: 1.15 MG/DL (ref 0.6–1.3)
CRP SERPL QL: 21.8 MG/L
D DIMER PPP FEU-MCNC: 0.45 UG/ML FEU
ERYTHROCYTE [DISTWIDTH] IN BLOOD BY AUTOMATED COUNT: 13.2 % (ref 11.6–15.1)
FERRITIN SERPL-MCNC: 660 NG/ML (ref 8–388)
GFR SERPL CREATININE-BSD FRML MDRD: 73 ML/MIN/1.73SQ M
GLUCOSE SERPL-MCNC: 155 MG/DL (ref 65–140)
HCT VFR BLD AUTO: 44.9 % (ref 36.5–49.3)
HGB BLD-MCNC: 13.9 G/DL (ref 12–17)
MCH RBC QN AUTO: 28.7 PG (ref 26.8–34.3)
MCHC RBC AUTO-ENTMCNC: 31 G/DL (ref 31.4–37.4)
MCV RBC AUTO: 93 FL (ref 82–98)
PLATELET # BLD AUTO: 305 THOUSANDS/UL (ref 149–390)
PMV BLD AUTO: 10.9 FL (ref 8.9–12.7)
POTASSIUM SERPL-SCNC: 3.9 MMOL/L (ref 3.5–5.3)
PROCALCITONIN SERPL-MCNC: <0.05 NG/ML
PROT SERPL-MCNC: 7.1 G/DL (ref 6.4–8.2)
RBC # BLD AUTO: 4.85 MILLION/UL (ref 3.88–5.62)
SODIUM SERPL-SCNC: 138 MMOL/L (ref 136–145)
TROPONIN I SERPL-MCNC: <0.02 NG/ML
WBC # BLD AUTO: 6.99 THOUSAND/UL (ref 4.31–10.16)

## 2021-03-25 PROCEDURE — 99239 HOSP IP/OBS DSCHRG MGMT >30: CPT | Performed by: INTERNAL MEDICINE

## 2021-03-25 PROCEDURE — 84484 ASSAY OF TROPONIN QUANT: CPT | Performed by: INTERNAL MEDICINE

## 2021-03-25 PROCEDURE — 94761 N-INVAS EAR/PLS OXIMETRY MLT: CPT

## 2021-03-25 PROCEDURE — 85379 FIBRIN DEGRADATION QUANT: CPT | Performed by: INTERNAL MEDICINE

## 2021-03-25 PROCEDURE — 85027 COMPLETE CBC AUTOMATED: CPT | Performed by: INTERNAL MEDICINE

## 2021-03-25 PROCEDURE — 86140 C-REACTIVE PROTEIN: CPT | Performed by: INTERNAL MEDICINE

## 2021-03-25 PROCEDURE — 97110 THERAPEUTIC EXERCISES: CPT

## 2021-03-25 PROCEDURE — 80053 COMPREHEN METABOLIC PANEL: CPT | Performed by: INTERNAL MEDICINE

## 2021-03-25 PROCEDURE — 97163 PT EVAL HIGH COMPLEX 45 MIN: CPT

## 2021-03-25 PROCEDURE — 90682 RIV4 VACC RECOMBINANT DNA IM: CPT | Performed by: FAMILY MEDICINE

## 2021-03-25 PROCEDURE — 97167 OT EVAL HIGH COMPLEX 60 MIN: CPT

## 2021-03-25 PROCEDURE — 97530 THERAPEUTIC ACTIVITIES: CPT

## 2021-03-25 PROCEDURE — 84145 PROCALCITONIN (PCT): CPT | Performed by: PHYSICIAN ASSISTANT

## 2021-03-25 PROCEDURE — 90471 IMMUNIZATION ADMIN: CPT | Performed by: FAMILY MEDICINE

## 2021-03-25 PROCEDURE — 82728 ASSAY OF FERRITIN: CPT | Performed by: INTERNAL MEDICINE

## 2021-03-25 RX ORDER — GUAIFENESIN/DEXTROMETHORPHAN 100-10MG/5
10 SYRUP ORAL EVERY 4 HOURS PRN
Qty: 118 ML | Refills: 0 | Status: SHIPPED | OUTPATIENT
Start: 2021-03-25 | End: 2021-04-05

## 2021-03-25 RX ORDER — BENZONATATE 100 MG/1
100 CAPSULE ORAL 3 TIMES DAILY
Qty: 20 CAPSULE | Refills: 0 | Status: SHIPPED | OUTPATIENT
Start: 2021-03-25 | End: 2021-04-05

## 2021-03-25 RX ORDER — PREDNISONE 10 MG/1
TABLET ORAL
Qty: 18 TABLET | Refills: 0 | Status: SHIPPED | OUTPATIENT
Start: 2021-03-25 | End: 2021-04-05

## 2021-03-25 RX ORDER — FAMOTIDINE 20 MG/1
20 TABLET, FILM COATED ORAL 2 TIMES DAILY
Qty: 30 TABLET | Refills: 0 | Status: SHIPPED | OUTPATIENT
Start: 2021-03-26 | End: 2021-03-25 | Stop reason: HOSPADM

## 2021-03-25 RX ORDER — MULTIVITAMIN/IRON/FOLIC ACID 18MG-0.4MG
1 TABLET ORAL DAILY
Qty: 30 TABLET | Refills: 0 | Status: SHIPPED | OUTPATIENT
Start: 2021-03-31

## 2021-03-25 RX ORDER — ALBUTEROL SULFATE 90 UG/1
2 AEROSOL, METERED RESPIRATORY (INHALATION) EVERY 4 HOURS PRN
Qty: 8.5 G | Refills: 0 | Status: SHIPPED | OUTPATIENT
Start: 2021-03-25

## 2021-03-25 RX ADMIN — CARVEDILOL 25 MG: 25 TABLET, FILM COATED ORAL at 09:56

## 2021-03-25 RX ADMIN — LISINOPRIL 10 MG: 10 TABLET ORAL at 09:56

## 2021-03-25 RX ADMIN — CEFTRIAXONE 1000 MG: 1 INJECTION, SOLUTION INTRAVENOUS at 01:28

## 2021-03-25 RX ADMIN — OXYCODONE HYDROCHLORIDE AND ACETAMINOPHEN 1000 MG: 500 TABLET ORAL at 09:56

## 2021-03-25 RX ADMIN — BENZONATATE 100 MG: 100 CAPSULE ORAL at 17:12

## 2021-03-25 RX ADMIN — ATORVASTATIN CALCIUM 40 MG: 40 TABLET, FILM COATED ORAL at 17:12

## 2021-03-25 RX ADMIN — Medication 2000 UNITS: at 09:56

## 2021-03-25 RX ADMIN — DOXYCYCLINE 100 MG: 100 CAPSULE ORAL at 09:56

## 2021-03-25 RX ADMIN — INFLUENZA A VIRUS A/HAWAII/70/2019 (H1N1) RECOMBINANT HEMAGGLUTININ ANTIGEN, INFLUENZA A VIRUS A/MINNESOTA/41/2019 (H3N2) RECOMBINANT HEMAGGLUTININ ANTIGEN, INFLUENZA B VIRUS B/WASHINGTON/02/2019 RECOMBINANT HEMAGGLUTININ ANTIGEN, AND INFLUENZA B VIRUS B/PHUKET/3073/2013 RECOMBINANT HEMAGGLUTININ ANTIGEN 0.5 ML: 45; 45; 45; 45 INJECTION INTRAMUSCULAR at 15:22

## 2021-03-25 RX ADMIN — FAMOTIDINE 20 MG: 20 TABLET ORAL at 17:12

## 2021-03-25 RX ADMIN — BENZONATATE 100 MG: 100 CAPSULE ORAL at 09:56

## 2021-03-25 RX ADMIN — ENOXAPARIN SODIUM 30 MG: 30 INJECTION SUBCUTANEOUS at 09:55

## 2021-03-25 RX ADMIN — FAMOTIDINE 20 MG: 20 TABLET ORAL at 09:56

## 2021-03-25 RX ADMIN — ASPIRIN 81 MG CHEWABLE TABLET 81 MG: 81 TABLET CHEWABLE at 09:56

## 2021-03-25 RX ADMIN — ZINC SULFATE 220 MG (50 MG) CAPSULE 220 MG: CAPSULE at 09:56

## 2021-03-25 RX ADMIN — CARVEDILOL 25 MG: 25 TABLET, FILM COATED ORAL at 17:12

## 2021-03-25 NOTE — RESPIRATORY THERAPY NOTE
Home Oxygen Qualifying Test       Patient name: Zuleyma Major        : 1970   Date of Test:  2021  Diagnosis:      Home Oxygen Test:    **Medicare Guidelines require item(s) 1-5 on all ambulatory patients or 1 and 2 on non-ambulatory patients  1   Baseline SPO2 on Room Air at rest 94 %  2   SPO2 during exercise on Room Air 92 %  During exercise monitor SpO2  If SPO2 increases >=89% with ambulation do not add supplemental             oxygen  If <= 88% on room air add O2 via NC and titrate patient  Patient must be ambulated with O2 and titrated to > 88% with exertion  3   SPO2 on Oxygen at rest NA % NA lpm     4   SPO2 during exercise on Oxygen  NA % a liter flow of NA lpm     5   Exercise performed:          walking          []  Supplemental Home Oxygen is indicated  [x]  Client does not qualify for home oxygen  Respiratory Additional Notes- Pt walked in room he was unstable on his feet he almost fell twice and had mild shortness of breath, notified JOSE Scanlon, RTRRT/ACCS

## 2021-03-25 NOTE — ASSESSMENT & PLAN NOTE
Currently anxious about medical status on presentation  Overall feeling better today with improvement in symptoms

## 2021-03-25 NOTE — ASSESSMENT & PLAN NOTE
Wt Readings from Last 3 Encounters:   03/24/21 117 kg (257 lb 0 9 oz)   02/11/21 119 kg (262 lb)   10/01/20 109 kg (240 lb)       Euvolemic

## 2021-03-25 NOTE — ASSESSMENT & PLAN NOTE
Noncompliant with CPAP, patient was counseled  Monitor clinically  Oxygen remained acceptable at night    Patient will follow-up with Pulmonary

## 2021-03-25 NOTE — OCCUPATIONAL THERAPY NOTE
Occupational Therapy Evaluation/Treatment       03/25/21 1318   Note Type   Note type Evaluation   Restrictions/Precautions   Other Precautions Airborne/isolation; Fall Risk   Pain Assessment   Pain Assessment Tool Pain Assessment not indicated - pt denies pain   Home Living   Type of [facility] One level  (full flight to enter)   Bathroom Shower/Tub Tub/shower unit   Home Equipment   (none)   Prior Function   Level of [place] Independent with ADLs and functional mobility   Lives With Friend(s)   Receives Help From Friend(s)   ADL Assistance Independent   IADLs Independent   Vocational   ()   ADL   Eating Assistance 7  Independent   Grooming Assistance 7  Independent   UB Bathing Assistance 7  Independent   LB Bathing Assistance 5  Supervision/Setup   UB Dressing Assistance 7  Independent    [address]  5  Supervision/Setup   Bed Mobility   Supine to Sit 7  Independent   Sit to Supine 7  Independent   Transfers   Sit to Stand 5  Supervision   Stand to Sit 5  Supervision   Functional Mobility   Functional Mobility 4  Minimal assistance   Additional Comments 20 feet, unsteady x 2 requiring assist to recover   Balance   Static Sitting Good   Dynamic Sitting Good   Static Standing Fair   Dynamic Standing Fair -   Activity Tolerance   Activity Tolerance Patient limited by fatigue  (SOB with exertion )   Nurse Made Aware yes, Nataliia Harper Assessment   RUE Assessment WNL   LUE Assessment   LUE Assessment WNL   Cognition   Overall Cognitive Status WFL   Arousal/Participation Cooperative   Attention Within functional limits   Orientation Level Oriented X4   Following Commands Follows all commands and directions without difficulty   Comments pt is impulsive at times, poor insight to deficits (unsteady)   Assessment   Limitation Decreased ADL status; Decreased Safe judgement during ADL;Decreased endurance;Decreased self-care trans;Decreased high-level ADLs  (decreased balance and mobility )   Prognosis Good   Assessment Patient evaluated by Occupational Therapy  Patient admitted with Acute respiratory failure due to COVID-19 [facility])  The patients occupational profile, medical and therapy history includes a extensive additional review of physical, cognitive, or psychosocial history related to current functional performance  Comorbidities affecting functional mobility and ADLS include: CAD, CHF and hypertension  Prior to admission, patient was independent with functional mobility without assistive device, independent with ADLS and independent with IADLS  The evaluation identifies the following performance deficits: weakness, impaired balance, decreased endurance, increased fall risk, new onset of impairment of functional mobility, decreased ADLS, decreased IADLS, decreased activity tolerance, decreased safety awareness, impaired judgement and SOB upon exertion, that result in activity limitations and/or participation restrictions  This evaluation requires clinical decision making of high complexity, because the patient presents with comorbidites that affect occupational performance and required significant modification of tasks or assistance with consideration of multiple treatment options  The Barthel Index was used as a functional outcome tool presenting with a score of 60, indicating marked limitations of functional mobility and ADLS  The patient's raw score on the AM-PAC Daily Activity inpatient short form is 21, standardized score is 44 27, greater than 39 4  Patients at this level are likely to benefit from DC to home  Please refer to the recommendation of the Occupational Therapist for safe DC planning  Patient will benefit from skilled Occupational Therapy services to address above deficits and facilitate a safe return to prior level of function     Goals   Patient Goals go home today    STG Time Frame   (1-7 days)   Short Term Goal  Goals established to promote patient goal of going home:  Patient will increase standing tolerance to 5 minutes during ADL task to decrease assistance level and decrease fall risk;  Patient will increase functional mobility to and from bathroom with rolling walker independently to increase performance with ADLS and to use a toilet; Patient will tolerate 10 minutes of UE ROM/strengthening to increase general activity tolerance and performance in ADLS/IADLS; Patient will improve functional activity tolerance to 10 minutes of sustained functional tasks to increase participation in basic self-care and decrease assistance level;  Patient will be able to to verbalize understanding and perform energy conservation/proper body mechanics during ADLS and functional mobility at least 75% of the time with minimal cueing to decrease signs of fatigue and increase stamina to return to prior level of function;  Patient will increase dynamic standing balance to fair+ to improve postural stability and decrease fall risk during standing ADLS and transfers       LTG Time Frame   (8-14 days)   Long Term Goal Goals established to promote patient goal of going home:  Patient will increase standing tolerance to 10 minutes during ADL task to decrease assistance level and decrease fall risk;  Patient will increase functional mobility to and from bathroom independently to increase performance with ADLS and to use a toilet; Patient will tolerate 20 minutes of UE ROM/strengthening to increase general activity tolerance and performance in ADLS/IADLS; Patient will improve functional activity tolerance to 20 minutes of sustained functional tasks to increase participation in basic self-care and decrease assistance level;  Patient will be able to to verbalize understanding and perform energy conservation/proper body mechanics during ADLS and functional mobility at least 90% of the time without cueing to decrease signs of fatigue and increase stamina to return to prior level of function;  Patient will increase dynamic standing balance to good to improve postural stability and decrease fall risk during standing ADLS and transfers  Functional Transfer Goals   Pt Will Perform All Functional Transfers   (STG independent )   ADL Goals   Pt Will Perform Grooming Standing at sink  (STG independent )   Pt Will Perform Bathing   (LTG independent )   Pt Will Perform LE Dressing   (STG independent )   Pt Will Perform Toileting   (STG independent )   Plan   Treatment Interventions ADL retraining;Functional transfer training;UE strengthening/ROM; Patient/family training;Equipment evaluation/education; Activityengagement; Compensatory technique education   Goal Expiration Date 04/08/21   OT Frequency 3-5x/wk   Additional Treatment Session   Start Time 9141   End Time 5622   Treatment Assessment Toilet transfer supervision  Patient stood to wash hands at sink supervision  Functional mobility 30 feet with RW supervision  Gait imporved with RW  Patient educated on use, verbalized and demonstrated understading  Patient donned B socks seated independently  Functional mobility 20 feet with RW supervision  Tolerated well  Cooperative  Independent eating lunch at time therapist left room       Recommendation   OT Discharge Recommendation Return to previous environment with social support   Equipment Recommended   (rolling walker)   AM-PAC Daily Activity Inpatient   Lower Body Dressing 3   Bathing 3   Toileting 3   Upper Body Dressing 4   Grooming 4   Eating 4   Daily Activity Raw Score 21   Daily Activity Standardized Score (Calc for Raw Score >=11) 44 27   AM-PAC Applied Cognition Inpatient   Following a Speech/Presentation 4   Understanding Ordinary Conversation 4   Taking Medications 4   Remembering Where Things Are Placed or Put Away 4   Remembering List of 4-5 Errands 4   Taking Care of Complicated Tasks 4   Applied Cognition Raw Score 24   Applied Cognition Standardized Score 62 21   Barthel Index   Feeding 10   Bathing 0   Grooming Score 5   Dressing Score 5   Bladder Score 10   Bowels Score 10   Toilet Use Score 5   Transfers (Bed/Chair) Score 10   Mobility (Level Surface) Score 0   Stairs Score 5   Barthel Index Score 61   Licensure   [place] License Number  Tobin Reilly MS OTR/L [id number]

## 2021-03-25 NOTE — ASSESSMENT & PLAN NOTE
Patient presents with persistent cough, shortness of breath, hypoxia, diarrhea, generalized weakness  Diagnosed with COVID-19 3/17, symptoms started 3/10  Continues to improve  Initially required supplemental oxygen, now saturating adequately on room air  Treated with Mild COVID-19 treatment pathway  Received remdesivir, Decadron  Vitamin-D vitamin-C, Zinc, Pepcid  Not a candidate for convalescent plasma since symptom onset greater than 7 days ago  Inflammatory markers significantly improved  D-dimer negative  CRP improved to 21 8  Procalcitonin negative  Troponin negative x2    Patient improved with symptoms     Denies shortness of breath, cough is improving  Reports that his taste and appetite is coming back  Will discharge home on prednisone taper  Advised to monitor oxygenation  Home oxygen evaluation was done, patient does not require supplemental oxygen    Night oxygen remained more than 90%  educated about incentive spirometry and activity as tolerated  Educated about self proning  COVID precaution and follow-up explained

## 2021-03-25 NOTE — DISCHARGE SUMMARY
Discharge Summary - [facility] Internal Medicine    Patient Information: Patricia Benavides 45 y o  male MRN: [id number]  Unit/Bed#: [address] [place] [address] Encounter: [id number]    Discharging Physician / Practitioner: Augusto Dolan MD  PCP: Spencer Alejandra MD  Admission Date: 3/23/2021  Discharge Date: 03/25/21    Reason for Admission: Shortness of Breath (pt COVID +  Pt stating increasing SOB since March 10, diagnosed covid march 18th  c/o NVD  )      Discharge Diagnoses:     Principal Problem (Resolved):    Acute respiratory failure due to COVID-19 [facility])  Active Problems:    Benign essential hypertension    Cardiomyopathy ([facility] [address] )    Chronic combined systolic and diastolic congestive heart failure (HCC)    Secondary pulmonary arterial hypertension (HCC)    PARKER (obstructive sleep apnea)    Centrilobular emphysema (HCC)    Anxiety disorder    Ambulatory dysfunction    Esophageal reflux        * Acute respiratory failure due to COVID-19 (HCC)-resolved as of 3/25/2021  Assessment & Plan  Patient presents with persistent cough, shortness of breath, hypoxia, diarrhea, generalized weakness  Diagnosed with COVID-19 3/17, symptoms started 3/10  Continues to improve  Initially required supplemental oxygen, now saturating adequately on room air  Treated with Mild COVID-19 treatment pathway  Received remdesivir, Decadron  Vitamin-D vitamin-C, Zinc, Pepcid  Not a candidate for convalescent plasma since symptom onset greater than 7 days ago  Inflammatory markers significantly improved  D-dimer negative  CRP improved to 21 8  Procalcitonin negative  Troponin negative x2    Patient improved with symptoms     Denies shortness of breath, cough is improving  Reports that his taste and appetite is coming back  Will discharge home on prednisone taper  Advised to monitor oxygenation  Home oxygen evaluation was done, patient does not require supplemental oxygen    Night oxygen remained more than 90%  educated about incentive spirometry and activity as tolerated  Educated about self proning  COVID precaution and follow-up explained    Centrilobular emphysema ([facility] [address] )  Assessment & Plan  Respiratory protocol  Not taking inhalers at home  Advised to resume Incruse on discharge, albuterol as needed  Pulmonary follow-up discharge    PARKER (obstructive sleep apnea)  Assessment & Plan  Noncompliant with CPAP, patient was counseled  Monitor clinically  Oxygen remained acceptable at night  Patient will follow-up with Pulmonary      Chronic combined systolic and diastolic congestive heart failure (HCC)  Assessment & Plan  Wt Readings from Last 3 Encounters:   03/24/21 117 kg (257 lb 0 9 oz)   02/11/21 119 kg (262 lb)   10/01/20 109 kg (240 lb)       Euvolemic      Cardiomyopathy ([facility] [address] )  Assessment & Plan  Follows outpatient cardiology for nonischemic cardiomyopathy, EF 25-30% initially now improved to 55%  Suspected due to uncontrolled hypertension in the setting of left bundle branch block and alcohol abuse, prior cardiac catheterization no evidence of obstructive CAD  Continue aspirin, statin, beta-blocker    Benign essential hypertension  Assessment & Plan  Continue Coreg and lisinopril    Ambulatory dysfunction  Assessment & Plan  Patient reported that he has been in bed since 3/17 due to COVID symptoms associated with poor p o  Intake  Noted to be unsteady    Neuro exam nonfocal except intermittent diplopia secondary to prior left eye injury  Seen by PT/OT-recommended rolling walker    Anxiety disorder  Assessment & Plan  Currently anxious about medical status on presentation  Overall feeling better today with improvement in symptoms    Esophageal reflux  Assessment & Plan  Continue home PPI      Consultations During Hospital Stay:  None    Procedures Performed:     · None    Significant Findings:     · Refer to hospital course and above listed diagnosis related plan for details    Imaging while in hospital:    Xr Chest 1 View Portable    Result Date: 3/23/2021  Narrative: CHEST INDICATION:   covid positive  Patient has confirmed COVID-19  COMPARISON:  5/16/2020 EXAM PERFORMED/VIEWS:  XR CHEST PORTABLE FINDINGS: Cardiomediastinal silhouette appears unremarkable  Numerous areas of patchy pulmonary infiltrates are present bilaterally  No pleural fluid or pneumothorax  Osseous structures appear within normal limits for patient age  Impression: Bilateral infiltrates in keeping with Covid pneumonia Workstation performed: [id number]       Incidental Findings:   · None    Test Results Pending at Discharge (will require follow up):   · As per After Visit Summary     Outpatient Tests Requested:  · None    Complications:  Refer to hospital course and above listed diagnosis related plan, if any    Hospital Course: As per HPI  Narda Costello is a 45 y o  male patient with history of CHF, CAD, hypertension, sleep apnea, pulmonary hypertension, obesity who originally presented to the hospital on 3/23/2021 due to worsening shortness of breath on exertion, diarrhea, generalized weakness  Patient was diagnosed with COVID-19 on 03/17 after he developed a cough on 03/10  Stated cough initially got better however has since worsened over last few days with dyspnea upon exertion  Also reported loose bowel movements  He has lack of sense of taste or smell and is unable to eat anything due to poor appetite and generalized fatigue  Reports high fevers over last few days  He is noncompliant with CPAP  Compliant with medications however  Prior hx of heavy etoh and tobacco abuse  Patient was noted to have mild hypoxia and subsequently admitted to the hospital      Please see above list of diagnoses and related plan for additional information         Condition at Discharge: stable     Discharge Day Visit / Exam:     Subjective:    Feels much better  Shortness of breath has improved  Denies any chest pain  Cough is improving  Reports that his taste &appetite is coming back and strength is improving     Vitals: Blood Pressure: 130/68 (03/25/21 1700)  Pulse: 75 (03/25/21 0829)  Temperature: 98 2 °F (36 8 °C) (03/25/21 0829)  Temp Source: Oral (03/25/21 0829)  Respirations: 22 (03/25/21 0829)  Height: 5' 10" (177 8 cm) (03/23/21 1931)  Weight - Scale: 117 kg (257 lb 0 9 oz) (03/24/21 0600)  SpO2: 94 % (03/25/21 1700) on room air  Exam:   Physical Exam  Constitutional:       General: He is not in acute distress  Appearance: He is obese  HENT:      Head: Normocephalic and atraumatic  Eyes:      General: No visual field deficit  Neck:      Musculoskeletal: Neck supple  Cardiovascular:      Rate and Rhythm: Normal rate  Pulmonary:      Effort: Pulmonary effort is normal  No respiratory distress  Breath sounds: No wheezing, rhonchi or rales  Comments: Diminished but clear  Chest:      Chest wall: No tenderness  Abdominal:      General: Bowel sounds are normal  There is no distension  Palpations: Abdomen is soft  Tenderness: There is no abdominal tenderness  There is no guarding or rebound  Musculoskeletal:      Right lower leg: No edema  Left lower leg: No edema  Skin:     General: Skin is warm and dry  Findings: No rash  Neurological:      General: No focal deficit present  Mental Status: He is alert and oriented to person, place, and time  Mental status is at baseline  GCS: GCS eye subscore is 4  GCS verbal subscore is 5  GCS motor subscore is 6  Cranial Nerves: No cranial nerve deficit, dysarthria or facial asymmetry  Sensory: Sensation is intact  Motor: Motor function is intact  No weakness, tremor or abnormal muscle tone  Coordination: Coordination is intact   Coordination normal  Finger-Nose-Finger Test and Heel to [facility] Test normal    Psychiatric:         Mood and Affect: Mood normal          Discharge instructions/Information to patient and family:(Discharge Medications and Follow up):   See after visit summary for information provided to patient and family  Provisions for Follow-Up Care:  See after visit summary for information related to follow-up care and any pertinent home health orders  Disposition: Home    Planned Readmission:  No     Discharge Statement:  I spent 45 minutes discharging the patient  This time was spent on the day of discharge  I had direct contact with the patient on the day of discharge  Greater than 50% of the total time was spent examining patient, answering all patient questions, arranging and discussing plan of care with patient as well as directly providing post-discharge instructions  Additional time then spent on discharge activities  Discharge Medications:  See after visit summary for reconciled discharge medications provided to patient and family  ** Please Note: "This note has been constructed using a voice recognition system  Therefore there may be syntax, spelling, and/or grammatical errors   Please call if you have any questions  "**

## 2021-03-25 NOTE — DISCHARGE INSTRUCTIONS

## 2021-03-25 NOTE — ASSESSMENT & PLAN NOTE
Respiratory protocol  Not taking inhalers at home  Advised to resume Incruse on discharge, albuterol as needed  Pulmonary follow-up discharge

## 2021-03-25 NOTE — UTILIZATION REVIEW
Continued Stay Review    Date: 3-25-21                           Current Patient Class: inpatient  Current Level of Care: medical surgical    HPI:51 y o  male initially admitted on 3-23-21 for acute respiratory failure from covid 19    Assessment/Plan:     Oxygen weaned to room air  Maintaining O2 sats 92 to 93 %  Evaluated for home O2  Patient does not require home O2  Today continue iv dexamethasone, iv remdesivir and iv ceftriaxone  Continue isolation        Pertinent Labs/Diagnostic Results:       Results from last 7 days   Lab Units 03/25/21 0446 03/24/21 0449 03/23/21 2106   WBC Thousand/uL 6 99 4 93 7 28   HEMOGLOBIN g/dL 13 9 14 2 15 1   HEMATOCRIT % 44 9 44 9 46 5   PLATELETS Thousands/uL 305 231 229   NEUTROS ABS Thousands/µL  --  4 02 5 08         Results from last 7 days   Lab Units 03/25/21 0446 03/24/21 0449 03/23/21 2106   SODIUM mmol/L 138 135* 136   POTASSIUM mmol/L 3 9 4 2 4 0   CHLORIDE mmol/L 103 102 100   CO2 mmol/L 23 24 24   ANION GAP mmol/L 12 9 12   BUN mg/dL 26* 15 15   CREATININE mg/dL 1 15 1 13 1 02   EGFR ml/min/1 73sq m 73 74 84   CALCIUM mg/dL 8 5 8 5 8 7     Results from last 7 days   Lab Units 03/25/21 0446 03/24/21 0449 03/23/21 2106   AST U/L 31 45 58*   ALT U/L 41 41 43   ALK PHOS U/L 54 60 63   TOTAL PROTEIN g/dL 7 1 7 2 7 4   ALBUMIN g/dL 2 6* 2 7* 2 8*   TOTAL BILIRUBIN mg/dL 0 30 0 30 0 40         Results from last 7 days   Lab Units 03/25/21 0446 03/24/21 0449 03/23/21 2106   GLUCOSE RANDOM mg/dL 155* 161* 116         Results from last 7 days   Lab Units 03/25/21 0446 03/23/21 2106   TROPONIN I ng/mL <0 02 <0 02     Results from last 7 days   Lab Units 03/25/21 0446 03/24/21 0449 03/23/21 2210   D-DIMER QUANTITATIVE ug/ml FEU 0 45 0 66* 0 63*     Results from last 7 days   Lab Units 03/23/21  2210   PROTIME seconds 12 6   INR  0 96   PTT seconds 32         Results from last 7 days   Lab Units 03/24/21  0449   PROCALCITONIN ng/ml <0 05     Results from last 7 days   Lab Units 03/23/21 2106   LACTIC ACID mmol/L 1 0     Results from last 7 days   Lab Units 03/25/21 0446   FERRITIN ng/mL 660*       Results from last 7 days   Lab Units 03/25/21 0446 03/24/21 0449 03/23/21 2106   CRP mg/L 21 8* 52 2* 50 8*   SED RATE mm/hour  --   --  61*         Results from last 7 days   Lab Units 03/23/21 2211   CLARITY UA  Clear   COLOR UA  Yellow   SPEC GRAV UA  1 025   PH UA  6 0   GLUCOSE UA mg/dl Negative   KETONES UA mg/dl Negative   BLOOD UA  Moderate*   PROTEIN UA mg/dl >=300*   NITRITE UA  Negative   BILIRUBIN UA  Interference- unable to analyze*   UROBILINOGEN UA E U /dl 0 2   LEUKOCYTES UA  Negative   WBC UA /hpf 0-1   RBC UA /hpf None Seen   BACTERIA UA /hpf Occasional   EPITHELIAL CELLS WET PREP /hpf Occasional   MUCUS THREADS  Moderate*       Results from last 7 days   Lab Units 03/23/21 2211 03/23/21 2106   BLOOD CULTURE   --  No Growth at 24 hrs  No Growth at 24 hrs     URINE CULTURE  No Growth <1000 cfu/mL  --                Vital Signs:       Date/Time  Temp  Pulse  Resp  BP  MAP   SpO2   O2 Device   03/25/21 1045  --  --  --  --  --  92 %   None (Room air)   03/25/21 0829  98 2 °F (36 8 °C)  75  22  120/72  91  --   --   03/25/21 0000  98 2 °F (36 8 °C)  67  21  117/66  86  93 %   None (Room air)           Medications:     ascorbic acid, 1,000 mg, Oral, Q12H BRISEYDA  aspirin, 81 mg, Oral, Daily  atorvastatin, 40 mg, Oral, Daily With Dinner  benzonatate, 100 mg, Oral, TID  carvedilol, 25 mg, Oral, BID With Meals  cefTRIAXone, 1,000 mg, Intravenous, Q24H  cholecalciferol, 2,000 Units, Oral, Daily  dexamethasone, 6 mg, Intravenous, Q24H  doxycycline hyclate, 100 mg, Oral, Q12H BRISEYDA  enoxaparin, 30 mg, Subcutaneous, Q12H BRISEYDA  famotidine, 20 mg, Oral, BID  influenza vaccine, 0 5 mL, Intramuscular, Once  lisinopril, 10 mg, Oral, Daily  melatonin, 6 mg, Oral, HS  zinc sulfate, 220 mg, Oral, Daily    Followed by  Briana Van ON 3/31/2021] multivitamin-minerals, 1 tablet, Oral, Daily  remdesivir, 100 mg, Intravenous, Q24H      Continuous IV Infusions:     PRN Meds:  acetaminophen, 650 mg, Oral, Q6H PRN  albuterol, 2 puff, Inhalation, Q4H PRN  ALPRAZolam, 0 5 mg, Oral, BID PRN  calcium carbonate, 1,000 mg, Oral, Daily PRN  dextromethorphan-guaiFENesin, 10 mL, Oral, Q4H PRN  ondansetron, 4 mg, Intravenous, Q6H PRN        Discharge Plan: to be determined     Network Utilization Review Department  ATTENTION: Please call with any questions or concerns to [phone] and carefully listen to the prompts so that you are directed to the right person  All voicemails are confidential   Lg Martins all requests for admission clinical reviews, approved or denied determinations and any other requests to dedicated fax number below belonging to the campus where the patient is receiving treatment   List of dedicated fax numbers for the Facilities:  [address] DENIALS (Administrative/Medical Necessity) [phone]   [address] (Maternity/NICU/Pediatrics) [phone]   [address] [place] [address] [address] [place] [address] ([place]geri) [address] [place] [address] [address] [place] [id number] [phone]

## 2021-03-25 NOTE — PHYSICAL THERAPY NOTE
PT EVALUATION     03/25/21 1405   Note Type   Note type Evaluation   Pain Assessment   Pain Assessment Tool Pain Assessment not indicated - pt denies pain   Home Living   Type of [address] One level;Stairs to enter with rails  (Full flight to enter)   Home Equipment   (None)   Additional Comments Patient ambulating independently without assistive device prior to admission   Prior Function   Level of [place] Independent with ADLs and functional mobility   Lives With Other (Comment)  (Roommate as per patient)   Receives Help From Other (Comment)  (Room mate)   ADL Assistance Independent   IADLs Independent   Vocational   ( )   Restrictions/Precautions   Other Precautions Airborne/isolation;Contact/isolation; Fall Risk   General   Additional Pertinent History Chart reviewed, patient admitted with acute respiratory failure due to COVID-19  Patient now presents is unsteady with gait and high risk to fall  Family/Caregiver Present No   Cognition   Overall Cognitive Status WFL   Arousal/Participation Cooperative   Attention Within functional limits   Orientation Level Oriented X4   Following Commands Follows all commands and directions without difficulty   RLE Assessment   RLE Assessment   (ROM WFL, strength 3+/5)   LLE Assessment   LLE Assessment   (ROM WFL, strength 3+/5)   Coordination   Movements are Fluid and Coordinated 0   Bed Mobility   Supine to Sit 7  Independent   Sit to Supine 7  Independent   Transfers   Sit to Stand 4  Minimal assistance   Additional items Assist x 1;Verbal cues  (Loss of balance posteriorly at times)   Stand to Sit 5  Supervision   Additional items Verbal cues  (Cuing for safe technique to prevent fall)   Ambulation/Elevation   Gait Assistance   (Min to moderate assist)   Additional items Assist x 1;Verbal cues; Tactile cues   Assistive Device   (None)   Distance 30 ft with change in direction and lateral balance loss several times      Balance   Static Sitting Good   Dynamic Sitting Good   Static Standing Fair   Dynamic Standing Fair -   Ambulatory Poor +   Activity Tolerance   Activity Tolerance Patient limited by fatigue  (Limited by shortness of breathe)   Nurse Made Aware Yes   Assessment   Prognosis Good   Problem List Decreased strength;Decreased range of motion;Decreased endurance; Impaired balance;Decreased mobility; Decreased coordination   Assessment Patient seen for Physical Therapy evaluation  Patient admitted with Acute respiratory failure due to COVID-19 [facility])  Comorbidities affecting patient's physical performance include:CHF, CAD, hypertension, sleep apnea, pulmonary hypertension   Personal factors affecting patient at time of initial evaluation include: lives in two story house, inability to ambulate household distances, inability to navigate community distances, inability to navigate level surfaces without external assistance, inability to perform dynamic tasks in community, limited home support, inability to perform caregiver support/tasks, inability to perform physical activity, inability to perform ADLS and inability to perform IADLS   Prior to admission, patient was independent with functional mobility without assistive device, independent with ADLS, independent with IADLS, living in a multi-level home, ambulating household distance, ambulating community distances and works part time  Please find objective findings from Physical Therapy assessment regarding body systems outlined above with impairments and limitations including weakness, decreased ROM, impaired balance, decreased endurance, impaired coordination, gait deviations, decreased activity tolerance, decreased functional mobility tolerance, fall risk and SOB upon exertion  The Barthel Index was used as a functional outcome tool presenting with a score of 60 today indicating marked limitations of functional mobility and ADLS    Patient's clinical presentation is currently unstable/unpredictable as seen in patient's presentation of vital sign response, changing level of pain, increased fall risk, new onset of impairment of functional mobility, decreased endurance and new onset of weakness  Pt would benefit from continued Physical Therapy treatment to address deficits as defined above and maximize level of functional mobility  As demonstrated by objective findings, the assigned level of complexity for this evaluation is high  The patient's AM-PAC Basic Mobility Inpatient Short Form Raw Score is 20, Standardized Score is 43 99  A standardized score greater than 42 9 suggests the patient may benefit from discharge to home  Please also refer to the recommendation of the Physical Therapist for safe discharge planning  Goals   Patient Goals To go home and get stronger   STG Expiration Date 04/01/21   Short Term Goal #1 Transfers and gait with rolling walker independently   Short Term Goal #2 Gait endurance to functional household distances   LTG Expiration Date 04/08/21   Long Term Goal #1 Gait and transfers without assistive   Long Term Goal #2 gait endurance to funcitonal community distances   Plan   Treatment/Interventions ADL retraining;Functional transfer training;LE strengthening/ROM; Elevations; Therapeutic exercise; Endurance training;Patient/family training;Equipment eval/education;Gait training; Compensatory technique education   PT Frequency 5x/wk   Recommendation   PT Discharge Recommendation Return to previous environment with social support  (Need for a roller walker at home)   Equipment Recommended Nyra Deal  (Roller walker)   Walker Package Recommended Wheeled walker   Daniel Rueda 435   Turning in Bed Without Bedrails 4   Lying on Back to Sitting on Edge of Flat Bed 4   Moving Bed to Chair 3   Standing Up From Chair 3   Walk in Room 3   Climb 3-5 Stairs 3   Basic Mobility Inpatient Raw Score 20   Basic Mobility Standardized Score 43 99   Barthel Index   Feeding 10 Bathing 0   Grooming Score 5   Dressing Score 5   Bladder Score 10   Bowels Score 10   Toilet Use Score 5   Transfers (Bed/Chair) Score 10   Mobility (Level Surface) Score 0   Stairs Score 5   Barthel Index Score 61   Licensure   [place] License Number  Elvira Cordova PT [id number]     PT TREATMENT  Time In:1355   Time Out:1405  Total Time: 10min      S:  No pain   O:  -gait with roller walker with supervision and verbal cues for safe walker usage, endurance 50 feet with numerous changes in direction  A:  Patient agreeable to use of roller walker and increasing functional mobility increase to regain independent function  P: home with family support and roller walker    Yen aMrtin, PT

## 2021-03-25 NOTE — ASSESSMENT & PLAN NOTE
Patient reported that he has been in bed since 3/17 due to COVID symptoms associated with poor p o  Intake  Noted to be unsteady    Neuro exam nonfocal except intermittent diplopia secondary to prior left eye injury  Seen by PT/OT-recommended rolling walker

## 2021-03-26 ENCOUNTER — TRANSITIONAL CARE MANAGEMENT (OUTPATIENT)
Dept: FAMILY MEDICINE CLINIC | Facility: CLINIC | Age: 51
End: 2021-03-26

## 2021-03-26 NOTE — PLAN OF CARE
Problem: Potential for Falls  Goal: Patient will remain free of falls  Description: INTERVENTIONS:  - Assess patient frequently for physical needs  -  Identify cognitive and physical deficits and behaviors that affect risk of falls    -  [place] fall precautions as indicated by assessment   - Educate patient/family on patient safety including physical limitations  - Instruct patient to call for assistance with activity based on assessment  - Modify environment to reduce risk of injury  - Consider OT/PT consult to assist with strengthening/mobility  3/25/2021 2100 by Merline Jacob, RN  Outcome: Adequate for Discharge  3/25/2021 2054 by Merline Jacob, RN  Outcome: Progressing     Problem: RESPIRATORY - ADULT  Goal: Achieves optimal ventilation and oxygenation  Description: INTERVENTIONS:  - Assess for changes in respiratory status  - Assess for changes in mentation and behavior  - Position to facilitate oxygenation and minimize respiratory effort  - Oxygen administered by appropriate delivery if ordered  - Initiate smoking cessation education as indicated  - Encourage broncho-pulmonary hygiene including cough, deep breathe, Incentive Spirometry  - Assess the need for suctioning and aspirate as needed  - Assess and instruct to report SOB or any respiratory difficulty  - Respiratory Therapy support as indicated  3/25/2021 2100 by Merline Jacob, RN  Outcome: Adequate for Discharge  3/25/2021 2054 by Merline Jacob, RN  Outcome: Progressing     Problem: INFECTION - ADULT  Goal: Absence or prevention of progression during hospitalization  Description: INTERVENTIONS:  - Assess and monitor for signs and symptoms of infection  - Monitor lab/diagnostic results  - Monitor all insertion sites, i e  indwelling lines, tubes, and drains  - Monitor endotracheal if appropriate and nasal secretions for changes in amount and color  - [place] appropriate cooling/warming therapies per order  - Administer medications as ordered  - Instruct and encourage patient and family to use good hand hygiene technique  - Identify and instruct in appropriate isolation precautions for identified infection/condition  3/25/2021 2100 by Parag Guillermo RN  Outcome: Adequate for Discharge  3/25/2021 2054 by Parag Guillermo RN  Outcome: Progressing

## 2021-03-26 NOTE — UTILIZATION REVIEW
Notification of Discharge  This is a Notification of Discharge from our facility [address]  Please be advised that this patient has been discharge from our facility  Below you will find the admission and discharge date and time including the patients disposition  PRESENTATION DATE: 3/23/2021  7:25 PM  OBS ADMISSION DATE:   IP ADMISSION DATE: 3/23/21 2049   DISCHARGE DATE: 3/25/2021  6:30 PM  DISPOSITION: Home/Self Care Home/Self Care   Admission Orders listed below:  Admission Orders (From admission, onward)     Ordered        03/23/21 2049  Inpatient Admission  Once                   Please contact the UR Department if additional information is required to close this patient's authorization/case  Louisa Patterson  [facility] Utilization Review Department  Main: [phone] x carefully listen to the prompts  All voicemails are confidential   [email or web address]  Send all requests for admission clinical reviews, approved or denied determinations and any other requests to dedicated fax number below belonging to the campus where the patient is receiving treatment   List of dedicated fax numbers:  [address] DENIALS (Administrative/Medical Necessity) [phone]   [address] (Maternity/NICU/Pediatrics) [phone]   Mervin Headley [phone]   Erica Kinney [phone]   Ann Lynn [phone]   Merlinda [place] [address] [phone]   [facility]  [phone]   [address] 1000 [facility] [phone]

## 2021-03-26 NOTE — PLAN OF CARE
Problem: Potential for Falls  Goal: Patient will remain free of falls  Description: INTERVENTIONS:  - Assess patient frequently for physical needs  -  Identify cognitive and physical deficits and behaviors that affect risk of falls    -  [place] fall precautions as indicated by assessment   - Educate patient/family on patient safety including physical limitations  - Instruct patient to call for assistance with activity based on assessment  - Modify environment to reduce risk of injury  - Consider OT/PT consult to assist with strengthening/mobility  Outcome: Progressing     Problem: RESPIRATORY - ADULT  Goal: Achieves optimal ventilation and oxygenation  Description: INTERVENTIONS:  - Assess for changes in respiratory status  - Assess for changes in mentation and behavior  - Position to facilitate oxygenation and minimize respiratory effort  - Oxygen administered by appropriate delivery if ordered  - Initiate smoking cessation education as indicated  - Encourage broncho-pulmonary hygiene including cough, deep breathe, Incentive Spirometry  - Assess the need for suctioning and aspirate as needed  - Assess and instruct to report SOB or any respiratory difficulty  - Respiratory Therapy support as indicated  Outcome: Progressing

## 2021-03-27 LAB
ATRIAL RATE: 97 BPM
P AXIS: 39 DEGREES
PR INTERVAL: 170 MS
QRS AXIS: 32 DEGREES
QRSD INTERVAL: 88 MS
QT INTERVAL: 360 MS
QTC INTERVAL: 457 MS
T WAVE AXIS: 72 DEGREES
VENTRICULAR RATE: 97 BPM

## 2021-03-27 PROCEDURE — 93010 ELECTROCARDIOGRAM REPORT: CPT | Performed by: INTERNAL MEDICINE

## 2021-03-29 LAB
BACTERIA BLD CULT: NORMAL
BACTERIA BLD CULT: NORMAL

## 2021-03-30 ENCOUNTER — RA CDI HCC (OUTPATIENT)
Dept: OTHER | Facility: HOSPITAL | Age: 51
End: 2021-03-30

## 2021-03-30 LAB
DME PARACHUTE DELIVERY DATE ACTUAL: NORMAL
DME PARACHUTE DELIVERY DATE REQUESTED: NORMAL
DME PARACHUTE ITEM DESCRIPTION: NORMAL
DME PARACHUTE ORDER STATUS: NORMAL
DME PARACHUTE SUPPLIER NAME: NORMAL
DME PARACHUTE SUPPLIER PHONE: NORMAL

## 2021-03-30 NOTE — PROGRESS NOTES
[place] [address]  coding oppertunities             Chart reviewed, (number of) suggestions sent to provider: 2     Problem listed updated  Provider Accepted, (number of) suggestions accepted: 2              [place] [address]  coding oppertunities             Chart reviewed, (number of) suggestions sent to provider: 2  E66 01  Morbid (severe) obesity due to excess calories-BMI of 40 and higher or BMI of 35-39 9 with comorbid condition      F32 5  Major depressive disorder, single episode, in full remission

## 2021-03-31 PROBLEM — E66.01 CLASS 2 SEVERE OBESITY WITH BODY MASS INDEX (BMI) OF 35 TO 39.9 WITH SERIOUS COMORBIDITY (HCC): Status: ACTIVE | Noted: 2021-03-31

## 2021-03-31 PROBLEM — E66.812 CLASS 2 SEVERE OBESITY WITH BODY MASS INDEX (BMI) OF 35 TO 39.9 WITH SERIOUS COMORBIDITY (HCC): Status: ACTIVE | Noted: 2021-03-31

## 2021-04-05 ENCOUNTER — OFFICE VISIT (OUTPATIENT)
Dept: FAMILY MEDICINE CLINIC | Facility: CLINIC | Age: 51
End: 2021-04-05
Payer: COMMERCIAL

## 2021-04-05 VITALS
BODY MASS INDEX: 38.22 KG/M2 | TEMPERATURE: 97.5 F | OXYGEN SATURATION: 96 % | DIASTOLIC BLOOD PRESSURE: 102 MMHG | SYSTOLIC BLOOD PRESSURE: 188 MMHG | HEART RATE: 111 BPM | WEIGHT: 267 LBS | HEIGHT: 70 IN | RESPIRATION RATE: 24 BRPM

## 2021-04-05 DIAGNOSIS — Z86.16 HISTORY OF COVID-19: ICD-10-CM

## 2021-04-05 DIAGNOSIS — Z76.89 ENCOUNTER FOR SUPPORT AND COORDINATION OF TRANSITION OF CARE: Primary | ICD-10-CM

## 2021-04-05 PROCEDURE — 3008F BODY MASS INDEX DOCD: CPT | Performed by: PHYSICIAN ASSISTANT

## 2021-04-05 PROCEDURE — 99467 PED CRIT CARE TRANSPORT ADDL: CPT | Performed by: FAMILY MEDICINE

## 2021-04-05 RX ORDER — FAMOTIDINE 20 MG/1
20 TABLET, FILM COATED ORAL 2 TIMES DAILY
COMMUNITY
Start: 2021-03-25

## 2021-04-05 NOTE — PROGRESS NOTES
Assessment/Plan:      Diagnoses and all orders for this visit:    Encounter for support and coordination of transition of care    History of COVID-19    Other orders  -     famotidine (PEPCID) 20 mg tablet; Take 20 mg by mouth 2 (two) times a day      Patient doing well since discharge, just with some baseline ZAYAS  He should fu at his convenience for annual physical and establish care visit  His BP was elevated however he had not taken his meds for few days, advised to start taking as he now has them  All questions were answered  Subjective:     Patient ID: Tom Moore is a 45 y o  male  Patient presents for hospital follow-up  Admitted for COVID 19  Treated with mild protocol  Saturating well on room air up at discharge  He is new to our practice,   Followed with   Clem Shelton prior  Since hospital discharge he is breathing well, his COVID symptoms have completely resolved  He has no complaints today  Review of Systems   Constitutional: Negative for chills and fever  HENT: Negative for ear pain and sore throat  Eyes: Negative for pain and visual disturbance  Respiratory: Positive for shortness of breath  Negative for cough  Cardiovascular: Negative for chest pain and palpitations  Gastrointestinal: Negative for abdominal pain and vomiting  Genitourinary: Negative for dysuria and hematuria  Musculoskeletal: Negative for arthralgias and back pain  Skin: Negative for color change and rash  Neurological: Negative for seizures and syncope  All other systems reviewed and are negative  Objective:    Vitals:    04/05/21 1409   BP: (!) 188/102   BP Location: Left arm   Patient Position: Sitting   Cuff Size: Large   Pulse: (!) 111   Resp: (!) 24   Temp: 97 5 °F (36 4 °C)   TempSrc: Tympanic   SpO2: 96%   Weight: 121 kg (267 lb)   Height: 5' 10" (1 778 m)        Physical Exam  Constitutional:       Appearance: He is well-developed  He is obese     HENT:      Head: Normocephalic and atraumatic  Cardiovascular:      Rate and Rhythm: Normal rate and regular rhythm  Heart sounds: No murmur  Pulmonary:      Effort: Pulmonary effort is normal       Breath sounds: Normal breath sounds  Decreased air movement present  No wheezing  Neurological:      Mental Status: He is alert and oriented to person, place, and time  TCM Call (since 3/10/2021)     Date and time call was made  3/26/2021  1:40 PM    Hospital care reviewed  Records reviewed    Patient [facility]ial[facility]  [facility]        Date of Admission  03/23/21    Date of discharge  03/25/21    Diagnosis  Covid-19, Centrilobular emphysema, acute bacterial bronchitis  Disposition  Home    Were the patients medications reviewed and updated  Yes    Current Symptoms  Fatigue    Fatigue severity  Moderate      TCM Call (since 3/10/2021)     Scheduled for follow up? Yes    Did you obtain your prescribed medications  No    Why were you unable to obtain your medications  Patient has family member picking up medication for patient since he was diagnosed with Covid      Do you need help managing your prescriptions or medications  No    Is transportation to your appointment needed  No    I have advised the patient to call PCP with any new or worsening symptoms  GERARDO Pretty, [address] [facility]

## 2021-05-07 ENCOUNTER — IMMUNIZATIONS (OUTPATIENT)
Dept: FAMILY MEDICINE CLINIC | Facility: HOSPITAL | Age: 51
End: 2021-05-07

## 2021-05-07 DIAGNOSIS — Z23 ENCOUNTER FOR IMMUNIZATION: Primary | ICD-10-CM

## 2021-05-07 PROCEDURE — 0011A SARS-COV-2 / COVID-19 MRNA VACCINE (MODERNA) 100 MCG: CPT

## 2021-05-07 PROCEDURE — 91301 SARS-COV-2 / COVID-19 MRNA VACCINE (MODERNA) 100 MCG: CPT

## 2021-05-26 ENCOUNTER — RA CDI HCC (OUTPATIENT)
Dept: OTHER | Facility: HOSPITAL | Age: 51
End: 2021-05-26

## 2021-05-26 NOTE — PROGRESS NOTES
[facility]  coding opportunities             Chart reviewed, (number of) suggestions sent to provider: 2        Provider Rejected Suggestions for: different depression code used  E66 01 not used     Patients insurance company: [employer] (Medicare and Commercial for [employer] and [employer])     Visit status: Patient arrived for their scheduled appointment     Provider never responded to [facility]  coding request     [facility]  coding opportunities             Chart reviewed, (number of) suggestions sent to provider: 2  E66 01  Morbid (severe) obesity due to excess calories-BMI of 40 and higher or BMI of 35-39 9 with comorbid condition      F32 5  Major depressive disorder, single episode, in full remission          Patients insurance company: [employer] (Medicare and Commercial for [employer] and [employer])

## 2021-06-02 ENCOUNTER — OFFICE VISIT (OUTPATIENT)
Dept: FAMILY MEDICINE CLINIC | Facility: CLINIC | Age: 51
End: 2021-06-02
Payer: COMMERCIAL

## 2021-06-02 VITALS
HEIGHT: 70 IN | RESPIRATION RATE: 18 BRPM | DIASTOLIC BLOOD PRESSURE: 80 MMHG | WEIGHT: 265 LBS | BODY MASS INDEX: 37.94 KG/M2 | OXYGEN SATURATION: 95 % | SYSTOLIC BLOOD PRESSURE: 126 MMHG | TEMPERATURE: 96.2 F | HEART RATE: 88 BPM

## 2021-06-02 DIAGNOSIS — Z71.82 EXERCISE COUNSELING: ICD-10-CM

## 2021-06-02 DIAGNOSIS — N63.0 BREAST MASS IN MALE: Primary | ICD-10-CM

## 2021-06-02 DIAGNOSIS — Z71.3 DIETARY COUNSELING: ICD-10-CM

## 2021-06-02 DIAGNOSIS — F33.9 EPISODE OF RECURRENT MAJOR DEPRESSIVE DISORDER, UNSPECIFIED DEPRESSION EPISODE SEVERITY (HCC): ICD-10-CM

## 2021-06-02 PROCEDURE — 1036F TOBACCO NON-USER: CPT | Performed by: FAMILY MEDICINE

## 2021-06-02 PROCEDURE — 99213 OFFICE O/P EST LOW 20 MIN: CPT | Performed by: FAMILY MEDICINE

## 2021-06-02 PROCEDURE — 3725F SCREEN DEPRESSION PERFORMED: CPT | Performed by: FAMILY MEDICINE

## 2021-06-02 PROCEDURE — 3008F BODY MASS INDEX DOCD: CPT | Performed by: FAMILY MEDICINE

## 2021-06-02 PROCEDURE — 3074F SYST BP LT 130 MM HG: CPT | Performed by: FAMILY MEDICINE

## 2021-06-02 PROCEDURE — 3079F DIAST BP 80-89 MM HG: CPT | Performed by: FAMILY MEDICINE

## 2021-06-02 RX ORDER — BUPROPION HYDROCHLORIDE 150 MG/1
150 TABLET ORAL EVERY MORNING
Qty: 60 TABLET | Refills: 1 | Status: SHIPPED | OUTPATIENT
Start: 2021-06-02 | End: 2021-08-04 | Stop reason: SDUPTHER

## 2021-06-02 NOTE — PATIENT INSTRUCTIONS
Obesity   AMBULATORY CARE:   Obesity  is when your body mass index (BMI) is greater than 30  Your healthcare provider will use your height and weight to measure your BMI  The risks of obesity include  many health problems, such as injuries or physical disability  You may need tests to check for the following:  · Diabetes    · High blood pressure or high cholesterol    · Heart disease    · Gallbladder or liver disease    · Cancer of the colon, breast, prostate, liver, or kidney    · Sleep apnea    · Arthritis or gout    Seek care immediately if:   · You have a severe headache, confusion, or difficulty speaking  · You have weakness on one side of your body  · You have chest pain, sweating, or shortness of breath  Contact your healthcare provider if:   · You have symptoms of gallbladder or liver disease, such as pain in your upper abdomen  · You have knee or hip pain and discomfort while walking  · You have symptoms of diabetes, such as intense hunger and thirst, and frequent urination  · You have symptoms of sleep apnea, such as snoring or daytime sleepiness  · You have questions or concerns about your condition or care  Treatment for obesity  focuses on helping you lose weight to improve your health  Even a small decrease in BMI can reduce the risk for many health problems  Your healthcare provider will help you set a weight-loss goal   · Lifestyle changes  are the first step in treating obesity  These include making healthy food choices and getting regular physical activity  Your healthcare provider may suggest a weight-loss program that involves coaching, education, and therapy  · Medicine  may help you lose weight when it is used with a healthy diet and physical activity  · Surgery  can help you lose weight if you are very obese and have other health problems  There are several types of weight-loss surgery  Ask your healthcare provider for more information      Be successful losing weight:   · Set small, realistic goals  An example of a small goal is to walk for 20 minutes 5 days a week  Anther goal is to lose 5% of your body weight  · Tell friends, family members, and coworkers about your goals  and ask for their support  Ask a friend to lose weight with you, or join a weight-loss support group  · Identify foods or triggers that may cause you to overeat , and find ways to avoid them  Remove tempting high-calorie foods from your home and workplace  Place a bowl of fresh fruit on your kitchen counter  If stress causes you to eat, then find other ways to cope with stress  · Keep a diary to track what you eat and drink  Also write down how many minutes of physical activity you do each day  Weigh yourself once a week and record it in your diary  Eating changes: You will need to eat 500 to 1,000 fewer calories each day than you currently eat to lose 1 to 2 pounds a week  The following changes will help you cut calories:  · Eat smaller portions  Use small plates, no larger than 9 inches in diameter  Fill your plate half full of fruits and vegetables  Measure your food using measuring cups until you know what a serving size looks like  · Eat 3 meals and 1 or 2 snacks each day  Plan your meals in advance  Kari Blair and eat at home most of the time  Eat slowly  Do not skip meals  Skipping meals can lead to overeating later in the day  This can make it harder for you to lose weight  Talk with a dietitian to help you make a meal plan and schedule that is right for you  · Eat fruits and vegetables at every meal   They are low in calories and high in fiber, which makes you feel full  Do not add butter, margarine, or cream sauce to vegetables  Use herbs to season steamed vegetables  · Eat less fat and fewer fried foods  Eat more baked or grilled chicken and fish  These protein sources are lower in calories and fat than red meat  Limit fast food   Dress your salads with olive oil and vinegar instead of bottled dressing  · Limit the amount of sugar you eat  Do not drink sugary beverages  Limit alcohol  Activity changes:  Physical activity is good for your body in many ways  It helps you burn calories and build strong muscles  It decreases stress and depression, and improves your mood  It can also help you sleep better  Talk to your healthcare provider before you begin an exercise program   · Exercise for at least 30 minutes 5 days a week  Start slowly  Set aside time each day for physical activity that you enjoy and that is convenient for you  It is best to do both weight training and an activity that increases your heart rate, such as walking, bicycling, or swimming  · Find ways to be more active  Do yard work and housecleaning  Walk up the stairs instead of using elevators  Spend your leisure time going to events that require walking, such as outdoor festivals or fairs  This extra physical activity can help you lose weight and keep it off  Follow up with your healthcare provider as directed: You may need to meet with a dietitian  Write down your questions so you remember to ask them during your visits  © Copyright [address] [place] [address] Drive Information is for End User's use only and may not be sold, redistributed or otherwise used for commercial purposes  All illustrations and images included in CareNotes® are the copyrighted property of A D A M , Inc  or [facility] Sarah Coyle   The above information is an  only  It is not intended as medical advice for individual conditions or treatments  Talk to your doctor, nurse or pharmacist before following any medical regimen to see if it is safe and effective for you  Weight Management   AMBULATORY CARE:   Why it is important to manage your weight:  Being overweight increases your risk of health conditions such as heart disease, high blood pressure, type 2 diabetes, and certain types of cancer   It can also increase your risk for osteoarthritis, sleep apnea, and other respiratory problems  Aim for a slow, steady weight loss  Even a small amount of weight loss can lower your risk of health problems  How to lose weight safely:  A safe and healthy way to lose weight is to eat fewer calories and get regular exercise  · You can lose up about 1 pound a week by decreasing the number of calories you eat by 500 calories each day  You can decrease calories by eating smaller portion sizes or by cutting out high-calorie foods  Read labels to find out how many calories are in the foods you eat  · You can also burn calories with exercise such as walking, swimming, or biking  You will be more likely to keep weight off if you make these changes part of your lifestyle  Exercise at least 30 minutes per day on most days of the week  You can also fit in more physical activity by taking the stairs instead of the elevator or parking farther away from stores  Ask your healthcare provider about the best exercise plan for you  Healthy meal plan for weight management:  A healthy meal plan includes a variety of foods, contains fewer calories, and helps you stay healthy  A healthy meal plan includes the following:     · Eat whole-grain foods more often  A healthy meal plan should contain fiber  Fiber is the part of grains, fruits, and vegetables that is not broken down by your body  Whole-grain foods are healthy and provide extra fiber in your diet  Some examples of whole-grain foods are whole-wheat breads and pastas, oatmeal, brown rice, and bulgur  · Eat a variety of vegetables every day  Include dark, leafy greens such as spinach, kale, grace greens, and mustard greens  Eat yellow and orange vegetables such as carrots, sweet potatoes, and winter squash  · Eat a variety of fruits every day  Choose fresh or canned fruit (canned in its own juice or light syrup) instead of juice  Fruit juice has very little or no fiber      · Eat low-fat dairy foods  Drink fat-free (skim) milk or 1% milk  Eat fat-free yogurt and low-fat cottage cheese  Try low-fat cheeses such as mozzarella and other reduced-fat cheeses  · Choose meat and other protein foods that are low in fat  Choose beans or other legumes such as split peas or lentils  Choose fish, skinless poultry (chicken or turkey), or lean cuts of red meat (beef or pork)  Before you cook meat or poultry, cut off any visible fat  · Use less fat and oil  Try baking foods instead of frying them  Add less fat, such as margarine, sour cream, regular salad dressing and mayonnaise to foods  Eat fewer high-fat foods  Some examples of high-fat foods include french fries, doughnuts, ice cream, and cakes  · Eat fewer sweets  Limit foods and drinks that are high in sugar  This includes candy, cookies, regular soda, and sweetened drinks  Ways to decrease calories:   · Eat smaller portions  ? Use a small plate with smaller servings  ? Do not eat second helpings  ? When you eat at a restaurant, ask for a box and place half of your meal in the box before you eat  ? Share an entrée with someone else  · Replace high-calorie snacks with healthy, low-calorie snacks  ? Choose fresh fruit, vegetables, fat-free rice cakes, or air-popped popcorn instead of potato chips, nuts, or chocolate  ? Choose water or calorie-free drinks instead of soda or sweetened drinks  · Do not shop for groceries when you are hungry  You may be more likely to make unhealthy food choices  Take a grocery list of healthy foods and shop after you have eaten  · Eat regular meals  Do not skip meals  Skipping meals can lead to overeating later in the day  This can make it harder for you to lose weight  Eat a healthy snack in place of a meal if you do not have time to eat a regular meal  Talk with a dietitian to help you create a meal plan and schedule that is right for you      Other things to consider as you try to lose weight:   · Be aware of situations that may give you the urge to overeat, such as eating while watching television  Find ways to avoid these situations  For example, read a book, go for a walk, or do crafts  · Meet with a weight loss support group or friends who are also trying to lose weight  This may help you stay motivated to continue working on your weight loss goals  © Copyright [address] Information is for End User's use only and may not be sold, redistributed or otherwise used for commercial purposes  All illustrations and images included in CareNotes® are the copyrighted property of A D A M , Inc  or [facility] Sarah Coyle   The above information is an  only  It is not intended as medical advice for individual conditions or treatments  Talk to your doctor, nurse or pharmacist before following any medical regimen to see if it is safe and effective for you  Low Fat Diet   AMBULATORY CARE:   A low-fat diet  is an eating plan that is low in total fat, unhealthy fat, and cholesterol  You may need to follow a low-fat diet if you have trouble digesting or absorbing fat  You may also need to follow this diet if you have high cholesterol  You can also lower your cholesterol by increasing the amount of fiber in your diet  Soluble fiber is a type of fiber that helps to decrease cholesterol levels  Different types of fat in food:   · Limit unhealthy fats  A diet that is high in cholesterol, saturated fat, and trans fat may cause unhealthy cholesterol levels  Unhealthy cholesterol levels increase your risk of heart disease  ? Cholesterol:  Limit intake of cholesterol to less than 200 mg per day  Cholesterol is found in meat, eggs, and dairy  ? Saturated fat:  Limit saturated fat to less than 7% of your total daily calories  Ask your dietitian how many calories you need each day  Saturated fat is found in butter, cheese, ice cream, whole milk, and palm oil   Saturated fat is also found in meat, such as beef, pork, chicken skin, and processed meats  Processed meats include sausage, hot dogs, and bologna  ? Trans fat:  Avoid trans fat as much as possible  Trans fat is used in fried and baked foods  Foods that say trans fat free on the label may still have up to 0 5 grams of trans fat per serving  · Include healthy fats  Replace foods that are high in saturated and trans fat with foods high in healthy fats  This may help to decrease high cholesterol levels  ? Monounsaturated fats: These are found in avocados, nuts, and vegetable oils, such as olive, canola, and sunflower oil  ? Polyunsaturated fats: These can be found in vegetable oils, such as soybean or corn oil  Omega-3 fats can help to decrease the risk of heart disease  Omega-3 fats are found in fish, such as salmon, herring, trout, and tuna  Omega-3 fats can also be found in plant foods, such as walnuts, flaxseed, soybeans, and canola oil  Foods to limit or avoid:   · Grains:      ? Snacks that are made with partially hydrogenated oils, such as chips, regular crackers, and butter-flavored popcorn    ? High-fat baked goods, such as biscuits, croissants, doughnuts, pies, cookies, and pastries    · Dairy:      ? Whole milk, 2% milk, and yogurt and ice cream made with whole milk    ? Half and half creamer, heavy cream, and whipping cream    ? Cheese, cream cheese, and sour cream    · Meats and proteins:      ? High-fat cuts of meat (T-bone steak, regular hamburger, and ribs)    ? Fried meat, poultry (turkey and chicken), and fish    ? Poultry (chicken and turkey) with skin    ? Cold cuts (salami or bologna), hot dogs, young, and sausage    ? Whole eggs and egg yolks    · Vegetables and fruits with added fat:      ? Fried vegetables or vegetables in butter or high-fat sauces, such as cream or cheese sauces    ? Fried fruit or fruit served with butter or cream    · Fats:      ?  Butter, stick margarine, and shortening    ? Coconut, palm oil, and palm kernel oil    Foods to include:   · Grains:      ? Whole-grain breads, cereals, pasta, and brown rice    ? Low-fat crackers and pretzels    · Vegetables and fruits:      ? Fresh, frozen, or canned vegetables (no salt or low-sodium)    ? Fresh, frozen, dried, or canned fruit (canned in light syrup or fruit juice)    ? Avocado    · Low-fat dairy products:      ? Nonfat (skim) or 1% milk    ? Nonfat or low-fat cheese, yogurt, and cottage cheese    · Meats and proteins:      ? Chicken or turkey with no skin    ? Baked or broiled fish    ? Lean beef and pork (loin, round, extra lean hamburger)    ? Beans and peas, unsalted nuts, soy products    ? Egg whites and substitutes    ? Seeds and nuts    · Fats:      ? Unsaturated oil, such as canola, olive, peanut, soybean, or sunflower oil    ? Soft or liquid margarine and vegetable oil spread    ? Low-fat salad dressing    Other ways to decrease fat:   · Read food labels before you buy foods  Choose foods that have less than 30% of calories from fat  Choose low-fat or fat-free dairy products  Remember that fat free does not mean calorie free  These foods still contain calories, and too many calories can lead to weight gain  · Trim fat from meat and avoid fried food  Trim all visible fat from meat before you cook it  Remove the skin from poultry  Do not florez meat, fish, or poultry  Bake, roast, boil, or broil these foods instead  Avoid fried foods  Eat a baked potato instead of [place] fries  Steam vegetables instead of sautéing them in butter  · Add less fat to foods  Use imitation young bits on salads and baked potatoes instead of regular young bits  Use fat-free or low-fat salad dressings instead of regular dressings  Use low-fat or nonfat butter-flavored topping instead of regular butter or margarine on popcorn and other foods  Ways to decrease fat in recipes:  Replace high-fat ingredients with low-fat or nonfat ones  This may cause baked goods to be drier than usual  You may need to use nonfat cooking spray on pans to prevent food from sticking  You also may need to change the amount of other ingredients, such as water, in the recipe  Try the following:  · Use low-fat or light margarine instead of regular margarine or shortening  · Use lean ground turkey breast or chicken, or lean ground beef (less than 5% fat) instead of hamburger  · Add 1 teaspoon of canola oil to 8 ounces of skim milk instead of using cream or half and half  · Use grated zucchini, carrots, or apples in breads instead of coconut  · Use blenderized, low-fat cottage cheese, plain tofu, or low-fat ricotta cheese instead of cream cheese  · Use 1 egg white and 1 teaspoon of canola oil, or use ¼ cup (2 ounces) of fat-free egg substitute instead of a whole egg  · Replace half of the oil that is called for in a recipe with applesauce when you bake  Use 3 tablespoons of cocoa powder and 1 tablespoon of canola oil instead of a square of baking chocolate  How to increase fiber:  Eat enough high-fiber foods to get 20 to 30 grams of fiber every day  Slowly increase your fiber intake to avoid stomach cramps, gas, and other problems  · Eat 3 ounces of whole-grain foods each day  An ounce is about 1 slice of bread  Eat whole-grain breads, such as whole-wheat bread  Whole wheat, whole-wheat flour, or other whole grains should be listed as the first ingredient on the food label  Replace white flour with whole-grain flour or use half of each in recipes  Whole-grain flour is heavier than white flour, so you may have to add more yeast or baking powder  · Eat a high-fiber cereal for breakfast   Oatmeal is a good source of soluble fiber  Look for cereals that have bran or fiber in the name  Choose whole-grain products, such as brown rice, barley, and whole-wheat pasta  · Eat more beans, peas, and lentils    For example, add beans to soups or salads  Eat at least 5 cups of fruits and vegetables each day  Eat fruits and vegetables with the peel because the peel is high in fiber  © Copyright [address] Information is for End User's use only and may not be sold, redistributed or otherwise used for commercial purposes  All illustrations and images included in CareNotes® are the copyrighted property of A D A M , Inc  or [address] [place] [address]  The above information is an  only  It is not intended as medical advice for individual conditions or treatments  Talk to your doctor, nurse or pharmacist before following any medical regimen to see if it is safe and effective for you  Heart Healthy Diet   AMBULATORY CARE:   A heart healthy diet  is an eating plan low in unhealthy fats and sodium (salt)  The plan is high in healthy fats and fiber  A heart healthy diet helps improve your cholesterol levels and lowers your risk for heart disease and stroke  A dietitian will teach you how to read and understand food labels  Heart healthy diet guidelines to follow:   · Choose foods that contain healthy fats  ? Unsaturated fats  include monounsaturated and polyunsaturated fats  Unsaturated fat is found in foods such as soybean, canola, olive, corn, and safflower oils  It is also found in soft tub margarine that is made with liquid vegetable oil  ? Omega-3 fat  is found in certain fish, such as salmon, tuna, and trout, and in walnuts and flaxseed  Eat fish high in omega-3 fats at least 2 times a week  · Get 20 to 30 grams of fiber each day  Fruits, vegetables, whole-grain foods, and legumes (cooked beans) are good sources of fiber  · Limit or do not have unhealthy fats  ? Cholesterol  is found in animal foods, such as eggs and lobster, and in dairy products made from whole milk  Limit cholesterol to less than 200 mg each day  ? Saturated fat  is found in meats, such as young and hamburger   It is also found in chicken or turkey skin, whole milk, and butter  Limit saturated fat to less than 7% of your total daily calories  ? Trans fat  is found in packaged foods, such as potato chips and cookies  It is also in hard margarine, some fried foods, and shortening  Do not eat foods that contain trans fats  · Limit sodium as directed  You may be told to limit sodium to 2,000 to 2,300 mg each day  Choose low-sodium or no-salt-added foods  Add little or no salt to food you prepare  Use herbs and spices in place of salt  Include the following in your heart healthy plan:  Ask your dietitian or healthcare provider how many servings to have from each of the following food groups:  · Grains:      ? Whole-wheat breads, cereals, and pastas, and brown rice    ? Low-fat, low-sodium crackers and chips    · Vegetables:      ? Broccoli, green beans, green peas, and spinach    ? Collards, kale, and lima beans    ? Carrots, sweet potatoes, tomatoes, and peppers    ? Canned vegetables with no salt added    · Fruits:      ? Bananas, peaches, pears, and pineapple    ? Grapes, raisins, and dates    ? Oranges, tangerines, grapefruit, orange juice, and grapefruit juice    ? Apricots, mangoes, melons, and papaya    ? Raspberries and strawberries    ? Canned fruit with no added sugar    · Low-fat dairy:      ? Nonfat (skim) milk, 1% milk, and low-fat almond, cashew, or soy milks fortified with calcium    ? Low-fat cheese, regular or frozen yogurt, and cottage cheese    · Meats and proteins:      ? Lean cuts of beef and pork (loin, leg, round), skinless chicken and turkey    ? Legumes, soy products, egg whites, or nuts    Limit or do not include the following in your heart healthy plan:   · Unhealthy fats and oils:      ? Whole or 2% milk, cream cheese, sour cream, or cheese    ? High-fat cuts of beef (T-bone steaks, ribs), chicken or turkey with skin, and organ meats such as liver    ?  Butter, stick margarine, shortening, and cooking oils such as coconut or palm oil    · Foods and liquids high in sodium:      ? Packaged foods, such as frozen dinners, cookies, macaroni and cheese, and cereals with more than 300 mg of sodium per serving    ? Vegetables with added sodium, such as instant potatoes, vegetables with added sauces, or regular canned vegetables    ? Cured or smoked meats, such as hot dogs, young, and sausage    ? High-sodium ketchup, barbecue sauce, salad dressing, pickles, olives, soy sauce, or miso    · Foods and liquids high in sugar:      ? Candy, cake, cookies, pies, or doughnuts    ? Soft drinks (soda), sports drinks, or sweetened tea    ? Canned or dry mixes for cakes, soups, sauces, or gravies    Other healthy heart guidelines:   · Do not smoke  Nicotine and other chemicals in cigarettes and cigars can cause lung and heart damage  Ask your healthcare provider for information if you currently smoke and need help to quit  E-cigarettes or smokeless tobacco still contain nicotine  Talk to your healthcare provider before you use these products  · Limit or do not drink alcohol as directed  Alcohol can damage your heart and raise your blood pressure  Your healthcare provider may give you specific daily and weekly limits  The general recommended limit is 1 drink a day for women 21 or older and for men 72 or older  Do not have more than 3 drinks in a day or 7 in a week  The recommended limit is 2 drinks a day for men 24to 59years of age  Do not have more than 4 drinks in a day or 14 in a week  A drink of alcohol is 12 ounces of beer, 5 ounces of wine, or 1½ ounces of liquor  · Exercise regularly  Exercise can help you maintain a healthy weight and improve your blood pressure and cholesterol levels  Regular exercise can also decrease your risk for heart problems  Ask your healthcare provider about the best exercise plan for you  Do not start an exercise program without asking your healthcare provider         Follow up with your doctor or cardiologist as directed:  Write down your questions so you remember to ask them during your visits  © Copyright [address] Information is for End User's use only and may not be sold, redistributed or otherwise used for commercial purposes  All illustrations and images included in CareNotes® are the copyrighted property of [employer] M , Inc  or Erin Coyle   The above information is an  only  It is not intended as medical advice for individual conditions or treatments  Talk to your doctor, nurse or pharmacist before following any medical regimen to see if it is safe and effective for you  Calorie Counting Diet   WHAT YOU NEED TO KNOW:   What is a calorie counting diet? It is a meal plan based on counting calories each day to reach a healthy body weight  You will need to eat fewer calories if you are trying to lose weight  Weight loss may decrease your risk for certain health problems or improve your health if you have health problems  Some of these health problems include heart disease, high blood pressure, and diabetes  What foods should I avoid? Your dietitian will tell you if you need to avoid certain foods based on your body weight and health condition  You may need to avoid high-fat foods if you are at risk for or have heart disease  You may need to eat fewer foods from the breads and starches food group if you have diabetes  How many calories are in foods? The following is a list of foods and drinks with the approximate number of calories in each  Check the food label to find the exact number of calories  A dietitian can tell you how many calories you should have from each food group each day  · Carbohydrate:      ? ½ of a 3-inch bagel, 1 slice of bread, or ½ of a hamburger bun or hot dog bun (80)    ? 1 (8-inch) flour tortilla or ½ cup of cooked rice (100)    ? 1 (6-inch) corn tortilla (80)    ? 1 (6-inch) pancake or 1 cup of bran flakes cereal (110)    ?  ½ cup of cooked cereal (80)    ? ½ cup of cooked pasta (85)    ? 1 ounce of pretzels (100)    ? 3 cups of air-popped popcorn without butter or oil (80)    · Dairy:      ? 1 cup of skim or 1% milk (90)    ? 1 cup of 2% milk (120)    ? 1 cup of whole milk (160)    ? 1 cup of 2% chocolate milk (220)    ? 1 ounce of low-fat cheese with 3 grams of fat per ounce (70)    ? 1 ounce of cheddar cheese (114)    ? ½ cup of 1% fat cottage cheese (80)    ? 1 cup of plain or sugar-free, fat-free yogurt (90)    · Protein foods:      ? 3 ounces of fish (not breaded or fried) (95)    ? 3 ounces of breaded, fried fish (195)    ? ¾ cup of tuna canned in water (105)    ? 3 ounces of chicken breast without skin (105)    ? 1 fried chicken breast with skin (350)    ? ¼ cup of fat free egg substitute (40)    ? 1 large egg (75)    ? 3 ounces of lean beef or pork (165)    ? 3 ounces of fried pork chop or ham (185)    ? ½ cup of cooked dried beans, such as kidney, langston, lentils, or navy (115)    ? 3 ounces of bologna or lunch meat (225)    ? 2 links of breakfast sausage (140)    · Vegetables:      ? ½ cup of sliced mushrooms (10)    ? 1 cup of salad greens, such as lettuce, spinach, or paul (15)    ? ½ cup of steamed asparagus (20)    ? ½ cup of cooked summer squash, zucchini squash, or green or wax beans (25)    ? 1 cup of broccoli or cauliflower florets, or 1 medium tomato (25)    ? 1 large raw carrot or ½ cup of cooked carrots (40)    ? ? of a medium cucumber or 1 stalk of celery (5)    ? 1 small baked potato (160)    ? 1 cup of breaded, fried vegetables (230)    · Fruit:      ? 1 (6-inch) banana (55)     ? ½ of a 4-inch grapefruit (55)    ? 15 grapes (60)    ? 1 medium orange or apple (70)    ? 1 large peach (65)    ? 1 cup of fresh pineapple chunks (75)    ? 1 cup of melon cubes (50)    ? 1¼ cups of whole strawberries (45)    ? ½ cup of fruit canned in juice (55)    ? ½ cup of fruit canned in heavy syrup (110)    ? ? cup of raisins (130)    ?  ½ cup of unsweetened fruit juice (60)    ? ½ cup of grape, cranberry, or prune juice (90)    · Fat:      ? 10 peanuts or 2 teaspoons of peanut butter (55)    ? 2 tablespoons of avocado or 1 tablespoon of regular salad dressing (45)    ? 2 slices of young (90)    ? 1 teaspoon of oil, such as safflower, canola, corn, or olive oil (45)    ? 2 teaspoons of low-fat margarine, or 1 tablespoon of low-fat mayonnaise (50)    ? 1 teaspoon of regular margarine (40)    ? 1 tablespoon of regular mayonnaise (135)    ? 1 tablespoon of cream cheese or 2 tablespoons of low-fat cream cheese (45)    ? 2 tablespoons of vegetable shortening (215)    · Dessert and sweets:      ? 8 animal crackers or 5 vanilla wafers (80)    ? 1 frozen fruit juice bar (80)    ? ½ cup of ice milk or low-fat frozen yogurt (90)    ? ½ cup of sherbet or sorbet (125)    ? ½ cup of sugar-free pudding or custard (60)    ? ½ cup of ice cream (140)    ? ½ cup of pudding or custard (175)    ? 1 (2-inch) square chocolate brownie (185)    · Combination foods:      ? Bean burrito made with an 8-inch tortilla, without cheese (275)    ? Chicken breast sandwich with lettuce and tomato (325)    ? 1 cup of chicken noodle soup (60)    ? 1 beef taco (175)    ? Regular hamburger with lettuce and tomato (310)    ? Regular cheeseburger with lettuce and tomato (410)     ? ¼ of a 12-inch cheese pizza (280)    ? Fried fish sandwich with lettuce and tomato (425)    ? Hot dog and bun (275)    ? 1½ cups of macaroni and cheese (310)    ? Taco salad with a fried tortilla shell (870)    · Low-calorie foods:      ? 1 tablespoon of ketchup or 1 tablespoon of fat free sour cream (15)    ? 1 teaspoon of mustard (5)    ? ¼ cup of salsa (20)    ? 1 large dill pickle (15)    ? 1 tablespoon of fat free salad dressing (10)    ? 2 teaspoons of low-sugar, light jam or jelly, or 1 tablespoon of sugar-free syrup (15)    ? 1 sugar-free popsicle (15)    ?  1 cup of club soda, seltzer water, or diet soda (0)    CARE AGREEMENT:   You have the right to help plan your care  Discuss treatment options with your healthcare provider to decide what care you want to receive  You always have the right to refuse treatment  The above information is an  only  It is not intended as medical advice for individual conditions or treatments  Talk to your doctor, nurse or pharmacist before following any medical regimen to see if it is safe and effective for you  © Copyright [address] Information is for End User's use only and may not be sold, redistributed or otherwise used for commercial purposes   All illustrations and images included in CareNotes® are the copyrighted property of [employer]  or [address] [place] [address]

## 2021-06-02 NOTE — PROGRESS NOTES
Assessment/Plan:    No problem-specific Assessment & Plan notes found for this encounter  Diagnoses and all orders for this visit:    Breast mass in male  -     US breast left limited (diagnostic); Future  -     US breast right limited (diagnostic); Future    Episode of recurrent major depressive disorder, unspecified depression episode severity (HCC)  -     buPROPion (WELLBUTRIN XL) 150 mg 24 hr tablet; Take 1 tablet (150 mg total) by mouth every morning    BMI 38 0-38 9,adult    Exercise counseling    Dietary counseling        Patient on spironolactone  12 5 mg p o  q d  per Cardiology for heart failure  May be cause of bilateral breast masses  Advised patient to return to Cardiology and make cardiologist aware  Bilateral breast ultrasound ordered  Patient has family history of breast, colon, and lung cancer in mother and lung cancer in father  Resumed Wellbutrin 150 mg p o  q d  daily  Will continue to follow  Patient previously on medication about 10 years ago  BMI plan with patient reviewed  Patient believes Wellbutrin will help his mood and provide motivation for weight loss  Exercise and dietary lifestyle modification counseling provided  Follow-up in 1 month for yearly physical and monitoring of issues  Subjective:      Patient ID: Miguel Angel Espinal is a 45 y o  male  PMH of PARKER, CAD, and depression presenting with bilateral breast mass, one on each side  Both are underneath the nipple  First noted right sided mass 3 months ago  Right mass is about the same  Patient first noticed left mass about a month ago  Feels like it's getting bigger  Both are painful to touch  No discharge from nipple  No trauma to the breasts  Patient also feel depression is returning  Last on medication in 2010  Was on Wellbutrin for about 3 years  Lost his insurance and has been off medication since  PHQ score is 10            Review of Systems   Constitutional: Negative for chills and fever    HENT: Negative for sore throat and trouble swallowing  Gastrointestinal: Negative for blood in stool, constipation, diarrhea, nausea and vomiting  Genitourinary: Negative for dysuria and hematuria  Skin: Negative for rash  Neurological: Negative for dizziness, weakness, light-headedness and headaches  Objective:      /80 (BP Location: Left arm, Patient Position: Sitting, Cuff Size: Standard)   Pulse 88   Temp (!) 96 2 °F (35 7 °C) (Tympanic)   Resp 18   Ht 5' 10" (1 778 m)   Wt 120 kg (265 lb)   SpO2 95%   BMI 38 02 kg/m²          Physical Exam  Cardiovascular:      Rate and Rhythm: Normal rate and regular rhythm  Heart sounds: Normal heart sounds  No murmur  Pulmonary:      Effort: Pulmonary effort is normal  No respiratory distress  Breath sounds: Normal breath sounds  No wheezing  Chest:      Breasts:         Right: Mass (5 mm nonfluctant mass behind areola), skin change (skin tag noted on areola) and tenderness present  No bleeding  Left: Mass (2 to 3 mm mass behind areola) and tenderness present  No bleeding, nipple discharge or skin change  Abdominal:      General: Bowel sounds are normal       Palpations: Abdomen is soft  Tenderness: There is no abdominal tenderness  BMI Counseling: Body mass index is 38 02 kg/m²  The BMI is above normal  Exercise recommendations include moderate aerobic physical activity for 150 minutes/week and exercising 3-5 times per week

## 2021-06-07 ENCOUNTER — IMMUNIZATIONS (OUTPATIENT)
Dept: FAMILY MEDICINE CLINIC | Facility: HOSPITAL | Age: 51
End: 2021-06-07

## 2021-06-07 DIAGNOSIS — Z23 ENCOUNTER FOR IMMUNIZATION: Primary | ICD-10-CM

## 2021-06-07 PROCEDURE — 91301 SARS-COV-2 / COVID-19 MRNA VACCINE (MODERNA) 100 MCG: CPT

## 2021-06-07 PROCEDURE — 0012A SARS-COV-2 / COVID-19 MRNA VACCINE (MODERNA) 100 MCG: CPT

## 2021-06-10 DIAGNOSIS — I42.9 CARDIOMYOPATHY (HCC): ICD-10-CM

## 2021-06-10 RX ORDER — ATORVASTATIN CALCIUM 40 MG/1
40 TABLET, FILM COATED ORAL
Qty: 30 TABLET | Refills: 0 | Status: SHIPPED | OUTPATIENT
Start: 2021-06-10 | End: 2021-07-21 | Stop reason: SDUPTHER

## 2021-06-10 NOTE — TELEPHONE ENCOUNTER
NEED REFILLS OF THE FOLLOWING MEDS    atorvastatin (LIPITOR) 40 mg tablet    [facility] 90-DAY SUPPLY     HAS APPT FOR: C7/27/21

## 2021-07-21 DIAGNOSIS — I42.9 CARDIOMYOPATHY (HCC): ICD-10-CM

## 2021-07-21 RX ORDER — ATORVASTATIN CALCIUM 40 MG/1
40 TABLET, FILM COATED ORAL
Qty: 90 TABLET | Refills: 3 | Status: SHIPPED | OUTPATIENT
Start: 2021-07-21 | End: 2021-07-27 | Stop reason: SDUPTHER

## 2021-07-27 ENCOUNTER — TELEPHONE (OUTPATIENT)
Dept: CARDIOLOGY CLINIC | Facility: CLINIC | Age: 51
End: 2021-07-27

## 2021-07-27 ENCOUNTER — OFFICE VISIT (OUTPATIENT)
Dept: CARDIOLOGY CLINIC | Facility: CLINIC | Age: 51
End: 2021-07-27
Payer: COMMERCIAL

## 2021-07-27 VITALS
BODY MASS INDEX: 37.8 KG/M2 | SYSTOLIC BLOOD PRESSURE: 124 MMHG | HEART RATE: 82 BPM | DIASTOLIC BLOOD PRESSURE: 88 MMHG | OXYGEN SATURATION: 95 % | HEIGHT: 70 IN | TEMPERATURE: 97.1 F | WEIGHT: 264 LBS

## 2021-07-27 DIAGNOSIS — I27.21 SECONDARY PULMONARY ARTERIAL HYPERTENSION (HCC): ICD-10-CM

## 2021-07-27 DIAGNOSIS — R79.89 ABNORMAL TSH: ICD-10-CM

## 2021-07-27 DIAGNOSIS — I42.9 CARDIOMYOPATHY (HCC): ICD-10-CM

## 2021-07-27 DIAGNOSIS — I50.43 ACUTE ON CHRONIC COMBINED SYSTOLIC AND DIASTOLIC CHF (CONGESTIVE HEART FAILURE) (HCC): ICD-10-CM

## 2021-07-27 DIAGNOSIS — I10 UNCONTROLLED HYPERTENSION: ICD-10-CM

## 2021-07-27 DIAGNOSIS — Z72.89 ALCOHOL USE: ICD-10-CM

## 2021-07-27 DIAGNOSIS — G47.33 OSA (OBSTRUCTIVE SLEEP APNEA): ICD-10-CM

## 2021-07-27 DIAGNOSIS — I42.0 DILATED CARDIOMYOPATHY (HCC): ICD-10-CM

## 2021-07-27 DIAGNOSIS — I25.10 CORONARY ARTERY DISEASE INVOLVING NATIVE CORONARY ARTERY OF NATIVE HEART WITHOUT ANGINA PECTORIS: ICD-10-CM

## 2021-07-27 DIAGNOSIS — I50.42 CHRONIC COMBINED SYSTOLIC AND DIASTOLIC CONGESTIVE HEART FAILURE (HCC): ICD-10-CM

## 2021-07-27 LAB
ALBUMIN SERPL-MCNC: 4.7 G/DL (ref 3.8–4.9)
ALBUMIN/GLOB SERPL: 2 {RATIO} (ref 1.2–2.2)
ALP SERPL-CCNC: 71 IU/L (ref 48–121)
ALT SERPL-CCNC: 20 IU/L (ref 0–44)
AST SERPL-CCNC: 23 IU/L (ref 0–40)
BASOPHILS # BLD AUTO: 0.1 X10E3/UL (ref 0–0.2)
BASOPHILS NFR BLD AUTO: 1 %
BILIRUB SERPL-MCNC: 0.3 MG/DL (ref 0–1.2)
BUN SERPL-MCNC: 27 MG/DL (ref 6–24)
BUN/CREAT SERPL: 20 (ref 9–20)
CALCIUM SERPL-MCNC: 9.6 MG/DL (ref 8.7–10.2)
CHLORIDE SERPL-SCNC: 103 MMOL/L (ref 96–106)
CHOLEST SERPL-MCNC: 194 MG/DL (ref 100–199)
CHOLEST/HDLC SERPL: 4.4 RATIO (ref 0–5)
CO2 SERPL-SCNC: 22 MMOL/L (ref 20–29)
CREAT SERPL-MCNC: 1.37 MG/DL (ref 0.76–1.27)
EOSINOPHIL # BLD AUTO: 0.3 X10E3/UL (ref 0–0.4)
EOSINOPHIL NFR BLD AUTO: 3 %
ERYTHROCYTE [DISTWIDTH] IN BLOOD BY AUTOMATED COUNT: 13.4 % (ref 11.6–15.4)
GLOBULIN SER-MCNC: 2.3 G/DL (ref 1.5–4.5)
GLUCOSE SERPL-MCNC: 100 MG/DL (ref 65–99)
HCT VFR BLD AUTO: 43.6 % (ref 37.5–51)
HDLC SERPL-MCNC: 44 MG/DL
HGB BLD-MCNC: 14.6 G/DL (ref 13–17.7)
IMM GRANULOCYTES # BLD: 0.1 X10E3/UL (ref 0–0.1)
IMM GRANULOCYTES NFR BLD: 1 %
LDLC SERPL CALC-MCNC: 70 MG/DL (ref 0–99)
LDLC SERPL DIRECT ASSAY-MCNC: 76 MG/DL (ref 0–99)
LYMPHOCYTES # BLD AUTO: 3.1 X10E3/UL (ref 0.7–3.1)
LYMPHOCYTES NFR BLD AUTO: 31 %
MAGNESIUM SERPL-MCNC: 2.1 MG/DL (ref 1.6–2.3)
MCH RBC QN AUTO: 30.1 PG (ref 26.6–33)
MCHC RBC AUTO-ENTMCNC: 33.5 G/DL (ref 31.5–35.7)
MCV RBC AUTO: 90 FL (ref 79–97)
MONOCYTES # BLD AUTO: 1 X10E3/UL (ref 0.1–0.9)
MONOCYTES NFR BLD AUTO: 11 %
NEUTROPHILS # BLD AUTO: 5.2 X10E3/UL (ref 1.4–7)
NEUTROPHILS NFR BLD AUTO: 53 %
PLATELET # BLD AUTO: 241 X10E3/UL (ref 150–450)
POTASSIUM SERPL-SCNC: 4.8 MMOL/L (ref 3.5–5.2)
PROT SERPL-MCNC: 7 G/DL (ref 6–8.5)
RBC # BLD AUTO: 4.85 X10E6/UL (ref 4.14–5.8)
SL AMB EGFR AFRICAN AMERICAN: 68 ML/MIN/1.73
SL AMB EGFR NON AFRICAN AMERICAN: 59 ML/MIN/1.73
SL AMB VLDL CHOLESTEROL CALC: 80 MG/DL (ref 5–40)
SODIUM SERPL-SCNC: 140 MMOL/L (ref 134–144)
TRIGL SERPL-MCNC: 513 MG/DL (ref 0–149)
TSH SERPL DL<=0.005 MIU/L-ACNC: 10.1 UIU/ML (ref 0.45–4.5)
WBC # BLD AUTO: 9.7 X10E3/UL (ref 3.4–10.8)

## 2021-07-27 PROCEDURE — 1036F TOBACCO NON-USER: CPT | Performed by: INTERNAL MEDICINE

## 2021-07-27 PROCEDURE — 99214 OFFICE O/P EST MOD 30 MIN: CPT | Performed by: INTERNAL MEDICINE

## 2021-07-27 PROCEDURE — 3074F SYST BP LT 130 MM HG: CPT | Performed by: INTERNAL MEDICINE

## 2021-07-27 PROCEDURE — 3079F DIAST BP 80-89 MM HG: CPT | Performed by: INTERNAL MEDICINE

## 2021-07-27 PROCEDURE — 3008F BODY MASS INDEX DOCD: CPT | Performed by: INTERNAL MEDICINE

## 2021-07-27 PROCEDURE — 93000 ELECTROCARDIOGRAM COMPLETE: CPT | Performed by: INTERNAL MEDICINE

## 2021-07-27 RX ORDER — SPIRONOLACTONE 25 MG/1
12.5 TABLET ORAL EVERY OTHER DAY
Qty: 45 TABLET | Refills: 1 | Status: SHIPPED | OUTPATIENT
Start: 2021-07-27 | End: 2021-10-07 | Stop reason: SDUPTHER

## 2021-07-27 RX ORDER — ATORVASTATIN CALCIUM 40 MG/1
40 TABLET, FILM COATED ORAL
Qty: 90 TABLET | Refills: 3 | Status: SHIPPED | OUTPATIENT
Start: 2021-07-27 | End: 2022-08-02 | Stop reason: SDUPTHER

## 2021-07-27 NOTE — TELEPHONE ENCOUNTER
----- Message from Verona Stanford MD sent at 7/27/2021  9:54 AM EDT -----    He also need to cut back his alcohol drinking triglyceride may be high secondary to above  Magnesium is acceptable  He will need thyroid medication like levothyroxine but he need to call his primary care doctor

## 2021-07-27 NOTE — TELEPHONE ENCOUNTER
Spoke with patient, message given per Dr Joshua Centeno  Patient verbalized understanding  Patient coming in today for appointment and will discuss labs with dr Joshua Centeno at that time

## 2021-07-27 NOTE — PROGRESS NOTES
Progress Note - Cardiology Office  [facility]    Winnie Almonte 45 y o  male MRN: [id number]  : 1970  Encounter: [id number]      Assessment:     1  Chronic combined systolic and diastolic congestive heart failure ([facility] )    2  Dilated cardiomyopathy ([place] [address] )    3  Coronary artery disease involving native coronary artery of native heart without angina pectoris    4  PARKER (obstructive sleep apnea)    5  Secondary pulmonary arterial hypertension ([place] [address] )    6  Uncontrolled hypertension    7  Alcohol use        Discussion Summary and Plan:  1  Combined systolic and diastolic heart failure with dilated cardiomyopathy  Patient EF was found to be 25- 30%  Will optimize heart failure medications  Increase lisinopril to 20 mg daily  Continue Coreg, Aldactone, aspirin and statins  Patient has some exertional shortness of breath  He has gained about 50 lb weight since his discharge  His labs reviewed BUN creatinine slightly elevated will take Aldactone every other day otherwise no evidence of any volume overload  2  Nonischemic cardiomyopathy  Most likely etiology longstanding uncontrolled hypertension in the setting of LBBB and alcohol abuse  Cardiac catheterization now evidence of obstructive CAD  Continue medical Rx encouraged him to lose weight     3  CKD with history of kidney injury  Other electrolytes are acceptable BUN creatinine slightly elevated  Aldactone changed to every other day  Encouraged him to drink adequate water      4  Dyspnea on exertion  Chronic most likely related to obesity  He used to be heavy smoker  Advised him to lose weight      5  Chronic hypoxic respiratory failure  On oxygen therapy at nighttime  Could not tolerate CPAP machine     6  Dyslipidemia on statin  His repeat blood test showed triglyceride levels very high  He admitted he was not taking his night medication all the time  But also he is drinking and he is hypothyroidism    His triglyceride levels are around 500  Prescription renewed if remains elevated need to be on fenofibrate at some point  Will avoid at this time due to his history of alcohol use      7  Previous history of alcohol abuse and tobacco abuse  He still drinks  Advised him to stop drinking otherwise high risk for development and worsening of cardiomyopathy  This was discussed with him again    8  Obesity with BMI 38  Encouraged him to lose weight    9  Abnormal TSH  Advised him to contact his primary care doctor will check TSH and free T3 and free T4  He will make contact with his primary care doctor  He may need to be on levothyroxine  Continue current Rx follow-up 4 to 6 months  Please call [phone] if any questions  Counseling :  A description of the counseling  Goals and Barriers  Patient's ability to self care: Yes  Medication side effect reviewed with patient in detail and all their questions answered to their satisfaction  HPI :     Karen Garcia is a 45y o  year old male who came for follow up  Patient was recently admitted to [facility] with shortness of breath  He was diagnosed to have pulmonary edema later non workup shows that he had cardiomyopathy with EF around 20-30% with paradoxical septal motion secondary to his LBBB  During workup of also found to have hypertension, LBBB, history of emphysema/COPD  He used to smoke heavily quit 6 years ago  He used to drink also now he drinks occasionally  He underwent cardiac catheterization found to have mild nonobstructive disease and was started on medical therapy  He has a defibrillator vest   No ICD shocks  Since hospitalization he has lost about 30 lb  He is breathing much better there is no leg edema no nausea no vomiting no other issues  He feels his doing well much better than before  02/11/2021  Above reviewed  Patient came for follow-up  He had history of cardiomyopathy with EF around 30% which has improved to about 55%    He had LBBB at that time  Now QRS is narrow  He had history of COPD/emphysema, sleep apnea obesity with BMI now around 37  He has gained about 50 lb since discharge from the hospital and he has no leg edema  He did feel some exertional shortness of breath  He says he gets very depressed and sad as he has lost friends and he start eating  He is breathing still much it he bed working he works in a bar and he drinks some alcohol but not as as he used to drink in the past   No other issues at this time  He does not smoke  He had history of sleep apnea but he could not use CPAP  In the hospitalization after discharge for some time he lost about 30 lb he has gained all his weight back  07/27/2021  Above reviewed  Patient came for follow-up  He had history of cardiomyopathy with EF at time used to be 30% which has improved to 55%  He had LBBB at that time  His QRS is now narrow  He does have history of COPD, emphysema, sleep apnea and BMI still remains around 38  He has gained his weight back  He denies any chest pain any shortness of breath  He admits he does not take his medication at particularly cholesterol the time he misses some does he is back to drinking but not same amount  He has recent blood test done which shows his thyroid function is abnormal and his triglyceride is significantly elevated and his cholesterol has also increased  I believe he is not taking his Lipitor as prescribed and he may have hypothyroidism  History of sleep apnea but could not use CPAP machine  No nausea no vomiting no other issues at this time  His labs reviewed with him  Review of Systems   Constitutional: Negative for activity change, chills, diaphoresis, fever and unexpected weight change  HENT: Negative for congestion  Eyes: Negative for discharge and redness  Respiratory: Negative for cough, chest tightness, shortness of breath and wheezing  Cardiovascular: Negative    Negative for chest pain, palpitations and leg swelling  Gastrointestinal: Negative for abdominal pain, diarrhea and nausea  Endocrine: Negative  Genitourinary: Negative for decreased urine volume and urgency  Musculoskeletal: Negative  Negative for arthralgias, back pain and gait problem  Skin: Negative for rash and wound  Allergic/Immunologic: Negative  Neurological: Negative for dizziness, seizures, syncope, weakness, light-headedness and headaches  Hematological: Negative  Psychiatric/Behavioral: Negative for agitation and confusion  The patient is not nervous/anxious  Historical Information   Past Medical History:   Diagnosis Date    Acute hypoxemic respiratory failure ([place] [address] )     Anxiety     Cardiomyopathy ([place] [address] )     Centrilobular emphysema ([place] [address] ) 2020    CH[facility]estive [facility] failure[facility])     55% no pacer needed    Combined systolic and diastolic cardiac dysfunction     Coronary artery disease     Depression     Hypertension     Mixed sleep apnea     Obesity     Secondary pulmonary arterial hypertension ([place] [address] )     Substance abuse ([place] [address] )     marijuana cookie some time for sleeping       Volume overload      Past Surgical History:   Procedure Laterality Date    CHOLECYSTECTOMY      FRACTURE SURGERY      jaw    KNEE SURGERY      MANDIBLE SURGERY      TONSILLECTOMY       Social History     Substance and Sexual Activity   Alcohol Use Yes    Alcohol/week: 2 0 standard drinks    Types: 2 Cans of beer per week    Comment: Last drink      Social History     Substance and Sexual Activity   Drug Use Yes    Types: Marijuana    Comment: pot cookie once in a while for sleeping      Social History     Tobacco Use   Smoking Status Former Smoker    Packs/day:     Years: 25 00    Pack years: 25     Types: Cigarettes    Quit date: 2014    Years since quittin 2   Smokeless Tobacco Never Used     Family History:   Family History   Problem Relation Age of Onset    Heart disease Father     Cancer Father     Cancer Mother     Colon cancer Mother     No Known Problems Sister        Meds/Allergies     Allergies   Allergen Reactions    Cheese - Food Allergy      Fever, nasal congestion    Chocolate - Food Allergy      Fever, nasal congestion    Nuts - Food Allergy      Fever, nasal congestion       Current Outpatient Medications:     acetaminophen (TYLENOL) 325 mg tablet, Take 650 mg by mouth every 6 (six) hours as needed for mild pain or fever, Disp: , Rfl:     aspirin 81 mg chewable tablet, Chew 1 tablet (81 mg total) daily, Disp: 30 tablet, Rfl: 0    atorvastatin (LIPITOR) 40 mg tablet, Take 1 tablet (40 mg total) by mouth daily with dinner, Disp: 90 tablet, Rfl: 3    buPROPion (WELLBUTRIN XL) 150 mg 24 hr tablet, Take 1 tablet (150 mg total) by mouth every morning, Disp: 60 tablet, Rfl: 1    carvedilol (COREG) 25 mg tablet, Take 1 tablet (25 mg total) by mouth 2 (two) times a day with meals, Disp: 60 tablet, Rfl: 11    furosemide (LASIX) 20 mg tablet, Take 1 tablet (20 mg total) by mouth daily, Disp: 30 tablet, Rfl: 5    lisinopril (ZESTRIL) 20 mg tablet, Take 1 tablet (20 mg total) by mouth daily, Disp: 90 tablet, Rfl: 1    Omeprazole-Sodium Bicarbonate (ZEGERID OTC PO), Take by mouth daily, Disp: , Rfl:     spironolactone (ALDACTONE) 25 mg tablet, Take 0 5 tablets (12 5 mg total) by mouth daily, Disp: 90 tablet, Rfl: 1    umeclidinium bromide (INCRUSE ELLIPTA) 62 5 mcg/inh AEPB inhaler, Inhale 1 puff daily, Disp: 1 Inhaler, Rfl: 0    albuterol (ProAir HFA) 90 mcg/act inhaler, Inhale 2 puffs every 4 (four) hours as needed for wheezing or shortness of breath (Patient not taking: Reported on 6/2/2021), Disp: 8 5 g, Rfl: 0    famotidine (PEPCID) 20 mg tablet, Take 20 mg by mouth 2 (two) times a day (Patient not taking: Reported on 7/27/2021), Disp: , Rfl:     multivitamin-minerals (CENTRUM ADULTS) tablet, Take 1 tablet by mouth daily (Patient not taking: Reported on 4/5/2021), Disp: 30 tablet, Rfl: 0    Vitals: Blood pressure 124/88, pulse 82, temperature (!) 97 1 °F (36 2 °C), height 5' 10" (1 778 m), weight 120 kg (264 lb), SpO2 95 %  Body mass index is 37 88 kg/m²  Vitals:    07/27/21 1145   Weight: 120 kg (264 lb)     BP Readings from Last 3 Encounters:   07/27/21 124/88   06/02/21 126/80   04/05/21 (!) 188/102       Physical Exam  Constitutional:       General: He is not in acute distress  Appearance: He is well-developed  He is not diaphoretic  HENT:      Head: Normocephalic and atraumatic  Eyes:      Pupils: Pupils are equal, round, and reactive to light  Neck:      Thyroid: No thyromegaly  Vascular: No JVD  Trachea: No tracheal deviation  Cardiovascular:      Rate and Rhythm: Normal rate and regular rhythm  Heart sounds: S1 normal and S2 normal  Heart sounds not distant  Murmur heard  Systolic (ejection) murmur is present with a grade of 2/6  No friction rub  No gallop  No S3 or S4 sounds  Pulmonary:      Effort: Pulmonary effort is normal  No respiratory distress  Breath sounds: No wheezing or rales  Comments: Bilateral entry with prolonged expiratory phase  Chest:      Chest wall: No tenderness  Abdominal:      General: Bowel sounds are normal  There is no distension  Palpations: Abdomen is soft  Tenderness: There is no abdominal tenderness  Musculoskeletal:         General: No deformity  Cervical back: Neck supple  Comments: No edema   Skin:     General: Skin is warm and dry  Coloration: Skin is not pale  Findings: No rash  Neurological:      Mental Status: He is alert and oriented to person, place, and time  Psychiatric:         Behavior: Behavior normal          Judgment: Judgment normal          Diagnostic Studies Review Cardio:  Cardiac catheterization and echo report reviewed      EKG:  Patient has history of LBBB    Twelve lead EKG 02/11/2021 shows normal sinus rhythm heart rate 79 beats per minute QTC mildly prolonged  Twelve lead EKG 2020 shows normal sinus rhythm heart rate 82 beats per minute no change from previous EKG  milliseconds  Cardiac testing:   Results for orders placed during the hospital encounter of 20   Echo complete with contrast if indicated    Narrative [address]  [address] [facility] [place] [address]  [phone]    Transthoracic Echocardiogram  2D, M-mode, Doppler, and Color Doppler    Study date:  02-May-2020    Patient: Lieutenant Fraga  MR number: [id number]  Account number: [de-identified]  : 1970  Age: 45 years  Gender: Male  Status: Inpatient  Location: Bedside  Height: 70 in  Weight: 270 4 lb  BP: 153/ 96 mmHg    Indications: SOB    Diagnoses: I50 9 - Heart failure, unspecified    Sonographer:  PRAMOD Turner  Referring Physician:  BRISEIDA Toledo  Group:  [facility]  Interpreting Physician:  Portillo Lozada MD    SUMMARY    LEFT VENTRICLE:  The ventricle was mildly dilated  Systolic function was severely reduced  Ejection fraction was estimated in the range of 20 % to 30 %  There was severe diffuse hypokinesis  Wall thickness was mildly increased  There was mild concentric hypertrophy  Doppler parameters were consistent with a reversible restrictive pattern, indicative of decreased left ventricular diastolic compliance and/or increased left atrial pressure (grade 3 diastolic dysfunction)  VENTRICULAR SEPTUM:  There was moderate paradoxical motion  These changes are consistent with LBBB  RIGHT VENTRICLE:  The ventricle was mildly dilated  Systolic function was mildly reduced  LEFT ATRIUM:  The atrium was moderately dilated  RIGHT ATRIUM:  The atrium was mildly dilated  MITRAL VALVE:  There was mild regurgitation  TRICUSPID VALVE:  There was mild to moderate regurgitation  Estimated peak PA pressure was 45 to 50 mmHg      IVC, HEPATIC VEINS:  The inferior vena cava was mildly dilated  The respirophasic change in diameter was more than 50%  HISTORY: PRIOR HISTORY: HTN,Acute kidney injury,pulmonary edema,esophageal reflux,Luetscher's syndrome,panic disorder,seborrhea  PROCEDURE: The procedure was performed at the bedside  This was a routine study  The transthoracic approach was used  The study included complete 2D imaging, M-mode, complete spectral Doppler, and color Doppler  The heart rate was 95 bpm,  at the start of the study  Images were obtained from the parasternal, apical, subcostal, and suprasternal notch acoustic windows  Image quality was adequate  LEFT VENTRICLE: The ventricle was mildly dilated  Systolic function was severely reduced  Ejection fraction was estimated in the range of 20 % to 30 %  There was severe diffuse hypokinesis  Wall thickness was mildly increased  There was  mild concentric hypertrophy  DOPPLER: Doppler parameters were consistent with a reversible restrictive pattern, indicative of decreased left ventricular diastolic compliance and/or increased left atrial pressure (grade 3 diastolic  dysfunction)  VENTRICULAR SEPTUM: There was moderate paradoxical motion  These changes are consistent with LBBB  RIGHT VENTRICLE: The ventricle was mildly dilated  Systolic function was mildly reduced  LEFT ATRIUM: The atrium was moderately dilated  RIGHT ATRIUM: The atrium was mildly dilated  MITRAL VALVE: There was normal leaflet separation  DOPPLER: Transmitral velocity was minimally increased  There was no evidence for stenosis  There was mild regurgitation  AORTIC VALVE: The valve was trileaflet  Leaflets exhibited mildly increased thickness and normal cuspal separation  DOPPLER: Transaortic velocity was within the normal range  There was no evidence for stenosis  There was no regurgitation  TRICUSPID VALVE: DOPPLER: There was mild to moderate regurgitation   Estimated peak PA pressure was 45 to 50 mmHg     PULMONIC VALVE: DOPPLER: There was no significant regurgitation  PERICARDIUM: There was no thickening or calcification  There was no pericardial effusion  AORTA: The root exhibited normal size  SYSTEMIC VEINS: IVC: The inferior vena cava was mildly dilated  The respirophasic change in diameter was more than 50%  SYSTEM MEASUREMENT TABLES    2D mode  AoR Diam 2D: 3 8 cm  LA Diam (2D): 4 2 cm  LA/Ao (2D): 1 11  FS (2D Teich): 13 6 %  IVSd (2D): 1 26 cm  LVDEV: 152 cmï¾³  LVEDV MOD BP: 261 cmï¾³  LVESV: 108 cmï¾³  LVIDd(2D): 5 57 cm  LVISd (2D): 4 81 cm  LVOT Area 2D: 3 14 cmï¾²  LVPWd (2D): 1 18 cm  SV (Teich): 44 cmï¾³    Apical four chamber  LVEF A4C: 30 %    Apical two chamber  LVEF A2C: 26 %    Unspecified Scan Mode  MATTHEW Cont Eq (Peak Pierre): 2 56 cmï¾²  LVOT (VTI): 12 4 cm  LVOT Diam : 2 cm  LVOT Vmax: 985 mm/s  LVOT Vmax; Mean: 1010 mm/s  Peak Grad ; Mean: 4 mm[Hg]  SV (LVOT): 39 cmï¾³  VTI;Mean: 2 mm[Hg]  MATTHEW Cont Eq (VTI): 1 44 cmï¾²  MV Peak E Pierre   Mean: 1290 mm/s  MVA (PHT): 5 79 cmï¾²  PHT: 29 ms  Max P mm[Hg]  V Max: 3060 mm/s  Vmax: 2660 mm/s  RA Area: 27 9 cmï¾²  RA Volume: 105 7 cmï¾³  TAPSE: 1 5 cm    [employer] Accredited Echocardiography [facility]    Prepared and electronically signed by    Prosper Sharp MD  Signed 02-May-2020 13:42:41         Lab Review   Lab Results   Component Value Date    WBC 9 7 2021    HGB 14 6 2021    HCT 43 6 2021    MCV 90 2021    RDW 13 4 2021     2021     BMP:  Lab Results   Component Value Date    SODIUM 140 2021    K 4 8 2021     2021    CO2 22 2021    BUN 27 (H) 2021    CREATININE 1 37 (H) 2021    GLUC 100 (H) 2021    GLUF 105 (H) 2020    CALCIUM 8 5 2021    CORRECTEDCA 9 6 2021    EGFR 73 2021    MG 2 1 2021     LFT:  Lab Results   Component Value Date    AST 23 2021    ALT 20 2021    ALKPHOS 54 03/25/2021    TP 7 0 07/26/2021    ALB 4 7 07/26/2021      Lab Results   Component Value Date    VKP3OJKHMMEY 8 798 (H) 05/01/2020     No results found for: HGBA1C  Lipid Profile:   Lab Results   Component Value Date    CHOLESTEROL 194 07/26/2021    HDL 44 07/26/2021    LDLCALC 70 07/26/2021    TRIG 513 (H) 07/26/2021     Lab Results   Component Value Date    CHOLESTEROL 194 07/26/2021    CHOLESTEROL 115 05/04/2020     Lab Results   Component Value Date    TROPONINI <0 02 03/25/2021     Lab Results   Component Value Date    NTBNP 5,288 (H) 05/01/2020              Dr Margarito Poewll MD [facility]      "This note has been constructed using a voice recognition system  Therefore there may be syntax, spelling, and/or grammatical errors   Please call if you have any questions  "

## 2021-08-04 DIAGNOSIS — F33.9 EPISODE OF RECURRENT MAJOR DEPRESSIVE DISORDER, UNSPECIFIED DEPRESSION EPISODE SEVERITY (HCC): ICD-10-CM

## 2021-08-05 RX ORDER — BUPROPION HYDROCHLORIDE 150 MG/1
150 TABLET ORAL EVERY MORNING
Qty: 30 TABLET | Refills: 3 | Status: SHIPPED | OUTPATIENT
Start: 2021-08-05 | End: 2022-01-20 | Stop reason: SDUPTHER

## 2021-08-09 DIAGNOSIS — I50.42 CHRONIC COMBINED SYSTOLIC AND DIASTOLIC CONGESTIVE HEART FAILURE, NYHA CLASS 2 (HCC): ICD-10-CM

## 2021-08-10 ENCOUNTER — RA CDI HCC (OUTPATIENT)
Dept: OTHER | Facility: HOSPITAL | Age: 51
End: 2021-08-10

## 2021-08-10 RX ORDER — LISINOPRIL 20 MG/1
20 TABLET ORAL DAILY
Qty: 90 TABLET | Refills: 3 | Status: SHIPPED | OUTPATIENT
Start: 2021-08-10 | End: 2022-04-07 | Stop reason: SDUPTHER

## 2021-08-10 NOTE — PROGRESS NOTES
[facility] [employer]  coding opportunities          Number of diagnosis code(s) already on the problem list added to FYI fla               Number of suggestions used: 1         Patients insurance company: [employer] (Medicare and Commercial for [employer] and [employer])     Visit status: Patient arrived for their scheduled appointment     Provider never responded to [employer]  coding request     [facility] [employer]  coding opportunities        E66 01  Morbid (severe) obesity due to excess calories  *BMI of 40 and higher or BMI of 35-39 9 with comorbid condition  If this is correct, please document and assess at your next visit  2021                     Patients insurance company: [employer] (Medicare and [employer] for [employer] and [employer])

## 2021-08-16 NOTE — PATIENT INSTRUCTIONS
Recent Results (from the past 1344 hour(s))   Magnesium    Collection Time: 07/26/21  9:21 AM   Result Value Ref Range    Magnesium, Serum 2 1 1 6 - 2 3 mg/dL   Comprehensive metabolic panel    Collection Time: 07/26/21  9:21 AM   Result Value Ref Range    Glucose, Random 100 (H) 65 - 99 mg/dL    BUN 27 (H) 6 - 24 mg/dL    Creatinine 1 37 (H) 0 76 - 1 27 mg/dL    eGFR Non African American 59 (L) >59 mL/min/1 73    eGFR  68 >59 mL/min/1 73    SL AMB BUN/CREATININE RATIO 20 9 - 20    Sodium 140 134 - 144 mmol/L    Potassium 4 8 3 5 - 5 2 mmol/L    Chloride 103 96 - 106 mmol/L    CO2 22 20 - 29 mmol/L    CALCIUM 9 6 8 7 - 10 2 mg/dL    Protein, Total 7 0 6 0 - 8 5 g/dL    Albumin 4 7 3 8 - 4 9 g/dL    Globulin, Total 2 3 1 5 - 4 5 g/dL    Albumin/Globulin Ratio 2 0 1 2 - 2 2    TOTAL BILIRUBIN 0 3 0 0 - 1 2 mg/dL    Alk Phos Isoenzymes 71 48 - 121 IU/L    AST 23 0 - 40 IU/L    ALT 20 0 - 44 IU/L   CBC and differential    Collection Time: 07/26/21  9:21 AM   Result Value Ref Range    White Blood Cell Count 9 7 3 4 - 10 8 x10E3/uL    Red Blood Cell Count 4 85 4 14 - 5 80 x10E6/uL    Hemoglobin 14 6 13 0 - 17 7 g/dL    HCT 43 6 37 5 - 51 0 %    MCV 90 79 - 97 fL    MCH 30 1 26 6 - 33 0 pg    MCHC 33 5 31 5 - 35 7 g/dL    RDW 13 4 11 6 - 15 4 %    Platelet Count 985 645 - 450 x10E3/uL    Neutrophils 53 Not Estab  %    Lymphocytes 31 Not Estab  %    Monocytes 11 Not Estab  %    Eosinophils 3 Not Estab  %    Basophils PCT 1 Not Estab  %    Neutrophils (Absolute) 5 2 1 4 - 7 0 x10E3/uL    Lymphocytes (Absolute) 3 1 0 7 - 3 1 x10E3/uL    Monocytes (Absolute) 1 0 (H) 0 1 - 0 9 x10E3/uL    Eosinophils (Absolute) 0 3 0 0 - 0 4 x10E3/uL    Basophils ABS 0 1 0 0 - 0 2 x10E3/uL    Immature Granulocytes 1 Not Estab  %    Immature Granulocytes (Absolute) 0 1 0 0 - 0 1 x10E3/uL   TSH, 3rd generation    Collection Time: 07/26/21  9:21 AM   Result Value Ref Range    TSH 10 100 (H) 0 450 - 4 500 uIU/mL   Lipid panel Collection Time: 07/26/21  9:21 AM   Result Value Ref Range    Cholesterol, Total 194 100 - 199 mg/dL    Triglycerides 513 (H) 0 - 149 mg/dL    HDL 44 >39 mg/dL    VLDL Cholesterol Calculated 80 (H) 5 - 40 mg/dL    LDL Calculated 70 0 - 99 mg/dL    T  Chol/HDL Ratio 4 4 0 0 - 5 0 ratio   LDL cholesterol, direct    Collection Time: 07/26/21  9:21 AM   Result Value Ref Range    LDL Direct 76 0 - 99 mg/dL      hydrate well continue current medications low carb low-fat diet  Weight loss is suggested with Cardiology    Recheck thyroid 6-8 weeks       1/2 tab thyroid medicine daily for 1st week then full pill daily  Labs CT follow Cardiology Pulmonary

## 2021-08-16 NOTE — PROGRESS NOTES
Subjective:           Problem List Items Addressed This Visit        Endocrine    Hypothyroidism    Relevant Medications    levothyroxine 50 mcg tablet  1/2 tab daily    Other Relevant Orders    TSH, 3rd generation       Respiratory    Chronic respiratory failure with hypoxia (HCC) - Primary stable cont meds Follow up    Centrilobular emphysema (HCC) stable       Cardiovascular and Mediastinum    Benign essential hypertension stable cont medications    Chronic combined systolic and diastolic congestive heart failure (HCC) stable Cardiology    Secondary pulmonary arterial hypertension (HCC)    CAD (coronary artery disease) stable low fat        Other    Class 2 severe obesity with body mass index (BMI) of 35 to 39 9 with serious comorbidity ([address] )      Other Visit Diagnoses     Screening for prostate cancer        Relevant Orders    PSA, Total Screen    Encounter for screening for lung cancer        Relevant Orders    CT lung screening program        Agrees to ct screen      Orders Placed This Encounter   Procedures    CT lung screening program     Standing Status:   Future     Standing Expiration Date:   8/17/2025     Scheduling Instructions: If possible wear clothing without any metal in the chest and abdomen area  Sweat suit, shorts, sports bra or bra without underwire may eliminate the need to change  Please arrive 15 minutes prior to your appointment time to register  On the day of the test, please bring any prior CT or MRI studies of this area with you that were not performed at a [facility] facility  This is now being billed through your insurance and if you have a copay, it is required at time of service  Otherwise, you can go through   To schedule this appointment, please contact Central Scheduling at [phone]  Order Specific Question:   What is the patient's sedation requirement?      Answer:   No Sedation     Order Specific Question:   Does patient have a 20 pack year smoking history? (Equivalent to 1 pack of cigarettes per day for a year) IF NO, PATIENT IS NOT ELIGBLE FOR THIS PROGRAM      Answer:   Yes     Order Specific Question:   Has the patient been a current smoker or one who has quit smoking within the last 15 years? IF NO, PATIENT IS NOT ELIGIBLE FOR THIS PROGRAM      Answer:   Yes     Order Specific Question:   Does the patient show any signs or symptoms of lung cancer? Answer:   Yes     Order Specific Question:   Release to patient through [facility]     Answer:   Immediate     Order Specific Question:   Reason for Exam (FREE TEXT)     Answer:   screening     Order Specific Question:   Is this a low dose CT or a routine CT? Answer:   Low Dose CT [1]     Order Specific Question:   Is there documentation of shared decision making? Answer: Yes    TSH, 3rd generation     This is a patient instruction: This test is non-fasting  Please drink two glasses of water morning of bloodwork  Standing Status:   Future     Number of Occurrences:   1     Standing Expiration Date:   8/17/2022    PSA, Total Screen     This is a patient instruction: This test is non-fasting  Please drink two glasses of water morning of bloodwork          Standing Status:   Future     Standing Expiration Date:   8/17/2022       Patient Instructions     Recent Results (from the past 1344 hour(s))   Magnesium    Collection Time: 07/26/21  9:21 AM   Result Value Ref Range    Magnesium, Serum 2 1 1 6 - 2 3 mg/dL   Comprehensive metabolic panel    Collection Time: 07/26/21  9:21 AM   Result Value Ref Range    Glucose, Random 100 (H) 65 - 99 mg/dL    BUN 27 (H) 6 - 24 mg/dL    Creatinine 1 37 (H) 0 76 - 1 27 mg/dL    eGFR Non African American 59 (L) >59 mL/min/1 73    eGFR  68 >59 mL/min/1 73    SL AMB BUN/CREATININE RATIO 20 9 - 20    Sodium 140 134 - 144 mmol/L    Potassium 4 8 3 5 - 5 2 mmol/L    Chloride 103 96 - 106 mmol/L    CO2 22 20 - 29 mmol/L    CALCIUM 9 6 8 7 - 10 2 mg/dL    Protein, Total 7 0 6 0 - 8 5 g/dL    Albumin 4 7 3 8 - 4 9 g/dL    Globulin, Total 2 3 1 5 - 4 5 g/dL    Albumin/Globulin Ratio 2 0 1 2 - 2 2    TOTAL BILIRUBIN 0 3 0 0 - 1 2 mg/dL    Alk Phos Isoenzymes 71 48 - 121 IU/L    AST 23 0 - 40 IU/L    ALT 20 0 - 44 IU/L   CBC and differential    Collection Time: 07/26/21  9:21 AM   Result Value Ref Range    White Blood Cell Count 9 7 3 4 - 10 8 x10E3/uL    Red Blood Cell Count 4 85 4 14 - 5 80 x10E6/uL    Hemoglobin 14 6 13 0 - 17 7 g/dL    HCT 43 6 37 5 - 51 0 %    MCV 90 79 - 97 fL    MCH 30 1 26 6 - 33 0 pg    MCHC 33 5 31 5 - 35 7 g/dL    RDW 13 4 11 6 - 15 4 %    Platelet Count 496 155 - 450 x10E3/uL    Neutrophils 53 Not Estab  %    Lymphocytes 31 Not Estab  %    Monocytes 11 Not Estab  %    Eosinophils 3 Not Estab  %    Basophils PCT 1 Not Estab  %    Neutrophils (Absolute) 5 2 1 4 - 7 0 x10E3/uL    Lymphocytes (Absolute) 3 1 0 7 - 3 1 x10E3/uL    Monocytes (Absolute) 1 0 (H) 0 1 - 0 9 x10E3/uL    Eosinophils (Absolute) 0 3 0 0 - 0 4 x10E3/uL    Basophils ABS 0 1 0 0 - 0 2 x10E3/uL    Immature Granulocytes 1 Not Estab  %    Immature Granulocytes (Absolute) 0 1 0 0 - 0 1 x10E3/uL   TSH, 3rd generation    Collection Time: 07/26/21  9:21 AM   Result Value Ref Range    TSH 10 100 (H) 0 450 - 4 500 uIU/mL   Lipid panel    Collection Time: 07/26/21  9:21 AM   Result Value Ref Range    Cholesterol, Total 194 100 - 199 mg/dL    Triglycerides 513 (H) 0 - 149 mg/dL    HDL 44 >39 mg/dL    VLDL Cholesterol Calculated 80 (H) 5 - 40 mg/dL    LDL Calculated 70 0 - 99 mg/dL    T  Chol/HDL Ratio 4 4 0 0 - 5 0 ratio   LDL cholesterol, direct    Collection Time: 07/26/21  9:21 AM   Result Value Ref Range    LDL Direct 76 0 - 99 mg/dL      hydrate well continue current medications low carb low-fat diet  Weight loss is suggested with Cardiology    Recheck thyroid 6-8 weeks       1/2 tab thyroid medicine daily for 1st week then full pill daily    BMI Counseling: Body mass index is 38 64 kg/m²  Discussed the patient's BMI with him  The BMI is above normal  Nutrition recommendations include reducing portion sizes, decreasing overall calorie intake, 3-5 servings of fruits/vegetables daily, consuming healthier snacks, moderation in carbohydrate intake, increasing intake of lean protein and reducing intake of saturated fat and trans fat  Cassia Moran Complaint   Patient presents with    Follow-up     Review blood work results  HPI Anuja Page is in to review labs he has a history of centrilobular emphysema obstructive sleep apnea pulmonary edema CAD CHF YESICA, class 2 obesity  He recently visited Cardiology reviewed studies last EF 55%     recent labs revealed a TSH elevated at 10    Will need to start medication and follow-up    /82 (BP Location: Left arm, Patient Position: Sitting, Cuff Size: Large)   Pulse 89   Temp (!) 97 3 °F (36 3 °C) (Tympanic)   Resp 18   Ht 5' 10" (1 778 m)   Wt 122 kg (269 lb 4 8 oz)   SpO2 96%   BMI 38 64 kg/m²       Allergies   Allergen Reactions    Cheese - Food Allergy      Fever, nasal congestion    Chocolate - Food Allergy      Fever, nasal congestion    Nuts - Food Allergy      Fever, nasal congestion       Current Outpatient Medications on File Prior to Visit   Medication Sig Dispense Refill    acetaminophen (TYLENOL) 325 mg tablet Take 650 mg by mouth every 6 (six) hours as needed for mild pain or fever      aspirin 81 mg chewable tablet Chew 1 tablet (81 mg total) daily 30 tablet 0    atorvastatin (LIPITOR) 40 mg tablet Take 1 tablet (40 mg total) by mouth daily with dinner 90 tablet 3    buPROPion (WELLBUTRIN XL) 150 mg 24 hr tablet Take 1 tablet (150 mg total) by mouth every morning 30 tablet 3    carvedilol (COREG) 25 mg tablet Take 1 tablet (25 mg total) by mouth 2 (two) times a day with meals 60 tablet 11    furosemide (LASIX) 20 mg tablet Take 1 tablet (20 mg total) by mouth daily 30 tablet 5    lisinopril (ZESTRIL) 20 mg tablet Take 1 tablet (20 mg total) by mouth daily 90 tablet 3    Omeprazole-Sodium Bicarbonate (ZEGERID OTC PO) Take by mouth daily      spironolactone (ALDACTONE) 25 mg tablet Take 0 5 tablets (12 5 mg total) by mouth every other day 45 tablet 1    umeclidinium bromide (INCRUSE ELLIPTA) 62 5 mcg/inh AEPB inhaler Inhale 1 puff daily 1 Inhaler 0    albuterol (ProAir HFA) 90 mcg/act inhaler Inhale 2 puffs every 4 (four) hours as needed for wheezing or shortness of breath (Patient not taking: Reported on 6/2/2021) 8 5 g 0    famotidine (PEPCID) 20 mg tablet Take 20 mg by mouth 2 (two) times a day (Patient not taking: Reported on 7/27/2021)      multivitamin-minerals (CENTRUM ADULTS) tablet Take 1 tablet by mouth daily (Patient not taking: Reported on 4/5/2021) 30 tablet 0     No current facility-administered medications on file prior to visit  Past Medical History:   Diagnosis Date    Acute hypoxemic respiratory failure (HCC)     Anxiety     Cardiomyopathy ([address] )     Centrilobular emphysema ([address] ) 5/20/2020    CHF (congestive heart failure) (HCC)     55% no pacer needed    Combined systolic and diastolic cardiac dysfunction     Coronary artery disease     Depression     Hypertension     Mixed sleep apnea     Obesity     Secondary pulmonary arterial hypertension ([address] )     Substance abuse ([address] )     marijuana cookie some time for sleeping   Volume overload        Past Surgical History:   Procedure Laterality Date    CHOLECYSTECTOMY      FRACTURE SURGERY      jaw    KNEE SURGERY      MANDIBLE SURGERY      TONSILLECTOMY            reports that he quit smoking about 7 years ago  His smoking use included cigarettes  He has a 25 00 pack-year smoking history  He has never used smokeless tobacco  He reports current alcohol use of about 2 0 standard drinks of alcohol per week  He reports current drug use  Drug: Marijuana       reports that he quit smoking about 7 years ago  His smoking use included cigarettes  He has a 25 00 pack-year smoking history  He has never used smokeless tobacco         Review of Systems   Constitutional: Negative for appetite change, diaphoresis and fever  HENT: Negative for congestion, ear pain, postnasal drip, sinus pressure, tinnitus and voice change  Eyes: Negative for discharge and itching  Respiratory: Negative for cough, chest tightness, shortness of breath and stridor  Cardiovascular: Negative for chest pain and palpitations  Gastrointestinal: Negative for abdominal distention, blood in stool, diarrhea and vomiting  Endocrine: Negative for cold intolerance  Genitourinary: Negative for flank pain, genital sores and testicular pain  Musculoskeletal: Negative for arthralgias, joint swelling and myalgias  Skin: Negative for rash  Neurological: Negative for tremors, speech difficulty and headaches  Hematological: Negative for adenopathy  Psychiatric/Behavioral: Negative for behavioral problems, dysphoric mood, hallucinations and suicidal ideas  Physical Exam  Vitals and nursing note reviewed  Constitutional:       General: He is not in acute distress  Appearance: He is well-developed  He is obese  He is not toxic-appearing  HENT:      Head: Normocephalic and atraumatic  Right Ear: External ear normal       Left Ear: External ear normal       Mouth/Throat:      Pharynx: No oropharyngeal exudate  Eyes:      General: No scleral icterus  Right eye: No discharge  Left eye: No discharge  Conjunctiva/sclera: Conjunctivae normal       Pupils: Pupils are equal, round, and reactive to light  Neck:      Trachea: No tracheal deviation  Cardiovascular:      Rate and Rhythm: Normal rate and regular rhythm  Pulses: Normal pulses  Heart sounds: Murmur heard  No friction rub  Pulmonary:      Effort: Pulmonary effort is normal  No respiratory distress        Breath sounds: No stridor  No wheezing, rhonchi or rales  Abdominal:      General: Bowel sounds are normal  There is no distension  Palpations: Abdomen is soft  Tenderness: There is no guarding or rebound  Musculoskeletal:         General: No deformity  Cervical back: Normal range of motion and neck supple  Lymphadenopathy:      Cervical: No cervical adenopathy  Skin:     General: Skin is warm and dry  Capillary Refill: Capillary refill takes less than 2 seconds  Findings: No rash  Neurological:      General: No focal deficit present  Mental Status: He is alert and oriented to person, place, and time  Cranial Nerves: No cranial nerve deficit  Motor: No abnormal muscle tone  Coordination: Coordination normal       Gait: Gait normal    Psychiatric:         Behavior: Behavior normal          Thought Content:  Thought content normal          Judgment: Judgment normal

## 2021-08-17 ENCOUNTER — OFFICE VISIT (OUTPATIENT)
Dept: FAMILY MEDICINE CLINIC | Facility: CLINIC | Age: 51
End: 2021-08-17
Payer: COMMERCIAL

## 2021-08-17 ENCOUNTER — TELEPHONE (OUTPATIENT)
Dept: CARDIOLOGY CLINIC | Facility: CLINIC | Age: 51
End: 2021-08-17

## 2021-08-17 VITALS
DIASTOLIC BLOOD PRESSURE: 82 MMHG | BODY MASS INDEX: 38.55 KG/M2 | SYSTOLIC BLOOD PRESSURE: 148 MMHG | TEMPERATURE: 97.3 F | OXYGEN SATURATION: 96 % | HEIGHT: 70 IN | RESPIRATION RATE: 18 BRPM | HEART RATE: 89 BPM | WEIGHT: 269.3 LBS

## 2021-08-17 DIAGNOSIS — J43.2 CENTRILOBULAR EMPHYSEMA (HCC): ICD-10-CM

## 2021-08-17 DIAGNOSIS — Z12.5 SCREENING FOR PROSTATE CANCER: ICD-10-CM

## 2021-08-17 DIAGNOSIS — E66.01 CLASS 2 SEVERE OBESITY WITH BODY MASS INDEX (BMI) OF 35 TO 39.9 WITH SERIOUS COMORBIDITY (HCC): ICD-10-CM

## 2021-08-17 DIAGNOSIS — I10 BENIGN ESSENTIAL HYPERTENSION: ICD-10-CM

## 2021-08-17 DIAGNOSIS — I27.21 SECONDARY PULMONARY ARTERIAL HYPERTENSION (HCC): ICD-10-CM

## 2021-08-17 DIAGNOSIS — I50.42 CHRONIC COMBINED SYSTOLIC AND DIASTOLIC CONGESTIVE HEART FAILURE (HCC): ICD-10-CM

## 2021-08-17 DIAGNOSIS — J96.11 CHRONIC RESPIRATORY FAILURE WITH HYPOXIA (HCC): Primary | ICD-10-CM

## 2021-08-17 DIAGNOSIS — Z12.2 ENCOUNTER FOR SCREENING FOR LUNG CANCER: ICD-10-CM

## 2021-08-17 DIAGNOSIS — E03.9 HYPOTHYROIDISM, UNSPECIFIED TYPE: ICD-10-CM

## 2021-08-17 DIAGNOSIS — I25.10 CORONARY ARTERY DISEASE INVOLVING NATIVE CORONARY ARTERY OF NATIVE HEART WITHOUT ANGINA PECTORIS: ICD-10-CM

## 2021-08-17 LAB
BUN SERPL-MCNC: 20 MG/DL (ref 6–24)
BUN/CREAT SERPL: 16 (ref 9–20)
CALCIUM SERPL-MCNC: 9.6 MG/DL (ref 8.7–10.2)
CHLORIDE SERPL-SCNC: 105 MMOL/L (ref 96–106)
CO2 SERPL-SCNC: 21 MMOL/L (ref 20–29)
CREAT SERPL-MCNC: 1.27 MG/DL (ref 0.76–1.27)
DEPRECATED FTI SERPL-MCNC: 1.3 UG/DL (ref 1.2–4.9)
GLUCOSE SERPL-MCNC: 116 MG/DL (ref 65–99)
POTASSIUM SERPL-SCNC: 4.4 MMOL/L (ref 3.5–5.2)
SL AMB EGFR AFRICAN AMERICAN: 75 ML/MIN/1.73
SL AMB EGFR NON AFRICAN AMERICAN: 65 ML/MIN/1.73
SODIUM SERPL-SCNC: 142 MMOL/L (ref 134–144)
T3FREE SERPL-MCNC: 4.3 PG/ML (ref 2–4.4)
T3RU NFR SERPL: 24 % (ref 24–39)
T4 SERPL-MCNC: 5.3 UG/DL (ref 4.5–12)
TSH SERPL DL<=0.005 MIU/L-ACNC: 6.89 UIU/ML (ref 0.45–4.5)

## 2021-08-17 PROCEDURE — 1036F TOBACCO NON-USER: CPT | Performed by: FAMILY MEDICINE

## 2021-08-17 PROCEDURE — 99214 OFFICE O/P EST MOD 30 MIN: CPT | Performed by: FAMILY MEDICINE

## 2021-08-17 PROCEDURE — 3077F SYST BP >= 140 MM HG: CPT | Performed by: FAMILY MEDICINE

## 2021-08-17 PROCEDURE — 3079F DIAST BP 80-89 MM HG: CPT | Performed by: FAMILY MEDICINE

## 2021-08-17 PROCEDURE — 3008F BODY MASS INDEX DOCD: CPT | Performed by: FAMILY MEDICINE

## 2021-08-17 RX ORDER — LEVOTHYROXINE SODIUM 0.05 MG/1
50 TABLET ORAL
Qty: 90 TABLET | Refills: 3 | Status: SHIPPED | OUTPATIENT
Start: 2021-08-17 | End: 2021-08-17

## 2021-08-17 RX ORDER — LEVOTHYROXINE SODIUM 0.05 MG/1
25 TABLET ORAL
Qty: 90 TABLET | Refills: 3
Start: 2021-08-17

## 2021-08-17 NOTE — TELEPHONE ENCOUNTER
----- Message from Edgar Smith MD sent at 8/17/2021  8:35 AM EDT -----  Patient blood test reviewed  They are acceptable  Will continue same medication  Patient should keep his appointment

## 2021-08-17 NOTE — TELEPHONE ENCOUNTER
Spoke with patient, message given per Dr Dina Miller  Patient verbalized understanding and is seeing his PCP today

## 2021-08-24 DIAGNOSIS — I50.43 ACUTE ON CHRONIC COMBINED SYSTOLIC AND DIASTOLIC CHF (CONGESTIVE HEART FAILURE) (HCC): ICD-10-CM

## 2021-08-24 RX ORDER — FUROSEMIDE 20 MG/1
20 TABLET ORAL DAILY
Qty: 30 TABLET | Refills: 5 | Status: SHIPPED | OUTPATIENT
Start: 2021-08-24 | End: 2022-04-08

## 2021-10-07 DIAGNOSIS — I50.43 ACUTE ON CHRONIC COMBINED SYSTOLIC AND DIASTOLIC CHF (CONGESTIVE HEART FAILURE) (HCC): ICD-10-CM

## 2021-10-07 RX ORDER — SPIRONOLACTONE 25 MG/1
12.5 TABLET ORAL EVERY OTHER DAY
Qty: 45 TABLET | Refills: 3 | Status: SHIPPED | OUTPATIENT
Start: 2021-10-07 | End: 2022-04-07 | Stop reason: SDUPTHER

## 2022-01-20 DIAGNOSIS — F33.9 EPISODE OF RECURRENT MAJOR DEPRESSIVE DISORDER, UNSPECIFIED DEPRESSION EPISODE SEVERITY (HCC): ICD-10-CM

## 2022-01-21 RX ORDER — BUPROPION HYDROCHLORIDE 150 MG/1
150 TABLET ORAL EVERY MORNING
Qty: 30 TABLET | Refills: 3 | Status: SHIPPED | OUTPATIENT
Start: 2022-01-21 | End: 2022-06-15 | Stop reason: SDUPTHER

## 2022-03-01 DIAGNOSIS — I10 UNCONTROLLED HYPERTENSION: ICD-10-CM

## 2022-03-01 NOTE — TELEPHONE ENCOUNTER
Requesting refill of Carvedilol 25 mg sent to [facility]  He is out of medication as of last night

## 2022-03-02 RX ORDER — CARVEDILOL 25 MG/1
25 TABLET ORAL 2 TIMES DAILY WITH MEALS
Qty: 180 TABLET | Refills: 0 | Status: SHIPPED | OUTPATIENT
Start: 2022-03-02 | End: 2022-06-13 | Stop reason: SDUPTHER

## 2022-04-07 ENCOUNTER — OFFICE VISIT (OUTPATIENT)
Dept: CARDIOLOGY CLINIC | Facility: CLINIC | Age: 52
End: 2022-04-07
Payer: COMMERCIAL

## 2022-04-07 VITALS
HEART RATE: 77 BPM | HEIGHT: 70 IN | DIASTOLIC BLOOD PRESSURE: 88 MMHG | SYSTOLIC BLOOD PRESSURE: 120 MMHG | TEMPERATURE: 97.6 F | OXYGEN SATURATION: 91 % | WEIGHT: 267 LBS | BODY MASS INDEX: 38.22 KG/M2

## 2022-04-07 DIAGNOSIS — J43.2 CENTRILOBULAR EMPHYSEMA (HCC): ICD-10-CM

## 2022-04-07 DIAGNOSIS — I50.43 ACUTE ON CHRONIC COMBINED SYSTOLIC AND DIASTOLIC CHF (CONGESTIVE HEART FAILURE) (HCC): ICD-10-CM

## 2022-04-07 DIAGNOSIS — G47.33 OSA (OBSTRUCTIVE SLEEP APNEA): ICD-10-CM

## 2022-04-07 DIAGNOSIS — I42.0 DILATED CARDIOMYOPATHY (HCC): ICD-10-CM

## 2022-04-07 DIAGNOSIS — I27.21 SECONDARY PULMONARY ARTERIAL HYPERTENSION (HCC): ICD-10-CM

## 2022-04-07 DIAGNOSIS — I50.42 CHRONIC COMBINED SYSTOLIC AND DIASTOLIC CONGESTIVE HEART FAILURE, NYHA CLASS 2 (HCC): ICD-10-CM

## 2022-04-07 DIAGNOSIS — I25.10 CORONARY ARTERY DISEASE INVOLVING NATIVE CORONARY ARTERY OF NATIVE HEART WITHOUT ANGINA PECTORIS: ICD-10-CM

## 2022-04-07 DIAGNOSIS — E78.5 DYSLIPIDEMIA: ICD-10-CM

## 2022-04-07 DIAGNOSIS — R06.00 DOE (DYSPNEA ON EXERTION): ICD-10-CM

## 2022-04-07 DIAGNOSIS — Z72.89 ALCOHOL USE: ICD-10-CM

## 2022-04-07 PROCEDURE — 1036F TOBACCO NON-USER: CPT | Performed by: INTERNAL MEDICINE

## 2022-04-07 PROCEDURE — 93000 ELECTROCARDIOGRAM COMPLETE: CPT | Performed by: INTERNAL MEDICINE

## 2022-04-07 PROCEDURE — 99214 OFFICE O/P EST MOD 30 MIN: CPT | Performed by: INTERNAL MEDICINE

## 2022-04-07 PROCEDURE — 3079F DIAST BP 80-89 MM HG: CPT | Performed by: INTERNAL MEDICINE

## 2022-04-07 PROCEDURE — 3074F SYST BP LT 130 MM HG: CPT | Performed by: INTERNAL MEDICINE

## 2022-04-07 PROCEDURE — 3008F BODY MASS INDEX DOCD: CPT | Performed by: INTERNAL MEDICINE

## 2022-04-07 RX ORDER — LISINOPRIL 20 MG/1
20 TABLET ORAL DAILY
Qty: 90 TABLET | Refills: 3 | Status: SHIPPED | OUTPATIENT
Start: 2022-04-07

## 2022-04-07 RX ORDER — SPIRONOLACTONE 25 MG/1
12.5 TABLET ORAL EVERY OTHER DAY
Qty: 45 TABLET | Refills: 3 | Status: SHIPPED | OUTPATIENT
Start: 2022-04-07

## 2022-04-07 NOTE — PROGRESS NOTES
Progress Note - Cardiology Office  [facility]    Madeleine Quiros 47 y o  male MRN: [id number]  : 1970  Encounter: [id number]      Assessment:     1  Chronic combined systolic and diastolic congestive heart failure, NYHA class 2 ([facility] )    2  ZAYAS (dyspnea on exertion)    3  Dilated cardiomyopathy ([facility] )    4  Dyslipidemia    5  Coronary artery disease involving native coronary artery of native heart without angina pectoris    6  Secondary pulmonary arterial hypertension (HCC)    7  Centrilobular emphysema ([facility] )    8  PARKER (obstructive sleep apnea)    9  Alcohol use    10  Acute on chronic combined systolic and diastolic CHF (congestive heart failure) ([facility] )        Discussion Summary and Plan:  1  Combined systolic and diastolic heart failure with dilated cardiomyopathy  Patient EF was found to be 25- 30%  Last echo in  shows his EF is improved  Will continue Coreg Aldactone lisinopril  He has no recent blood test will check CMP CBC fasting lipid TSH  He has gained about 75 lb since 2020  Need to lose weight otherwise he will have exertional shortness of breath    2  Nonischemic cardiomyopathy  Most likely etiology longstanding uncontrolled hypertension in the setting of LBBB and alcohol abuse  Cardiac catheterization now evidence of obstructive CAD  Continue medical Rx      3  CKD with history of kidney injury  History of CKD with kidney injury now last blood test was in 2021 will check CMP     4  Dyspnea on exertion  He had chronic exertional shortness of breath which is more pronounced he used to be heavy smoker he has quit smoking now  Possible etiology is probably underlying obesity  Need to lose weight discussed with patient  5  History of obstructive sleep apnea  Could not use CPAP machine  He will recontact pulmonary MD       6  Dyslipidemia on statin  His repeat blood test showed triglyceride levels very high    He admitted he was not taking his night medication all the time  But also he is drinking and he is hypothyroidism  His triglyceride levels are around 500  That was in the last blood test   He was not taking his statins  Will check fasting lipids and LFTs      7  Previous history of alcohol abuse and tobacco abuse  He still drinks  Advised him to stop drinking otherwise high risk for development and worsening of cardiomyopathy  This was discussed with him again  Discussed with patient    8  Obesity with BMI 38  Encouraged him to lose weight  He is encouraged and    9  Hypothyroidism  Will monitor TSH as he is on levothyroxine 25 mcg daily  Continue current Rx follow-up 4 to 6 months  Please call [phone] if any questions  Counseling :  A description of the counseling  Goals and Barriers  Patient's ability to self care: Yes  Medication side effect reviewed with patient in detail and all their questions answered to their satisfaction  HPI :     Jing Null is a 47y o  year old male who came for follow up  Patient was recently admitted to [facility] with shortness of breath  He was diagnosed to have pulmonary edema later non workup shows that he had cardiomyopathy with EF around 20-30% with paradoxical septal motion secondary to his LBBB  During workup of also found to have hypertension, LBBB, history of emphysema/COPD  He used to smoke heavily quit 6 years ago  He used to drink also now he drinks occasionally  He underwent cardiac catheterization found to have mild nonobstructive disease and was started on medical therapy  He has a defibrillator vest   No ICD shocks  Since hospitalization he has lost about 30 lb  He is breathing much better there is no leg edema no nausea no vomiting no other issues  He feels his doing well much better than before  07/27/2021  Above reviewed  Patient came for follow-up  He had history of cardiomyopathy with EF at time used to be 30% which has improved to 55%    He had LBBB at that time  His QRS is now narrow  He does have history of COPD, emphysema, sleep apnea and BMI still remains around 38  He has gained his weight back  He denies any chest pain any shortness of breath  He admits he does not take his medication at particularly cholesterol the time he misses some does he is back to drinking but not same amount  He has recent blood test done which shows his thyroid function is abnormal and his triglyceride is significantly elevated and his cholesterol has also increased  I believe he is not taking his Lipitor as prescribed and he may have hypothyroidism  History of sleep apnea but could not use CPAP machine  No nausea no vomiting no other issues at this time  His labs reviewed with him  04/07/2022  Above reviewed  Patient came for follow-up  Patient had history of cardiomyopathy and EF used to be around 35% which improved to 55%  He had history of COPD, emphysema, sleep apnea, obesity with BMI around 38, history of alcohol abuse who came for follow-up  His last blood test was in July of 2021 is triglycerides were still elevated  They were in 500 range  He had stopped taking his medications he has no blood test since August 2021 at that time his electrolytes were acceptable  He does have history of sleep apnea but he could not use CPAP machine  He is taking Aldactone every other day, Lasix 20 mg daily along with Zestril and carvedilol  Unfortunately since last visit he has gained some weight as he lost his job and he has been at home not exercising  He is motivated to lose weight  His EKG shows heart rate 77 beats per minute  He used to have LBBB currently his QRS is narrow  He used to weigh around 225 when he was doing best now he is 267  He understands it and is working on it  Review of Systems   Constitutional: Positive for unexpected weight change  Negative for activity change, chills, diaphoresis and fever          Has gained weight   HENT: Negative for congestion  Eyes: Negative for discharge and redness  Respiratory: Negative for cough, chest tightness, shortness of breath and wheezing  Cardiovascular: Negative  Negative for chest pain, palpitations and leg swelling  Gastrointestinal: Negative for abdominal pain, diarrhea and nausea  Endocrine: Negative  Genitourinary: Negative for decreased urine volume and urgency  Musculoskeletal: Negative  Negative for arthralgias, back pain and gait problem  Skin: Negative for rash and wound  Allergic/Immunologic: Negative  Neurological: Negative for dizziness, seizures, syncope, weakness, light-headedness and headaches  Hematological: Negative  Psychiatric/Behavioral: Negative for agitation and confusion  The patient is not nervous/anxious  Historical Information   Past Medical History:   Diagnosis Date    Acute hypoxemic respiratory failure ([facility] )     Anxiety     Cardiomyopathy ([facility] )     Centrilobular emphysema ([facility] ) 5/20/2020    CH[facility]congestive [facility] failure) ([facility])     55% no pacer needed    Combined systolic and diastolic cardiac dysfunction     Coronary artery disease     Depression     Hypertension     Mixed sleep apnea     Obesity     [facility]     Substance abuse ([place] [address] )     marijuana cookie some time for sleeping       Volume overload      Past Surgical History:   Procedure Laterality Date    CHOLECYSTECTOMY      FRACTURE SURGERY      jaw    KNEE SURGERY      MANDIBLE SURGERY      TONSILLECTOMY       Social History     Substance and Sexual Activity   Alcohol Use Yes    Alcohol/week: 2 0 standard drinks    Types: 2 Cans of beer per week    Comment: Last drink Monday 9/26     Social History     Substance and Sexual Activity   Drug Use Yes    Types: Marijuana    Comment: pot cookie once in a while for sleeping      Social History     Tobacco Use   Smoking Status Former Smoker    Packs/day: 1 00    Years: 25 00    Pack years: 25 00    Types: Cigarettes    Quit date: 2014    Years since quittin 9   Smokeless Tobacco Never Used     Family History:   Family History   Problem Relation Age of Onset    Heart disease Father     Cancer Father     Cancer Mother     Colon cancer Mother     No Known Problems Sister        Meds/Allergies     Allergies   Allergen Reactions    Cheese - Food Allergy      Fever, nasal congestion    Chocolate - Food Allergy      Fever, nasal congestion    Nuts - Food Allergy      Fever, nasal congestion       Current Outpatient Medications:     acetaminophen (TYLENOL) 325 mg tablet, Take 650 mg by mouth every 6 (six) hours as needed for mild pain or fever, Disp: , Rfl:     aspirin 81 mg chewable tablet, Chew 1 tablet (81 mg total) daily, Disp: 30 tablet, Rfl: 0    atorvastatin (LIPITOR) 40 mg tablet, Take 1 tablet (40 mg total) by mouth daily with dinner, Disp: 90 tablet, Rfl: 3    buPROPion (WELLBUTRIN XL) 150 mg 24 hr tablet, Take 1 tablet (150 mg total) by mouth every morning, Disp: 30 tablet, Rfl: 3    carvedilol (COREG) 25 mg tablet, Take 1 tablet (25 mg total) by mouth 2 (two) times a day with meals, Disp: 180 tablet, Rfl: 0    furosemide (LASIX) 20 mg tablet, Take 1 tablet (20 mg total) by mouth daily, Disp: 30 tablet, Rfl: 5    levothyroxine 50 mcg tablet, Take 0 5 tablets (25 mcg total) by mouth daily in the early morning, Disp: 90 tablet, Rfl: 3    lisinopril (ZESTRIL) 20 mg tablet, Take 1 tablet (20 mg total) by mouth daily, Disp: 90 tablet, Rfl: 3    Omeprazole-Sodium Bicarbonate (ZEGERID OTC PO), Take by mouth daily, Disp: , Rfl:     spironolactone (ALDACTONE) 25 mg tablet, Take 0 5 tablets (12 5 mg total) by mouth every other day, Disp: 45 tablet, Rfl: 3    albuterol (ProAir HFA) 90 mcg/act inhaler, Inhale 2 puffs every 4 (four) hours as needed for wheezing or shortness of breath (Patient not taking: Reported on 2021), Disp: 8 5 g, Rfl: 0    famotidine (PEPCID) 20 mg tablet, Take 20 mg by mouth 2 (two) times a day (Patient not taking: Reported on 7/27/2021), Disp: , Rfl:     multivitamin-minerals (CENTRUM ADULTS) tablet, Take 1 tablet by mouth daily (Patient not taking: Reported on 4/5/2021), Disp: 30 tablet, Rfl: 0    umeclidinium bromide (INCRUSE ELLIPTA) 62 5 mcg/inh AEPB inhaler, Inhale 1 puff daily (Patient not taking: Reported on 4/7/2022 ), Disp: 1 Inhaler, Rfl: 0    Vitals: Blood pressure 120/88, pulse 77, temperature 97 6 °F (36 4 °C), height 5' 10" (1 778 m), weight 121 kg (267 lb), SpO2 91 %  Body mass index is 38 31 kg/m²  Vitals:    04/07/22 1543   Weight: 121 kg (267 lb)     BP Readings from Last 3 Encounters:   04/07/22 120/88   08/17/21 148/82   07/27/21 124/88       Physical Exam  Constitutional:       General: He is not in acute distress  Appearance: He is well-developed  He is not diaphoretic  Neck:      Thyroid: No thyromegaly  Vascular: No JVD  Trachea: No tracheal deviation  Cardiovascular:      Rate and Rhythm: Normal rate and regular rhythm  Heart sounds: S1 normal and S2 normal  Heart sounds not distant  Murmur heard  Systolic (ejection) murmur is present with a grade of 2/6  No friction rub  No gallop  No S3 or S4 sounds  Pulmonary:      Effort: Pulmonary effort is normal  No respiratory distress  Breath sounds: Normal breath sounds  No wheezing or rales  Chest:      Chest wall: No tenderness  Abdominal:      General: Bowel sounds are normal  There is no distension  Palpations: Abdomen is soft  Tenderness: There is no abdominal tenderness  Musculoskeletal:         General: No deformity  Cervical back: Neck supple  Skin:     General: Skin is warm and dry  Coloration: Skin is not pale  Findings: No rash  Neurological:      Mental Status: He is alert and oriented to person, place, and time     Psychiatric:         Behavior: Behavior normal          Judgment: Judgment normal  Diagnostic Studies Review Cardio:  Cardiac catheterization and echo report reviewed  EKG:  Patient has history of LBBB    Twelve lead EKG 2021 shows normal sinus rhythm heart rate 79 beats per minute QTC mildly prolonged  Twelve lead EKG 2020 shows normal sinus rhythm heart rate 82 beats per minute no change from previous EKG  milliseconds  Twelve lead EKG done on 2022:  Normal sinus rhythm heart rate 77 beats per minute Q without inferior leads most likely due to lead location poor R-wave progression no change from previous EKG QTC is 482 milliseconds  Cardiac testing:   Results for orders placed during the hospital encounter of 20   Echo complete with contrast if indicated    [address]  [address] [address]  [phone]    Transthoracic Echocardiogram  2D, M-mode, Doppler, and Color Doppler    Study date:  02-May-2020    Patient: Michael Younger  MR number: [id number]  Account number: [de-identified]  : 1970  Age: 45 years  Gender: Male  Status: Inpatient  Location: Bedside  Height: 70 in  Weight: 270 4 lb  BP: 153/ 96 mmHg    Indications: SOB    Diagnoses: I50 9 - Heart failure, unspecified    Sonographer:  PRAMOD Evangelista  Referring Physician:  BRISEIDA Lara  Group:  [facility]  Interpreting Physician:  Vahe Mcguire MD    SUMMARY    LEFT VENTRICLE:  The ventricle was mildly dilated  Systolic function was severely reduced  Ejection fraction was estimated in the range of 20 % to 30 %  There was severe diffuse hypokinesis  Wall thickness was mildly increased  There was mild concentric hypertrophy  Doppler parameters were consistent with a reversible restrictive pattern, indicative of decreased left ventricular diastolic compliance and/or increased left atrial pressure (grade 3 diastolic dysfunction)      VENTRICULAR SEPTUM:  There was moderate paradoxical motion  These changes are consistent with LBBB  RIGHT VENTRICLE:  The ventricle was mildly dilated  Systolic function was mildly reduced  LEFT ATRIUM:  The atrium was moderately dilated  RIGHT ATRIUM:  The atrium was mildly dilated  MITRAL VALVE:  There was mild regurgitation  TRICUSPID VALVE:  There was mild to moderate regurgitation  Estimated peak PA pressure was 45 to 50 mmHg  IVC, HEPATIC VEINS:  The inferior vena cava was mildly dilated  The respirophasic change in diameter was more than 50%  HISTORY: PRIOR HISTORY: HTN,Acute kidney injury,pulmonary edema,esophageal reflux,Luetscher's syndrome,panic disorder,seborrhea  PROCEDURE: The procedure was performed at the bedside  This was a routine study  The transthoracic approach was used  The study included complete 2D imaging, M-mode, complete spectral Doppler, and color Doppler  The heart rate was 95 bpm,  at the start of the study  Images were obtained from the parasternal, apical, subcostal, and suprasternal notch acoustic windows  Image quality was adequate  LEFT VENTRICLE: The ventricle was mildly dilated  Systolic function was severely reduced  Ejection fraction was estimated in the range of 20 % to 30 %  There was severe diffuse hypokinesis  Wall thickness was mildly increased  There was  mild concentric hypertrophy  DOPPLER: Doppler parameters were consistent with a reversible restrictive pattern, indicative of decreased left ventricular diastolic compliance and/or increased left atrial pressure (grade 3 diastolic  dysfunction)  VENTRICULAR SEPTUM: There was moderate paradoxical motion  These changes are consistent with LBBB  RIGHT VENTRICLE: The ventricle was mildly dilated  Systolic function was mildly reduced  LEFT ATRIUM: The atrium was moderately dilated  RIGHT ATRIUM: The atrium was mildly dilated  MITRAL VALVE: There was normal leaflet separation  DOPPLER: Transmitral velocity was minimally increased  There was no evidence for stenosis  There was mild regurgitation  AORTIC VALVE: The valve was trileaflet  Leaflets exhibited mildly increased thickness and normal cuspal separation  DOPPLER: Transaortic velocity was within the normal range  There was no evidence for stenosis  There was no regurgitation  TRICUSPID VALVE: DOPPLER: There was mild to moderate regurgitation  Estimated peak PA pressure was 45 to 50 mmHg  PULMONIC VALVE: DOPPLER: There was no significant regurgitation  PERICARDIUM: There was no thickening or calcification  There was no pericardial effusion  AORTA: The root exhibited normal size  SYSTEMIC VEINS: IVC: The inferior vena cava was mildly dilated  The respirophasic change in diameter was more than 50%  SYSTEM MEASUREMENT TABLES    2D mode  AoR Diam 2D: 3 8 cm  LA Diam (2D): 4 2 cm  LA/Ao (2D): 1 11  FS (2D Teich): 13 6 %  IVSd (2D): 1 26 cm  LVDEV: 152 cmï¾³  LVEDV MOD BP: 261 cmï¾³  LVESV: 108 cmï¾³  LVIDd(2D): 5 57 cm  LVISd (2D): 4 81 cm  LVOT Area 2D: 3 14 cmï¾²  LVPWd (2D): 1 18 cm  SV (Teich): 44 cmï¾³    Apical four chamber  LVEF A4C: 30 %    Apical two chamber  LVEF A2C: 26 %    Unspecified Scan Mode  MATTHEW Cont Eq (Peak Pierre): 2 56 cmï¾²  LVOT (VTI): 12 4 cm  LVOT Diam : 2 cm  LVOT Vmax: 985 mm/s  LVOT Vmax; Mean: 1010 mm/s  Peak Grad ; Mean: 4 mm[Hg]  SV (LVOT): 39 cmï¾³  VTI;Mean: 2 mm[Hg]  MATTHEW Cont Eq (VTI): 1 44 cmï¾²  MV Peak E Pierre   Mean: 1290 mm/s  MVA (PHT): 5 79 cmï¾²  PHT: 29 ms  Max P mm[Hg]  V Max: 3060 mm/s  Vmax: 2660 mm/s  RA Area: 27 9 cmï¾²  RA Volume: 105 7 cmï¾³  TAPSE: 1 5 cm    [employer] [facility]redited [facility]    Prepared and electronically signed by    Awa Ayala MD  Signed 02-May-2020 13:42:41         Lab Review   Lab Results   Component Value Date    WBC 9 7 2021    HGB 14 6 2021    HCT 43 6 2021    MCV 90 2021    RDW 13 4 2021     2021     BMP:  Lab Results Component Value Date    SODIUM 142 08/16/2021    K 4 4 08/16/2021     08/16/2021    CO2 21 08/16/2021    BUN 20 08/16/2021    CREATININE 1 27 08/16/2021    GLUC 116 (H) 08/16/2021    GLUF 105 (H) 05/28/2020    CALCIUM 8 5 03/25/2021    CORRECTEDCA 9 6 03/25/2021    EGFR 73 03/25/2021    MG 2 1 07/26/2021     LFT:  Lab Results   Component Value Date    AST 23 07/26/2021    ALT 20 07/26/2021    ALKPHOS 54 03/25/2021    TP 7 0 07/26/2021    ALB 4 7 07/26/2021      Lab Results   Component Value Date    TKS0EHOSAJTZ 8 798 (H) 05/01/2020     No results found for: HGBA1C  Lipid Profile:   Lab Results   Component Value Date    CHOLESTEROL 194 07/26/2021    HDL 44 07/26/2021    LDLCALC 70 07/26/2021    TRIG 513 (H) 07/26/2021     Lab Results   Component Value Date    CHOLESTEROL 194 07/26/2021    CHOLESTEROL 115 05/04/2020     Lab Results   Component Value Date    TROPONINI <0 02 03/25/2021     Lab Results   Component Value Date    NTBNP 5,288 (H) 05/01/2020              Dr Sarahy Navarro MD [facility]      "This note has been constructed using a voice recognition system  Therefore there may be syntax, spelling, and/or grammatical errors   Please call if you have any questions  "

## 2022-04-08 DIAGNOSIS — I50.43 ACUTE ON CHRONIC COMBINED SYSTOLIC AND DIASTOLIC CHF (CONGESTIVE HEART FAILURE) (HCC): ICD-10-CM

## 2022-04-08 RX ORDER — FUROSEMIDE 20 MG/1
TABLET ORAL
Qty: 30 TABLET | Refills: 5 | Status: SHIPPED | OUTPATIENT
Start: 2022-04-08

## 2022-04-11 ENCOUNTER — TELEPHONE (OUTPATIENT)
Dept: PULMONOLOGY | Facility: MEDICAL CENTER | Age: 52
End: 2022-04-11

## 2022-04-28 ENCOUNTER — HOSPITAL ENCOUNTER (OUTPATIENT)
Dept: NON INVASIVE DIAGNOSTICS | Facility: HOSPITAL | Age: 52
Discharge: HOME | End: 2022-04-28
Attending: INTERNAL MEDICINE
Payer: COMMERCIAL

## 2022-04-28 VITALS
WEIGHT: 267 LBS | DIASTOLIC BLOOD PRESSURE: 71 MMHG | HEIGHT: 70 IN | HEART RATE: 68 BPM | BODY MASS INDEX: 38.22 KG/M2 | SYSTOLIC BLOOD PRESSURE: 128 MMHG

## 2022-04-28 DIAGNOSIS — Z72.89 ALCOHOL USE: ICD-10-CM

## 2022-04-28 DIAGNOSIS — G47.33 OSA (OBSTRUCTIVE SLEEP APNEA): ICD-10-CM

## 2022-04-28 DIAGNOSIS — I42.0 DILATED CARDIOMYOPATHY (HCC): ICD-10-CM

## 2022-04-28 DIAGNOSIS — R06.00 DOE (DYSPNEA ON EXERTION): ICD-10-CM

## 2022-04-28 DIAGNOSIS — I27.21 SECONDARY PULMONARY ARTERIAL HYPERTENSION (HCC): ICD-10-CM

## 2022-04-28 DIAGNOSIS — I50.42 CHRONIC COMBINED SYSTOLIC AND DIASTOLIC CONGESTIVE HEART FAILURE, NYHA CLASS 2 (HCC): ICD-10-CM

## 2022-04-28 DIAGNOSIS — J43.2 CENTRILOBULAR EMPHYSEMA (HCC): ICD-10-CM

## 2022-04-28 DIAGNOSIS — I25.10 CORONARY ARTERY DISEASE INVOLVING NATIVE CORONARY ARTERY OF NATIVE HEART WITHOUT ANGINA PECTORIS: ICD-10-CM

## 2022-04-28 DIAGNOSIS — I50.43 ACUTE ON CHRONIC COMBINED SYSTOLIC AND DIASTOLIC CHF (CONGESTIVE HEART FAILURE) (HCC): ICD-10-CM

## 2022-04-28 DIAGNOSIS — E78.5 DYSLIPIDEMIA: ICD-10-CM

## 2022-04-28 PROCEDURE — 93306 TTE W/DOPPLER COMPLETE: CPT

## 2022-04-29 ENCOUNTER — TELEPHONE (OUTPATIENT)
Dept: CARDIOLOGY CLINIC | Facility: CLINIC | Age: 52
End: 2022-04-29

## 2022-04-29 LAB
ALBUMIN SERPL-MCNC: 4.5 G/DL (ref 3.8–4.9)
ALBUMIN/GLOB SERPL: 2.4 {RATIO} (ref 1.2–2.2)
ALP SERPL-CCNC: 74 IU/L (ref 44–121)
ALT SERPL-CCNC: 25 IU/L (ref 0–44)
AORTIC ROOT: 3 CM
AST SERPL-CCNC: 22 IU/L (ref 0–40)
BASOPHILS # BLD AUTO: 0.1 X10E3/UL (ref 0–0.2)
BASOPHILS NFR BLD AUTO: 1 %
BILIRUB SERPL-MCNC: 0.2 MG/DL (ref 0–1.2)
BUN SERPL-MCNC: 19 MG/DL (ref 6–24)
BUN/CREAT SERPL: 16 (ref 9–20)
CALCIUM SERPL-MCNC: 9.4 MG/DL (ref 8.7–10.2)
CHLORIDE SERPL-SCNC: 103 MMOL/L (ref 96–106)
CHOLEST SERPL-MCNC: 158 MG/DL (ref 100–199)
CHOLEST/HDLC SERPL: 4.1 RATIO (ref 0–5)
CO2 SERPL-SCNC: 24 MMOL/L (ref 20–29)
CREAT SERPL-MCNC: 1.16 MG/DL (ref 0.76–1.27)
E WAVE DECELERATION TIME: 183 MS
EGFR: 75 ML/MIN/1.73
EOSINOPHIL # BLD AUTO: 0.3 X10E3/UL (ref 0–0.4)
EOSINOPHIL NFR BLD AUTO: 4 %
ERYTHROCYTE [DISTWIDTH] IN BLOOD BY AUTOMATED COUNT: 12.7 % (ref 11.6–15.4)
FRACTIONAL SHORTENING: 31 % (ref 28–44)
GLOBULIN SER-MCNC: 1.9 G/DL (ref 1.5–4.5)
GLUCOSE SERPL-MCNC: 109 MG/DL (ref 65–99)
HCT VFR BLD AUTO: 43.1 % (ref 37.5–51)
HDLC SERPL-MCNC: 39 MG/DL
HGB BLD-MCNC: 14.8 G/DL (ref 13–17.7)
IMM GRANULOCYTES # BLD: 0 X10E3/UL (ref 0–0.1)
IMM GRANULOCYTES NFR BLD: 0 %
INTERVENTRICULAR SEPTUM IN DIASTOLE (PARASTERNAL SHORT AXIS VIEW): 1.2 CM
INTERVENTRICULAR SEPTUM: 1.2 CM (ref 0.58–1.09)
LAAS-AP2: 20.3 CM2
LAAS-AP4: 23.3 CM2
LDLC SERPL CALC-MCNC: 71 MG/DL (ref 0–99)
LDLC SERPL DIRECT ASSAY-MCNC: 71 MG/DL (ref 0–99)
LEFT ATRIUM SIZE: 4.3 CM
LEFT INTERNAL DIMENSION IN SYSTOLE: 3.6 CM (ref 6.71–10.17)
LEFT VENTRICULAR INTERNAL DIMENSION IN DIASTOLE: 5.2 CM (ref 11.41–17)
LEFT VENTRICULAR POSTERIOR WALL IN END DIASTOLE: 1.2 CM (ref 0.57–1.08)
LEFT VENTRICULAR STROKE VOLUME: 76 ML
LVSV (TEICH): 76 ML
LYMPHOCYTES # BLD AUTO: 1.9 X10E3/UL (ref 0.7–3.1)
LYMPHOCYTES NFR BLD AUTO: 21 %
MCH RBC QN AUTO: 30.5 PG (ref 26.6–33)
MCHC RBC AUTO-ENTMCNC: 34.3 G/DL (ref 31.5–35.7)
MCV RBC AUTO: 89 FL (ref 79–97)
MONOCYTES # BLD AUTO: 0.7 X10E3/UL (ref 0.1–0.9)
MONOCYTES NFR BLD AUTO: 8 %
MV E'TISSUE VEL-LAT: 12 CM/S
MV E'TISSUE VEL-SEP: 8 CM/S
MV PEAK A VEL: 0.99 M/S
MV PEAK E VEL: 91 CM/S
MV STENOSIS PRESSURE HALF TIME: 53 MS
MV VALVE AREA P 1/2 METHOD: 4.15 CM2
NEUTROPHILS # BLD AUTO: 6 X10E3/UL (ref 1.4–7)
NEUTROPHILS NFR BLD AUTO: 66 %
PLATELET # BLD AUTO: 243 X10E3/UL (ref 150–450)
POTASSIUM SERPL-SCNC: 4.9 MMOL/L (ref 3.5–5.2)
PROT SERPL-MCNC: 6.4 G/DL (ref 6–8.5)
RA PRESSURE ESTIMATED: 8 MMHG
RBC # BLD AUTO: 4.85 X10E6/UL (ref 4.14–5.8)
RIGHT VENTRICLE ID DIMENSION: 3.2 CM
RV PSP: 35 MMHG
SL AMB VLDL CHOLESTEROL CALC: 48 MG/DL (ref 5–40)
SL CV LEFT ATRIUM LENGTH A2C: 5.5 CM
SL CV LV EF: 55
SL CV PED ECHO LEFT VENTRICLE DIASTOLIC VOLUME (MOD BIPLANE) 2D: 132 ML
SL CV PED ECHO LEFT VENTRICLE SYSTOLIC VOLUME (MOD BIPLANE) 2D: 56 ML
SODIUM SERPL-SCNC: 143 MMOL/L (ref 134–144)
TR MAX PG: 27 MMHG
TR PEAK VELOCITY: 2.6 M/S
TRICUSPID VALVE PEAK REGURGITATION VELOCITY: 2.59 M/S
TRIGL SERPL-MCNC: 301 MG/DL (ref 0–149)
TSH SERPL DL<=0.005 MIU/L-ACNC: 3.84 UIU/ML (ref 0.45–4.5)
WBC # BLD AUTO: 8.9 X10E3/UL (ref 3.4–10.8)
Z-SCORE OF INTERVENTRICULAR SEPTUM IN END DIASTOLE: 2.8
Z-SCORE OF LEFT VENTRICULAR DIMENSION IN END DIASTOLE: -9.85
Z-SCORE OF LEFT VENTRICULAR DIMENSION IN END SYSTOLE: -6.57
Z-SCORE OF LEFT VENTRICULAR POSTERIOR WALL IN END DIASTOLE: 2.91

## 2022-04-29 PROCEDURE — 93306 TTE W/DOPPLER COMPLETE: CPT | Performed by: INTERNAL MEDICINE

## 2022-04-29 NOTE — TELEPHONE ENCOUNTER
----- Message from Ar Triana MD sent at 4/29/2022 10:37 AM EDT -----  Patient blood test shows his cholesterol is acceptable  Other electrolytes are acceptable but triglycerides are very high but they are better than before  It used to be 500 now around 300  We would like it to be around 150  He need to cut back his carb and he should be compliant with his medications

## 2022-05-02 ENCOUNTER — TELEPHONE (OUTPATIENT)
Dept: CARDIOLOGY CLINIC | Facility: CLINIC | Age: 52
End: 2022-05-02

## 2022-05-02 NOTE — TELEPHONE ENCOUNTER
----- Message from Roland Reyes MD sent at 4/29/2022  5:13 PM EDT -----  Patient's echo shows normal LV systolic function  No significant, to mild valvular disease  Patient can keep an appointment  Please call patient about echo report

## 2022-06-02 ENCOUNTER — OFFICE VISIT (OUTPATIENT)
Dept: PULMONOLOGY | Facility: MEDICAL CENTER | Age: 52
End: 2022-06-02
Payer: COMMERCIAL

## 2022-06-02 VITALS
SYSTOLIC BLOOD PRESSURE: 144 MMHG | OXYGEN SATURATION: 96 % | WEIGHT: 273.8 LBS | HEART RATE: 81 BPM | DIASTOLIC BLOOD PRESSURE: 86 MMHG | HEIGHT: 70 IN | BODY MASS INDEX: 39.2 KG/M2 | TEMPERATURE: 98.6 F | RESPIRATION RATE: 12 BRPM

## 2022-06-02 DIAGNOSIS — I50.43 ACUTE ON CHRONIC COMBINED SYSTOLIC AND DIASTOLIC CHF (CONGESTIVE HEART FAILURE) (HCC): ICD-10-CM

## 2022-06-02 DIAGNOSIS — Z72.89 ALCOHOL USE: ICD-10-CM

## 2022-06-02 DIAGNOSIS — I50.42 CHRONIC COMBINED SYSTOLIC AND DIASTOLIC CONGESTIVE HEART FAILURE, NYHA CLASS 2 (HCC): ICD-10-CM

## 2022-06-02 DIAGNOSIS — R06.00 DOE (DYSPNEA ON EXERTION): ICD-10-CM

## 2022-06-02 DIAGNOSIS — I27.21 SECONDARY PULMONARY ARTERIAL HYPERTENSION (HCC): ICD-10-CM

## 2022-06-02 DIAGNOSIS — G47.33 OSA (OBSTRUCTIVE SLEEP APNEA): ICD-10-CM

## 2022-06-02 DIAGNOSIS — E66.01 CLASS 2 SEVERE OBESITY WITH BODY MASS INDEX (BMI) OF 35 TO 39.9 WITH SERIOUS COMORBIDITY (HCC): ICD-10-CM

## 2022-06-02 DIAGNOSIS — I42.0 DILATED CARDIOMYOPATHY (HCC): ICD-10-CM

## 2022-06-02 DIAGNOSIS — Z87.891 FORMER SMOKER: Primary | ICD-10-CM

## 2022-06-02 DIAGNOSIS — I25.10 CORONARY ARTERY DISEASE INVOLVING NATIVE CORONARY ARTERY OF NATIVE HEART WITHOUT ANGINA PECTORIS: ICD-10-CM

## 2022-06-02 DIAGNOSIS — E78.5 DYSLIPIDEMIA: ICD-10-CM

## 2022-06-02 PROCEDURE — 3008F BODY MASS INDEX DOCD: CPT | Performed by: INTERNAL MEDICINE

## 2022-06-02 PROCEDURE — 94010 BREATHING CAPACITY TEST: CPT | Performed by: INTERNAL MEDICINE

## 2022-06-02 PROCEDURE — 3079F DIAST BP 80-89 MM HG: CPT | Performed by: INTERNAL MEDICINE

## 2022-06-02 PROCEDURE — 1036F TOBACCO NON-USER: CPT | Performed by: INTERNAL MEDICINE

## 2022-06-02 PROCEDURE — 99214 OFFICE O/P EST MOD 30 MIN: CPT | Performed by: INTERNAL MEDICINE

## 2022-06-02 PROCEDURE — 3077F SYST BP >= 140 MM HG: CPT | Performed by: INTERNAL MEDICINE

## 2022-06-02 NOTE — PATIENT INSTRUCTIONS
I will order auto-cpap machine from [facility]    Use albuterol inhaler 2 puffs 4 times a day as needed for wheezing or shortness of breath    If I cannot order CPAP machine without a new study I can try to order home sleep test again    If you do get new CPAP machine without have to have another sleep study please contact us as you will need a compliance visit 30-60 days later    Compliance criteria:  Need to use CPAP for average of 4 hours for per night and 21 at of 30 days to keep machine    [facility]   [phone]

## 2022-06-13 DIAGNOSIS — I10 UNCONTROLLED HYPERTENSION: ICD-10-CM

## 2022-06-13 RX ORDER — CARVEDILOL 25 MG/1
25 TABLET ORAL 2 TIMES DAILY WITH MEALS
Qty: 180 TABLET | Refills: 0 | Status: SHIPPED | OUTPATIENT
Start: 2022-06-13

## 2022-06-15 DIAGNOSIS — F33.9 EPISODE OF RECURRENT MAJOR DEPRESSIVE DISORDER, UNSPECIFIED DEPRESSION EPISODE SEVERITY (HCC): ICD-10-CM

## 2022-06-16 RX ORDER — BUPROPION HYDROCHLORIDE 150 MG/1
150 TABLET ORAL EVERY MORNING
Qty: 30 TABLET | Refills: 0 | Status: SHIPPED | OUTPATIENT
Start: 2022-06-16

## 2022-06-17 ENCOUNTER — TELEPHONE (OUTPATIENT)
Dept: PULMONOLOGY | Facility: CLINIC | Age: 52
End: 2022-06-17

## 2022-06-17 NOTE — TELEPHONE ENCOUNTER
Pt calling saying he was in the office on 5/26 and was told Dr Shira Amaya was going to send his cpap order but he still has not heard anything  Please advise and call patient with an update

## 2022-06-20 NOTE — ASSESSMENT & PLAN NOTE
Keith Daniels has severe obstructive sleep apnea as noted on home sleep study done 06/25/2020  Overall AHI was 44 1 this increased to 65 1 in a supine position  Also had some hypoxemia throughout the night  He is agreeable to auto CPAP therapy I [facility]   He will contact our office once he gets his new CPAP machine and I did explain compliance criteria for him  He will he did have follow-up compliance visit 30-60 days after he has his CPAP machine delivered    I did place some auto CPAP setting of 6-20 cm water

## 2022-06-20 NOTE — ASSESSMENT & PLAN NOTE
He is obese    I did talk about diet and trying to lose weight as this would help his overall health I would also help with with his obstructive sleep apnea

## 2022-06-20 NOTE — PROGRESS NOTES
Assessment/Plan        Problem List Items Addressed This Visit        Respiratory    PARKER (obstructive sleep apnea)     Soni Stark has severe obstructive sleep apnea as noted on home sleep study done 06/25/2020  Overall AHI was 44 1 this increased to 65 1 in a supine position  Also had some hypoxemia throughout the night  He is agreeable to auto CPAP therapy I will order auto CPAP for him through [facility]   He will contact our office once he gets his new CPAP machine and I did explain compliance criteria for him  He will he did have follow-up compliance visit 30-60 days after he has his CPAP machine delivered  I did place some auto CPAP setting of 6-20 cm water              Cardiovascular and Mediastinum    Secondary pulmonary arterial hypertension ([facility] [address] )    CAD (coronary artery disease)    [facility]     He was diagnosed with cardiomyopathy back in May of 2020  That time his left ventricle ejection fraction only 20 30% and had cardiac catheterization which showed noncritical coronary disease  A follow-up echocardiogram done recently on April 28, 2022 showed that the left ventricular function is improved significantly left ventricular EF was 55%  Head mild mitral regurgitation and right ventricular systolic pressure was 35 mm Hg              Other    ZAYAS (dyspnea on exertion)    Alcohol use    Former smoker - Primary     He does have history of smoking 1 pack per day for 25 years and quit smoking in May 2014  Spirometry today shows mild restrictive pair likely due to him being overweight  No airflow obstruction  He has not been using Incruse was given before nor does he need to use any albuterol inhaler  Not having any wheezing or chronic cough           Relevant Orders    POCT spirometry (Completed)    Class 2 severe obesity with body mass index (BMI) of 35 to 39 9 with serious comorbidity ([facility] )     He is obese    I did talk about diet and trying to lose weight as this would help his overall health I would also help with with his obstructive sleep apnea             Other Visit Diagnoses     Chronic combined systolic and diastolic congestive heart failure, NYHA class 2 (HCC)        Dyslipidemia        Acute on chronic combined systolic and diastolic CHF (congestive heart failure) ([facility] )                Cc:  Does have some daytime fatigue  SHAJI Inman has history of hypertension and chronic combined systolic and diastolic congestive heart failure  He did have hospitalization back in May of 2020 he presented to the ER with shortness of breath was found to have systolic blood pressure of greater than 200  He did have some hypoxemia and was treated with BiPAP help with his shortness of breath  He did have cardiac catheterization done that time which showed mild nonobstructive coronary disease and left ventricular end-diastolic pressure was elevated 25 mm Hg  Sleep screen was on 05/04/2020 for his hospital stay and this revealed 199 obstructive apneas, 32 central apneas, 14 mixed apneas and 174 obstructive hypopneas  Overall AHI was 62 1 consistent with severe PARKER  His average O2 saturation room air was 87% with erica 60%  He had 173 minutes less than or equal to 88% O2 saturation  He started study on room air but by 1:00 a m  he was placed on oxygen 3 liters/minute because of his hypoxemia  His echocardiogram done May 2nd showed left ventricular ejection fraction be decreased to 20-30%  Estimated pulmonary pressure was 45-50 mm Hg  No significant valvular heart disease    A follow-up echocardiogram done on 02/77/9921 showed LV systolic function to be improved ejection fraction of 55%  There is mild mitral regurgitation and right ventricular systolic pressure was normal at 35 mm Hg    He did have a home sleep study done 06/25/2020  This showed he had a steroid 29% of the time and overall AHI was elevated 44 1 and this increased to 65 1 in supine position    He had 115 obstructive apneas, 217 hypopneas  This is consistent with severe PARKER  His average O2 saturation was 92%  Oxygen erica 74%  He spent 23% of time less than 90% O2 saturation and 1 8% of time less than 85% O2 saturation  He weighed 230 lb at the time of this sleep study and he slept about 50% of time in a supine position  He has gained weight since then and now weighs 273 lbs    Does have history of smoking 1 pack of cigarettes per day for 25 years and quit smoking in May 2014  Does have some mild shortness of breath with activity  Past Medical History:   Diagnosis Date    Acute hypoxemic respiratory failure (HCC)     Anxiety     Cardiomy[facility] [address] )     [facility] 5/[facility]2020    CH[facility]congestive [facility] failure) [facility])     55% no pacer needed    Combined systolic and diastolic cardiac dysfunction     Coronary artery disease     Depression     Hypertension     Mixed sleep apnea     Obesity     [facility] pulmonary [facility] hypertension [facility]     Substance abuse ([facility] )     marijuana cookie some time for sleeping       Volume overload        Past Surgical History:   Procedure Laterality Date    CHOLECYSTECTOMY      FRACTURE SURGERY      jaw    KNEE SURGERY      MANDIBLE SURGERY      TONSILLECTOMY           Current Outpatient Medications:     aspirin 81 mg chewable tablet, Chew 1 tablet (81 mg total) daily, Disp: 30 tablet, Rfl: 0    atorvastatin (LIPITOR) 40 mg tablet, Take 1 tablet (40 mg total) by mouth daily with dinner, Disp: 90 tablet, Rfl: 3    furosemide (LASIX) 20 mg tablet, take 1 tablet by mouth once daily, Disp: 30 tablet, Rfl: 5    levothyroxine 50 mcg tablet, Take 0 5 tablets (25 mcg total) by mouth daily in the early morning, Disp: 90 tablet, Rfl: 3    lisinopril (ZESTRIL) 20 mg tablet, Take 1 tablet (20 mg total) by mouth daily, Disp: 90 tablet, Rfl: 3    Omeprazole-Sodium Bicarbonate (ZEGERID OTC PO), Take by mouth daily, Disp: , Rfl:     spironolactone (ALDACTONE) 25 mg tablet, Take 0 5 tablets (12 5 mg total) by mouth every other day, Disp: 45 tablet, Rfl: 3    acetaminophen (TYLENOL) 325 mg tablet, Take 650 mg by mouth every 6 (six) hours as needed for mild pain or fever, Disp: , Rfl:     albuterol (ProAir HFA) 90 mcg/act inhaler, Inhale 2 puffs every 4 (four) hours as needed for wheezing or shortness of breath (Patient not taking: Reported on 2021), Disp: 8 5 g, Rfl: 0    buPROPion (WELLBUTRIN XL) 150 mg 24 hr tablet, Take 1 tablet (150 mg total) by mouth every morning, Disp: 30 tablet, Rfl: 0    carvedilol (COREG) 25 mg tablet, Take 1 tablet (25 mg total) by mouth 2 (two) times a day with meals, Disp: 180 tablet, Rfl: 0    famotidine (PEPCID) 20 mg tablet, Take 20 mg by mouth 2 (two) times a day (Patient not taking: No sig reported), Disp: , Rfl:     multivitamin-minerals (CENTRUM ADULTS) tablet, Take 1 tablet by mouth daily (Patient not taking: Reported on 2021), Disp: 30 tablet, Rfl: 0    umeclidinium bromide (INCRUSE ELLIPTA) 62 5 mcg/inh AEPB inhaler, Inhale 1 puff daily (Patient not taking: Reported on 2022 ), Disp: 1 Inhaler, Rfl: 0    Allergies   Allergen Reactions    Cheese - Food Allergy      Fever, nasal congestion    Chocolate - Food Allergy      Fever, nasal congestion    Nuts - Food Allergy      Fever, nasal congestion       Social History     Tobacco Use    Smoking status: Former Smoker     Packs/day: 1      Years: 25 00     Pack years: 25 00     Types: Cigarettes     Quit date: 2014     Years since quittin 1    Smokeless tobacco: Never Used   Substance Use Topics    Alcohol use:  Yes     Alcohol/week: 2 0 standard drinks     Types: 2 Cans of beer per week     Comment: Last drink          Family History   Problem Relation Age of Onset    Heart disease Father     Cancer Father     Cancer Mother     Colon cancer Mother     No Known Problems Sister        Review of Systems   Constitutional: Negative for chills, fever and unexpected weight change  HENT: Negative for congestion, rhinorrhea and sore throat  Eyes: Negative for discharge and redness  Respiratory:        Has some mild shortness of breath with activity   Cardiovascular: Negative for chest pain, palpitations and leg swelling  Gastrointestinal: Negative for abdominal distention, abdominal pain and nausea  Endocrine: Negative for polydipsia and polyphagia  Genitourinary: Negative for dysuria  Musculoskeletal: Negative for joint swelling and myalgias  Skin: Negative for rash  Neurological: Negative for light-headedness  Psychiatric/Behavioral: Negative for confusion  Height 5 ft 10 in tall weight 273 lb BMI 39 29    Vitals:    06/02/22 1116   BP: 144/86   Pulse: 81   Resp: 12   Temp: 98 6 °F (37 °C)   SpO2: 96%           Physical Exam  Vitals reviewed  Constitutional:       General: He is not in acute distress  Appearance: Normal appearance  He is well-developed  HENT:      Head: Normocephalic  Right Ear: External ear normal       Left Ear: External ear normal       Nose: Nose normal       Mouth/Throat:      Mouth: Mucous membranes are moist       Pharynx: Oropharynx is clear  No oropharyngeal exudate  Eyes:      Conjunctiva/sclera: Conjunctivae normal       Pupils: Pupils are equal, round, and reactive to light  Cardiovascular:      Rate and Rhythm: Normal rate and regular rhythm  Heart sounds: Normal heart sounds  Pulmonary:      Effort: Pulmonary effort is normal       Comments: Lung sounds are clear  No wheezes, crackles, or rhonchi  Abdominal:      General: There is no distension  Palpations: Abdomen is soft  Tenderness: There is no abdominal tenderness  Musculoskeletal:      Cervical back: Neck supple  Comments: No edema   Lymphadenopathy:      Cervical: No cervical adenopathy  Skin:     General: Skin is warm and dry  Neurological:      General: No focal deficit present  Mental Status: He is alert and oriented to person, place, and time  Psychiatric:         Mood and Affect: Mood normal          Behavior: Behavior normal          Thought Content:  Thought content normal            Office Spirometry Results:     FVC - 3 74 L   75%  FEV1 - 3 07 L  80%  FEV1/FVC% - 82%    Mild restrictive impairment

## 2022-06-20 NOTE — ASSESSMENT & PLAN NOTE
He does have history of smoking 1 pack per day for 25 years and quit smoking in May 2014  Spirometry today shows mild restrictive pair likely due to him being overweight  No airflow obstruction  He has not been using Incruse was given before nor does he need to use any albuterol inhaler    Not having any wheezing or chronic cough

## 2022-06-20 NOTE — ASSESSMENT & PLAN NOTE
He was diagnosed with cardiomyopathy back in May of 2020  That time his left ventricle ejection fraction only 20 30% and had cardiac catheterization which showed noncritical coronary disease  A follow-up echocardiogram done recently on April 28, 2022 showed that the left ventricular function is improved significantly left ventricular EF was 55%    Head mild mitral regurgitation and right ventricular systolic pressure was 35 mm Hg

## 2022-06-21 NOTE — TELEPHONE ENCOUNTER
Lvm w/ pt and explained that orders are being processed  I also set appt for machine setup at our office for 7/12 at 10  I advisedfor him to call office back if appt date or time does not work

## 2022-08-02 DIAGNOSIS — I42.9 CARDIOMYOPATHY (HCC): ICD-10-CM

## 2022-08-02 LAB

## 2022-08-02 RX ORDER — ATORVASTATIN CALCIUM 40 MG/1
40 TABLET, FILM COATED ORAL
Qty: 90 TABLET | Refills: 3 | Status: SHIPPED | OUTPATIENT
Start: 2022-08-02

## 2022-08-03 LAB
PSA SERPL-MCNC: 0.3 NG/ML (ref 0–4)
TSH SERPL DL<=0.005 MIU/L-ACNC: 4.34 UIU/ML (ref 0.45–4.5)

## 2022-08-18 ENCOUNTER — TELEPHONE (OUTPATIENT)
Dept: PULMONOLOGY | Facility: MEDICAL CENTER | Age: 52
End: 2022-08-18

## 2022-09-13 ENCOUNTER — TELEPHONE (OUTPATIENT)
Dept: CARDIOLOGY CLINIC | Facility: CLINIC | Age: 52
End: 2022-09-13

## 2022-09-13 NOTE — TELEPHONE ENCOUNTER
[facility]     lisinopril (ZESTRIL) 20 mg tablet    spironolactone (ALDACTONE) 25 mg tablet    carvedilol (COREG) 25 mg tablet    Refill of the above meds  Has f/u asppt for 11/23

## 2022-09-14 DIAGNOSIS — I10 UNCONTROLLED HYPERTENSION: ICD-10-CM

## 2022-09-14 DIAGNOSIS — I50.42 CHRONIC COMBINED SYSTOLIC AND DIASTOLIC CONGESTIVE HEART FAILURE, NYHA CLASS 2 (HCC): ICD-10-CM

## 2022-09-14 DIAGNOSIS — I50.43 ACUTE ON CHRONIC COMBINED SYSTOLIC AND DIASTOLIC CHF (CONGESTIVE HEART FAILURE) (HCC): ICD-10-CM

## 2022-09-14 RX ORDER — SPIRONOLACTONE 25 MG/1
12.5 TABLET ORAL EVERY OTHER DAY
Qty: 45 TABLET | Refills: 3 | Status: SHIPPED | OUTPATIENT
Start: 2022-09-14

## 2022-09-14 RX ORDER — LISINOPRIL 20 MG/1
20 TABLET ORAL DAILY
Qty: 90 TABLET | Refills: 3 | Status: SHIPPED | OUTPATIENT
Start: 2022-09-14

## 2022-09-14 RX ORDER — CARVEDILOL 25 MG/1
25 TABLET ORAL 2 TIMES DAILY WITH MEALS
Qty: 180 TABLET | Refills: 3 | Status: SHIPPED | OUTPATIENT
Start: 2022-09-14

## 2022-11-22 ENCOUNTER — OFFICE VISIT (OUTPATIENT)
Dept: CARDIOLOGY CLINIC | Facility: CLINIC | Age: 52
End: 2022-11-22

## 2022-11-22 VITALS
TEMPERATURE: 97 F | OXYGEN SATURATION: 97 % | HEART RATE: 62 BPM | HEIGHT: 70 IN | SYSTOLIC BLOOD PRESSURE: 150 MMHG | DIASTOLIC BLOOD PRESSURE: 90 MMHG | BODY MASS INDEX: 36.51 KG/M2 | WEIGHT: 255 LBS

## 2022-11-22 DIAGNOSIS — I25.10 CORONARY ARTERY DISEASE INVOLVING NATIVE CORONARY ARTERY OF NATIVE HEART WITHOUT ANGINA PECTORIS: ICD-10-CM

## 2022-11-22 DIAGNOSIS — E78.5 DYSLIPIDEMIA: ICD-10-CM

## 2022-11-22 DIAGNOSIS — I10 BENIGN ESSENTIAL HYPERTENSION: ICD-10-CM

## 2022-11-22 DIAGNOSIS — G47.33 OSA (OBSTRUCTIVE SLEEP APNEA): ICD-10-CM

## 2022-11-22 DIAGNOSIS — Z78.9 ALCOHOL USE: ICD-10-CM

## 2022-11-22 DIAGNOSIS — J43.2 CENTRILOBULAR EMPHYSEMA (HCC): ICD-10-CM

## 2022-11-22 DIAGNOSIS — I50.42 CHRONIC COMBINED SYSTOLIC AND DIASTOLIC HEART FAILURE, NYHA CLASS 2 (HCC): ICD-10-CM

## 2022-11-22 RX ORDER — SPIRONOLACTONE 25 MG/1
12.5 TABLET ORAL DAILY
Qty: 45 TABLET | Refills: 3 | Status: SHIPPED | OUTPATIENT
Start: 2022-11-22

## 2022-11-22 RX ORDER — ATORVASTATIN CALCIUM 40 MG/1
40 TABLET, FILM COATED ORAL
Qty: 90 TABLET | Refills: 3 | Status: SHIPPED | OUTPATIENT
Start: 2022-11-22

## 2022-11-22 NOTE — PROGRESS NOTES
Progress Note - Cardiology Office  [facility]    David Centeno 47 y o  male MRN: [id number]  : 1970  Encounter: [id number]      Assessment:     1  Chronic combined systolic and diastolic heart failure, NYHA class 2 ([facility] [address] )    2  Coronary artery disease involving native coronary artery of native heart without angina pectoris    3  Benign essential hypertension    4  Centrilobular emphysema ([facility] [address] )    5  PARKER (obstructive sleep apnea)    6  Alcohol use    7  Dyslipidemia        Discussion Summary and Plan:  1  Combined systolic and diastolic heart failure with dilated cardiomyopathy  Patient EF was found to be 25- 30%  Last echo in  shows his EF is improved  Will continue Coreg Aldactone lisinopril  No evidence of volume overload    2  Nonischemic cardiomyopathy  Most likely etiology longstanding uncontrolled hypertension in the setting of LBBB and alcohol abuse  Cardiac catheterization now evidence of obstructive CAD  Continue medical Rx  Advised him to be compliant with medications     3  CKD with history of kidney injury  Last blood test creatinine was 1 16 in 2020 check BMP     4  Dyspnea on exertion  He had chronic exertional shortness of breath which is more pronounced he used to be heavy smoker he has quit smoking now  Possible etiology is probably underlying obesity  Need to lose weight discussed with patient  Discussed with patient    5  History of obstructive sleep apnea  Says he is using CPAP machine he is not taking inhalers follows with Pulmonary     6  Dyslipidemia on statin  He does not take his statin all the time  Encouraged him to be compliant with his Lipitor prescriptions renewed      7  Previous history of alcohol abuse and tobacco abuse  He still drinks  Advised him to stop drinking otherwise high risk for development and worsening of cardiomyopathy  This was discussed with him again  Discussed with patient  Again      8  Obesity with BMI 38    Current BMI now around 37 encouraged him to lose more weight    9  Hypertension  Patient blood pressure is elevated around 511 systolic today  He was upset his medication reviewed  Currently he is taking Coreg 25 twice a day, lisinopril 20 mg twice a day  Lasix 20 mg daily spironolactone 12 5 every other day  Will make spironolactone  12 5 mg daily  Check CMP  Continue current Rx follow-up 4 to 6 months  Please call [phone] if any questions  Counseling :  A description of the counseling  Goals and Barriers  Patient's ability to self care: Yes  Medication side effect reviewed with patient in detail and all their questions answered to their satisfaction  HPI :     Jeimy Young is a 47y o  year old male who came for follow up  Patient was recently admitted to [facility] with shortness of breath  He was diagnosed to have pulmonary edema later non workup shows that he had cardiomyopathy with EF around 20-30% with paradoxical septal motion secondary to his LBBB  During workup of also found to have hypertension, LBBB, history of emphysema/COPD  He used to smoke heavily quit 6 years ago  He used to drink also now he drinks occasionally  He underwent cardiac catheterization found to have mild nonobstructive disease and was started on medical therapy  He has a defibrillator vest   No ICD shocks  Since hospitalization he has lost about 30 lb  He is breathing much better there is no leg edema no nausea no vomiting no other issues  He feels his doing well much better than before  07/27/2021  Above reviewed  Patient came for follow-up  He had history of cardiomyopathy with EF at time used to be 30% which has improved to 55%  He had LBBB at that time  His QRS is now narrow  He does have history of COPD, emphysema, sleep apnea and BMI still remains around 38  He has gained his weight back  He denies any chest pain any shortness of breath    He admits he does not take his medication at particularly cholesterol the time he misses some does he is back to drinking but not same amount  He has recent blood test done which shows his thyroid function is abnormal and his triglyceride is significantly elevated and his cholesterol has also increased  I believe he is not taking his Lipitor as prescribed and he may have hypothyroidism  History of sleep apnea but could not use CPAP machine  No nausea no vomiting no other issues at this time  His labs reviewed with him  04/07/2022  Above reviewed  Patient came for follow-up  Patient had history of cardiomyopathy and EF used to be around 35% which improved to 55%  He had history of COPD, emphysema, sleep apnea, obesity with BMI around 38, history of alcohol abuse who came for follow-up  His last blood test was in July of 2021 is triglycerides were still elevated  They were in 500 range  He had stopped taking his medications he has no blood test since August 2021 at that time his electrolytes were acceptable  He does have history of sleep apnea but he could not use CPAP machine  He is taking Aldactone every other day, Lasix 20 mg daily along with Zestril and carvedilol  Unfortunately since last visit he has gained some weight as he lost his job and he has been at home not exercising  He is motivated to lose weight  His EKG shows heart rate 77 beats per minute  He used to have LBBB currently his QRS is narrow  He used to weigh around 225 when he was doing best now he is 267  He understands it and is working on it       11/22/2022  Above reviewed  Patient came for follow-up  History of cardiomyopathy with EF used to be 35%, history of COPD emphysema, sleep apnea obesity with BMI now around 37, history of alcohol abuse who came for follow-up  He says he is feeling well  He had blood test done in April 2022 triglycerides still elevated  He used to drink heavily he has cut back but though not completely size stop    He is using his CPAP machine  At his request the medication which he is not using there were removed from his list   He is not using any inhalers  He is otherwise feeling well  He said he got upset with our office today as he was told that he is not on the appointment  His weight is 255 lb which is less  He is trying to cut back his drinking advised him to eat low potassium 4  Repeat BMP will be ordered  EKG reviewed with him        Review of Systems   Constitutional: Negative for activity change, chills, diaphoresis, fever and unexpected weight change  HENT: Negative for congestion  Eyes: Negative for discharge and redness  Respiratory: Negative for cough, chest tightness, shortness of breath and wheezing  Cardiovascular: Negative  Negative for chest pain, palpitations and leg swelling  Gastrointestinal: Negative for abdominal pain, diarrhea and nausea  Endocrine: Negative  Genitourinary: Negative for decreased urine volume and urgency  Musculoskeletal: Negative  Negative for arthralgias, back pain and gait problem  Skin: Negative for rash and wound  Allergic/Immunologic: Negative  Neurological: Negative for dizziness, seizures, syncope, weakness, light-headedness and headaches  Hematological: Negative  Psychiatric/Behavioral: Negative for agitation and confusion  The patient is not nervous/anxious  Historical Information   Past Medical History:   Diagnosis Date   • Acute hypoxemic respiratory failure (HCC)    • Anxiety    • Cardiomyopathy ([facility] [address] )    • Centrilobular emphysema ([facility] [address] ) 5/20/2020   • CHF (congestive heart failure) (HCC)     55% no pacer needed   • Combined systolic and diastolic cardiac dysfunction    • Coronary artery disease    • Depression    • Hypertension    • Mixed sleep apnea    • Obesity    • Secondary pulmonary arterial hypertension (HCC)    • Substance abuse ([facility] [address] )     marijuana cookie some time for sleeping      • Volume overload      Past Surgical History:   Procedure Laterality Date   • CHOLECYSTECTOMY     • FRACTURE SURGERY      jaw   • KNEE SURGERY     • MANDIBLE SURGERY     • TONSILLECTOMY       Social History     Substance and Sexual Activity   Alcohol Use Yes   • Alcohol/week: 2 0 standard drinks   • Types: 2 Cans of beer per week    Comment: Last drink      Social History     Substance and Sexual Activity   Drug Use Yes   • Types: Marijuana    Comment: pot cookie once in a while for sleeping      Social History     Tobacco Use   Smoking Status Former   • Packs/day:    • Years:    • Pack years:    • Types: Cigarettes   • Quit date: 2014   • Years since quittin 5   Smokeless Tobacco Never     Family History:   Family History   Problem Relation Age of Onset   • Heart disease Father    • Cancer Father    • Cancer Mother    • Colon cancer Mother    • No Known Problems Sister        Meds/Allergies     Allergies   Allergen Reactions   • Cheese - Food Allergy      Fever, nasal congestion   • Chocolate - Food Allergy      Fever, nasal congestion   • Nuts - Food Allergy      Fever, nasal congestion       Current Outpatient Medications:   •  aspirin 81 mg chewable tablet, Chew 1 tablet (81 mg total) daily, Disp: 30 tablet, Rfl: 0  •  atorvastatin (LIPITOR) 40 mg tablet, Take 1 tablet (40 mg total) by mouth daily with dinner, Disp: 90 tablet, Rfl: 3  •  carvedilol (COREG) 25 mg tablet, Take 1 tablet (25 mg total) by mouth 2 (two) times a day with meals, Disp: 180 tablet, Rfl: 3  •  furosemide (LASIX) 20 mg tablet, take 1 tablet by mouth once daily, Disp: 30 tablet, Rfl: 5  •  lisinopril (ZESTRIL) 20 mg tablet, Take 1 tablet (20 mg total) by mouth daily, Disp: 90 tablet, Rfl: 3  •  Omeprazole-Sodium Bicarbonate (ZEGERID OTC PO), Take by mouth daily, Disp: , Rfl:   •  spironolactone (ALDACTONE) 25 mg tablet, Take 0 5 tablets (12 5 mg total) by mouth daily, Disp: 45 tablet, Rfl: 3  •  famotidine (PEPCID) 20 mg tablet, Take 20 mg by mouth 2 (two) times a day (Patient not taking: Reported on 7/27/2021), Disp: , Rfl:   •  levothyroxine 50 mcg tablet, Take 0 5 tablets (25 mcg total) by mouth daily in the early morning (Patient not taking: Reported on 11/22/2022), Disp: 90 tablet, Rfl: 3    Vitals: Blood pressure 150/90, pulse 62, temperature (!) 97 °F (36 1 °C), height 5' 10" (1 778 m), weight 116 kg (255 lb), SpO2 97 %  ?  Body mass index is 36 59 kg/m²  Vitals:    11/22/22 1328   Weight: 116 kg (255 lb)     BP Readings from Last 3 Encounters:   11/22/22 150/90   06/02/22 144/86   04/28/22 128/71       Physical Exam  Constitutional:       General: He is not in acute distress  Appearance: He is well-developed  He is not diaphoretic  Neck:      Thyroid: No thyromegaly  Vascular: No JVD  Trachea: No tracheal deviation  Cardiovascular:      Rate and Rhythm: Normal rate and regular rhythm  Heart sounds: S1 normal and S2 normal  Heart sounds not distant  Murmur heard  Systolic (ejection) murmur is present with a grade of 2/6  No friction rub  No gallop  No S3 or S4 sounds  Pulmonary:      Effort: Pulmonary effort is normal  No respiratory distress  Breath sounds: Normal breath sounds  No wheezing or rales  Chest:      Chest wall: No tenderness  Abdominal:      General: Bowel sounds are normal  There is no distension  Palpations: Abdomen is soft  Tenderness: There is no abdominal tenderness  Musculoskeletal:         General: No deformity  Cervical back: Neck supple  Skin:     General: Skin is warm and dry  Coloration: Skin is not pale  Findings: No rash  Neurological:      Mental Status: He is alert and oriented to person, place, and time  Psychiatric:         Behavior: Behavior normal          Judgment: Judgment normal          Diagnostic Studies Review Cardio:  Cardiac catheterization and echo report reviewed        Echo Doppler done April 2022 shows patient has EF is 55% mild valvular disease mild TR with PA pressure 35 mm Hg  EKG:  Patient has history of LBBB    Twelve lead EKG 2021 shows normal sinus rhythm heart rate 79 beats per minute QTC mildly prolonged  Twelve lead EKG 2020 shows normal sinus rhythm heart rate 82 beats per minute no change from previous EKG  milliseconds  Twelve lead EKG done on 2022:  Normal sinus rhythm heart rate 77 beats per minute Q without inferior leads most likely due to lead location poor R-wave progression no change from previous EKG QTC is 482 milliseconds  Twelve lead EKG 2022 shows normal sinus rhythm heart rate 62 beats per minute Q-wave noted in inferior leads  QTC is acceptable  Cardiac testing:   Results for orders placed during the hospital encounter of 20   Echo complete with contrast if indicated    N[address]ative [address]  [address]  [phone]    Transthoracic Echocardiogram  2D, M-mode, Doppler, and Color Doppler    Study date:  02-May-2020    Patient: Lorenzo Good  MR number: [id number]  Account number: [de-identified]  : 1970  Age: 45 years  Gender: Male  Status: Inpatient  Location: Bedside  Height: 70 in  Weight: 270 4 lb  BP: 153/ 96 mmHg    Indications: SOB    Diagnoses: I50 9 - Heart failure, unspecified    Sonographer:  PRAMOD Kaur  Referring Physician:  BRISEIDA Sifuentes  Group:  [id number]  Interpreting Physician:  Baldo Christian MD    SUMMARY    LEFT VENTRICLE:  The ventricle was mildly dilated  Systolic function was severely reduced  Ejection fraction was estimated in the range of 20 % to 30 %  There was severe diffuse hypokinesis  Wall thickness was mildly increased  There was mild concentric hypertrophy    Doppler parameters were consistent with a reversible restrictive pattern, indicative of decreased left ventricular diastolic compliance and/or increased left atrial pressure (grade 3 diastolic dysfunction)  VENTRICULAR SEPTUM:  There was moderate paradoxical motion  These changes are consistent with LBBB  RIGHT VENTRICLE:  The ventricle was mildly dilated  Systolic function was mildly reduced  LEFT ATRIUM:  The atrium was moderately dilated  RIGHT ATRIUM:  The atrium was mildly dilated  MITRAL VALVE:  There was mild regurgitation  TRICUSPID VALVE:  There was mild to moderate regurgitation  Estimated peak PA pressure was 45 to 50 mmHg  IVC, HEPATIC VEINS:  The inferior vena cava was mildly dilated  The respirophasic change in diameter was more than 50%  HISTORY: PRIOR HISTORY: HTN,Acute kidney injury,pulmonary edema,esophageal reflux,Luetscher's syndrome,panic disorder,seborrhea  PROCEDURE: The procedure was performed at the bedside  This was a routine study  The transthoracic approach was used  The study included complete 2D imaging, M-mode, complete spectral Doppler, and color Doppler  The heart rate was 95 bpm,  at the start of the study  Images were obtained from the parasternal, apical, subcostal, and suprasternal notch acoustic windows  Image quality was adequate  LEFT VENTRICLE: The ventricle was mildly dilated  Systolic function was severely reduced  Ejection fraction was estimated in the range of 20 % to 30 %  There was severe diffuse hypokinesis  Wall thickness was mildly increased  There was  mild concentric hypertrophy  DOPPLER: Doppler parameters were consistent with a reversible restrictive pattern, indicative of decreased left ventricular diastolic compliance and/or increased left atrial pressure (grade 3 diastolic  dysfunction)  VENTRICULAR SEPTUM: There was moderate paradoxical motion  These changes are consistent with LBBB  RIGHT VENTRICLE: The ventricle was mildly dilated  Systolic function was mildly reduced  LEFT ATRIUM: The atrium was moderately dilated  RIGHT ATRIUM: The atrium was mildly dilated      MITRAL VALVE: There was normal leaflet separation  DOPPLER: Transmitral velocity was minimally increased  There was no evidence for stenosis  There was mild regurgitation  AORTIC VALVE: The valve was trileaflet  Leaflets exhibited mildly increased thickness and normal cuspal separation  DOPPLER: Transaortic velocity was within the normal range  There was no evidence for stenosis  There was no regurgitation  TRICUSPID VALVE: DOPPLER: There was mild to moderate regurgitation  Estimated peak PA pressure was 45 to 50 mmHg  PULMONIC VALVE: DOPPLER: There was no significant regurgitation  PERICARDIUM: There was no thickening or calcification  There was no pericardial effusion  AORTA: The root exhibited normal size  SYSTEMIC VEINS: IVC: The inferior vena cava was mildly dilated  The respirophasic change in diameter was more than 50%  SYSTEM MEASUREMENT TABLES    2D mode  AoR Diam 2D: 3 8 cm  LA Diam (2D): 4 2 cm  LA/Ao (2D): 1 11  FS (2D Teich): 13 6 %  IVSd (2D): 1 26 cm  LVDEV: 152 cmï¾³  LVEDV MOD BP: 261 cmï¾³  LVESV: 108 cmï¾³  LVIDd(2D): 5 57 cm  LVISd (2D): 4 81 cm  LVOT Area 2D: 3 14 cmï¾²  LVPWd (2D): 1 18 cm  SV (Teich): 44 cmï¾³    Apical four chamber  LVEF A4C: 30 %    Apical two chamber  LVEF A2C: 26 %    Unspecified Scan Mode  MATTHEW Cont Eq (Peak Pierre): 2 56 cmï¾²  LVOT (VTI): 12 4 cm  LVOT Diam : 2 cm  LVOT Vmax: 985 mm/s  LVOT Vmax; Mean: 1010 mm/s  Peak Grad ; Mean: 4 mm[Hg]  SV (LVOT): 39 cmï¾³  VTI;Mean: 2 mm[Hg]  MATTHEW Cont Eq (VTI): 1 44 cmï¾²  MV Peak E Pierre   Mean: 1290 mm/s  MVA (PHT): 5 79 cmï¾²  PHT: 29 ms  Max P mm[Hg]  V Max: 3060 mm/s  Vmax: 2660 mm/s  RA Area: 27 9 cmï¾²  RA Volume: 105 7 cmï¾³  TAPSE: 1 5 cm    [employer] Accredited [facility]    Prepared and electronically signed by    Layne Hashimoto, MD  Signed 02-May-2020 13:42:41         Lab Review   Lab Results   Component Value Date    WBC 8 9 2022    HGB 14 8 2022    HCT 43 1 04/28/2022    MCV 89 04/28/2022    RDW 12 7 04/28/2022     04/28/2022     BMP:  Lab Results   Component Value Date    SODIUM 143 04/28/2022    K 4 9 04/28/2022     04/28/2022    CO2 24 04/28/2022    BUN 19 04/28/2022    CREATININE 1 16 04/28/2022    GLUC 109 (H) 04/28/2022    GLUF 105 (H) 05/28/2020    CALCIUM 8 5 03/25/2021    CORRECTEDCA 9 6 03/25/2021    EGFR 75 04/28/2022    MG 2 1 07/26/2021     LFT:  Lab Results   Component Value Date    AST 22 04/28/2022    ALT 25 04/28/2022    ALKPHOS 54 03/25/2021    TP 6 4 04/28/2022    ALB 4 5 04/28/2022      Lab Results   Component Value Date    XPC8CDAEVMTF 8 798 (H) 05/01/2020     No results found for: HGBA1C  Lipid Profile:   Lab Results   Component Value Date    CHOLESTEROL 158 04/28/2022    HDL 39 (L) 04/28/2022    LDLCALC 71 04/28/2022    TRIG 301 (H) 04/28/2022     Lab Results   Component Value Date    CHOLESTEROL 158 04/28/2022    CHOLESTEROL 194 07/26/2021     Lab Results   Component Value Date    TROPONINI <0 02 03/25/2021     Lab Results   Component Value Date    NTBNP 5,288 (H) 05/01/2020              Dr Mica Ojeda MD [facility]      "This note has been constructed using a voice recognition system  Therefore there may be syntax, spelling, and/or grammatical errors   Please call if you have any questions  "

## 2023-01-04 DIAGNOSIS — I50.42 CHRONIC COMBINED SYSTOLIC AND DIASTOLIC CONGESTIVE HEART FAILURE, NYHA CLASS 2 (HCC): ICD-10-CM

## 2023-01-04 RX ORDER — LISINOPRIL 20 MG/1
20 TABLET ORAL DAILY
Qty: 90 TABLET | Refills: 3 | Status: SHIPPED | OUTPATIENT
Start: 2023-01-04

## 2023-01-06 DIAGNOSIS — I50.43 ACUTE ON CHRONIC COMBINED SYSTOLIC AND DIASTOLIC CHF (CONGESTIVE HEART FAILURE) (HCC): ICD-10-CM

## 2023-01-06 RX ORDER — FUROSEMIDE 20 MG/1
20 TABLET ORAL DAILY
Qty: 30 TABLET | Refills: 5 | Status: SHIPPED | OUTPATIENT
Start: 2023-01-06

## 2023-07-11 ENCOUNTER — OFFICE VISIT (OUTPATIENT)
Dept: CARDIOLOGY CLINIC | Facility: CLINIC | Age: 53
End: 2023-07-11
Payer: COMMERCIAL

## 2023-07-11 VITALS
HEIGHT: 70 IN | HEART RATE: 64 BPM | OXYGEN SATURATION: 98 % | DIASTOLIC BLOOD PRESSURE: 68 MMHG | SYSTOLIC BLOOD PRESSURE: 114 MMHG | BODY MASS INDEX: 35.5 KG/M2 | WEIGHT: 248 LBS

## 2023-07-11 DIAGNOSIS — I42.0 DILATED CARDIOMYOPATHY (HCC): ICD-10-CM

## 2023-07-11 DIAGNOSIS — G47.33 OSA (OBSTRUCTIVE SLEEP APNEA): ICD-10-CM

## 2023-07-11 DIAGNOSIS — I10 BENIGN ESSENTIAL HYPERTENSION: ICD-10-CM

## 2023-07-11 DIAGNOSIS — R06.09 DOE (DYSPNEA ON EXERTION): ICD-10-CM

## 2023-07-11 DIAGNOSIS — I50.42 CHRONIC COMBINED SYSTOLIC AND DIASTOLIC CONGESTIVE HEART FAILURE, NYHA CLASS 2 (HCC): ICD-10-CM

## 2023-07-11 DIAGNOSIS — J43.2 CENTRILOBULAR EMPHYSEMA (HCC): ICD-10-CM

## 2023-07-11 DIAGNOSIS — I27.21 SECONDARY PULMONARY ARTERIAL HYPERTENSION (HCC): ICD-10-CM

## 2023-07-11 DIAGNOSIS — I25.10 CORONARY ARTERY DISEASE INVOLVING NATIVE CORONARY ARTERY OF NATIVE HEART WITHOUT ANGINA PECTORIS: ICD-10-CM

## 2023-07-11 DIAGNOSIS — E78.5 DYSLIPIDEMIA: ICD-10-CM

## 2023-07-11 PROCEDURE — 99214 OFFICE O/P EST MOD 30 MIN: CPT | Performed by: INTERNAL MEDICINE

## 2023-07-11 PROCEDURE — 93000 ELECTROCARDIOGRAM COMPLETE: CPT | Performed by: INTERNAL MEDICINE

## 2023-07-11 NOTE — PROGRESS NOTES
Progress Note - Cardiology Office  [facility]    Loni Rodriguez 46 y.o. male MRN: [id number]  : 1970  Encounter: [id number]      Assessment:     1. Chronic combined systolic and diastolic congestive heart failure, NYHA class 2 ([address])    2. ZAYAS (dyspnea on exertion)    3. Coronary artery disease involving native coronary artery of native heart without angina pectoris    4. Dilated cardiomyopathy ([address])    5. Benign essential hypertension    6. Dyslipidemia    7. Centrilobular emphysema ([address])    8. PARKER (obstructive sleep apnea)    9. Secondary pulmonary arterial hypertension ([address])        Discussion Summary and Plan:  1. Combined systolic and diastolic heart failure with dilated cardiomyopathy. Patient EF was found to be 25- 30%. Last echo in  shows his EF is improved. Will continue Coreg Aldactone lisinopril. There is no evidence of volume overload at this time. He has blood work done earlier today which will be reviewed    2. Nonischemic cardiomyopathy. Most likely etiology longstanding uncontrolled hypertension in the setting of LBBB and alcohol abuse. Cardiac catheterization now evidence of obstructive CAD. Renew medical Rx.     3. CKD with history of kidney injury. She has repeat blood test done which will be reviewed.     4. Dyspnea on exertion. He had chronic exertional shortness of breath which is more pronounced he used to be heavy smoker he has quit smoking now. Patient has lost about 7 pounds he is feeling better. 5. History of obstructive sleep apnea. Courage him to use his CPAP machine.     6. Dyslipidemia on statin. He does not take his statin all the time. Encouraged him to be compliant with his Lipitor prescriptions renewed.     7. Previous history of alcohol abuse and tobacco abuse. He still drinks. Advised him to stop drinking otherwise high risk for development and worsening of cardiomyopathy. This was discussed with him again.   Discussed with patient  Again. Says he has cut back on his drinking. 8. Obesity with BMI 85. He has lost weight. 9.  Hypertension. Blood pressure has been acceptable. His medication reviewed. .  Currently he is taking Coreg 25 twice a day, lisinopril 20 mg twice a day. Lasix 20 mg daily spironolactone 12.5 every other day. Will make spironolactone  12.5 mg daily. Will review extralight when done. Continue current Rx follow-up 4 to 6 months. Please call [phone] if any questions. Counseling :  A description of the counseling. Goals and Barriers. Patient's ability to self care: Yes  Medication side effect reviewed with patient in detail and all their questions answered to their satisfaction. HPI :     Joann Jeronimo is a 46y.o. year old male who came for follow up. Patient was recently admitted to [facility] with shortness of breath. He was diagnosed to have pulmonary edema later non workup shows that he had cardiomyopathy with EF around 20-30% with paradoxical septal motion secondary to his LBBB. During workup of also found to have hypertension, LBBB, history of emphysema/COPD. He used to smoke heavily quit 6 years ago. He used to drink also now he drinks occasionally. He underwent cardiac catheterization found to have mild nonobstructive disease and was started on medical therapy. He has a defibrillator vest.  No ICD shocks. Since hospitalization he has lost about 30 lb. He is breathing much better there is no leg edema no nausea no vomiting no other issues. He feels his doing well much better than before. 04/07/2022. Above reviewed. Patient came for follow-up. Patient had history of cardiomyopathy and EF used to be around 35% which improved to 55%. He had history of COPD, emphysema, sleep apnea, obesity with BMI around 38, history of alcohol abuse who came for follow-up. His last blood test was in July of 2021 is triglycerides were still elevated. They were in 500 range. He had stopped taking his medications he has no blood test since August 2021 at that time his electrolytes were acceptable. He does have history of sleep apnea but he could not use CPAP machine. He is taking Aldactone every other day, Lasix 20 mg daily along with Zestril and carvedilol. Unfortunately since last visit he has gained some weight as he lost his job and he has been at home not exercising. He is motivated to lose weight. His EKG shows heart rate 77 beats per minute. He used to have LBBB currently his QRS is narrow. He used to weigh around 225 when he was doing best now he is 267. He understands it and is working on it.      11/22/2022. Above reviewed. Patient came for follow-up. History of cardiomyopathy with EF used to be 35%, history of COPD emphysema, sleep apnea obesity with BMI now around 37, history of alcohol abuse who came for follow-up. He says he is feeling well. He had blood test done in April 2022 triglycerides still elevated. He used to drink heavily he has cut back but though not completely size stop. He is using his CPAP machine. At his request the medication which he is not using there were removed from his list.  He is not using any inhalers. He is otherwise feeling well. He said he got upset with our office today as he was told that he is not on the appointment. His weight is 255 lb which is less. He is trying to cut back his drinking advised him to eat low potassium 4. Repeat BMP will be ordered. EKG reviewed with him    7/11/2023. Above reviewed. Patient came for follow-up. He had history of cardiomyopathy where his EF used to be 35%, history of COPD, emphysema, sleep apnea, obesity with a BMI now around 37, history of alcohol abuse who came for follow-up. With the treatment his EF is improved to 55% last echo was in April 2022. He still drinks though he is not drinking as heavily. He says he is not using any inhalers.   His last blood test was in April 2022 in [facility]. His medications reviewed. He has not done his blood test but he says he has been compliant with his medications. Unfortunately he is not able to use his CPAP machine he promises to be more compliant with it. No leg swelling. Exertional shortness of breath not changed vitals has been stable EKG shows sinus then with heart rate 64 bpm.  Patient has lost about 7 pounds. He is to 48 pounds now today. Review of Systems   Constitutional: Negative for activity change, chills, diaphoresis, fever and unexpected weight change. HENT: Negative for congestion. Some postnasal drip. Eyes: Negative for discharge and redness. Respiratory: Positive for shortness of breath. Negative for cough, chest tightness and wheezing. Chronic exertional not changed   Cardiovascular: Negative. Negative for chest pain, palpitations and leg swelling. Gastrointestinal: Negative for abdominal pain, diarrhea and nausea. Endocrine: Negative. Genitourinary: Negative for decreased urine volume and urgency. Musculoskeletal: Negative. Negative for arthralgias, back pain and gait problem. Skin: Negative for rash and wound. Allergic/Immunologic: Negative. Neurological: Negative for dizziness, seizures, syncope, weakness, light-headedness and headaches. Hematological: Negative. Psychiatric/Behavioral: Positive for sleep disturbance. Negative for agitation and confusion. The patient is not nervous/anxious.          Encouraged him to use CPAP       Historical Information   Past Medical History:   Diagnosis Date   • Acute hypoxemic respiratory failure (HCC)    • Anxiety    • Cardiomyopathy ([address])    • Centrilobular emphysema ([address]) 5/20/2020   • CHF (congestive heart failure) (HCC)     55% no pacer needed   • Combined systolic and diastolic cardiac dysfunction    • Coronary artery disease    • Depression    • Hypertension    • Mixed sleep apnea    • Obesity    • Secondary pulmonary arterial hypertension (HCC)    • Substance abuse ([address])     marijuana cookie some time for sleeping.     • Volume overload      Past Surgical History:   Procedure Laterality Date   • CHOLECYSTECTOMY     • FRACTURE SURGERY      jaw   • KNEE SURGERY     • MANDIBLE SURGERY     • TONSILLECTOMY       Social History     Substance and Sexual Activity   Alcohol Use Yes   • Alcohol/week: 2.0 standard drinks of alcohol   • Types: 2 Cans of beer per week    Comment: Last drink      Social History     Substance and Sexual Activity   Drug Use Yes   • Types: Marijuana    Comment: pot cookie once in a while for sleeping      Social History     Tobacco Use   Smoking Status Former   • Packs/day: 1.00   • Years: 25.00   • Total pack years: 25.00   • Types: Cigarettes   • Quit date: 2014   • Years since quittin.2   Smokeless Tobacco Never     Family History:   Family History   Problem Relation Age of Onset   • Heart disease Father    • Cancer Father    • Cancer Mother    • Colon cancer Mother    • No Known Problems Sister        Meds/Allergies     Allergies   Allergen Reactions   • Cheese - Food Allergy      Fever, nasal congestion   • Chocolate - Food Allergy      Fever, nasal congestion   • Nuts - Food Allergy      Fever, nasal congestion       Current Outpatient Medications:   •  aspirin 81 mg chewable tablet, Chew 1 tablet (81 mg total) daily, Disp: 30 tablet, Rfl: 0  •  atorvastatin (LIPITOR) 40 mg tablet, Take 1 tablet (40 mg total) by mouth daily with dinner, Disp: 90 tablet, Rfl: 3  •  carvedilol (COREG) 25 mg tablet, Take 1 tablet (25 mg total) by mouth 2 (two) times a day with meals, Disp: 180 tablet, Rfl: 3  •  furosemide (LASIX) 20 mg tablet, Take 1 tablet (20 mg total) by mouth daily, Disp: 30 tablet, Rfl: 5  •  lisinopril (ZESTRIL) 20 mg tablet, Take 1 tablet (20 mg total) by mouth daily, Disp: 90 tablet, Rfl: 3  •  Omeprazole-Sodium Bicarbonate (ZEGERID OTC PO), Take by mouth daily, Disp: , Rfl:   • spironolactone (ALDACTONE) 25 mg tablet, Take 0.5 tablets (12.5 mg total) by mouth daily, Disp: 45 tablet, Rfl: 3  •  famotidine (PEPCID) 20 mg tablet, Take 20 mg by mouth 2 (two) times a day (Patient not taking: Reported on 7/27/2021), Disp: , Rfl:   •  levothyroxine 50 mcg tablet, Take 0.5 tablets (25 mcg total) by mouth daily in the early morning (Patient not taking: Reported on 11/22/2022), Disp: 90 tablet, Rfl: 3    Vitals: Blood pressure 114/68, pulse 64, height 5' 10" (1.778 m), weight 112 kg (248 lb), SpO2 98 %. ?  Body mass index is 35.58 kg/m². Vitals:    07/11/23 1242   Weight: 112 kg (248 lb)     BP Readings from Last 3 Encounters:   07/11/23 114/68   11/22/22 150/90   06/02/22 144/86       Physical Exam  Constitutional:       General: He is not in acute distress. Appearance: He is well-developed. He is not diaphoretic. Neck:      Thyroid: No thyromegaly. Vascular: No JVD. Trachea: No tracheal deviation. Cardiovascular:      Rate and Rhythm: Normal rate and regular rhythm. Heart sounds: S1 normal and S2 normal. Heart sounds not distant. Murmur heard. Systolic (ejection) murmur is present with a grade of 2/6. No friction rub. No gallop. No S3 or S4 sounds. Pulmonary:      Effort: Pulmonary effort is normal. No respiratory distress. Breath sounds: Normal breath sounds. No wheezing or rales. Chest:      Chest wall: No tenderness. Abdominal:      General: Bowel sounds are normal. There is no distension. Palpations: Abdomen is soft. Tenderness: There is no abdominal tenderness. Musculoskeletal:         General: No deformity. Cervical back: Neck supple. Skin:     General: Skin is warm and dry. Coloration: Skin is not pale. Findings: No rash. Neurological:      Mental Status: He is alert and oriented to person, place, and time.    Psychiatric:         Behavior: Behavior normal.         Judgment: Judgment normal.         Diagnostic Studies Review Cardio:  Cardiac catheterization and echo report reviewed. Echo Doppler done 2022 shows patient has EF is 55% mild valvular disease mild TR with PA pressure 35 mm Hg. EKG:  Patient has history of LBBB    Twelve lead EKG 2021 shows normal sinus rhythm heart rate 79 beats per minute QTC mildly prolonged. Twelve lead EKG 2020 shows normal sinus rhythm heart rate 82 beats per minute no change from previous EKG  milliseconds. Twelve lead EKG done on 2022:  Normal sinus rhythm heart rate 77 beats per minute Q without inferior leads most likely due to lead location poor R-wave progression no change from previous EKG QTC is 482 milliseconds. Twelve lead EKG 2022 shows normal sinus rhythm heart rate 62 beats per minute Q-wave noted in inferior leads. QTC is acceptable. Twelve-lead EKG done on 2023 normal sinus rhythm heart rate 64 bpm no change from previous EKG Q waves noted in lead III. Heriberto Suárez QTc is 447 ms. Cardiac testing:   Results for orders placed during the hospital encounter of 20   Echo complete with contrast if indicated    [address]  [address]  [phone]    Transthoracic Echocardiogram  2D, M-mode, Doppler, and Color Doppler    Study date:  02-May-2020    Patient: Loni Rodriguez  MR number: [id number]  Account number: [de-identified]  : 1970  Age: 45 years  Gender: Male  Status: Inpatient  Location: Bedside  Height: 70 in  Weight: 270.4 lb  BP: 153/ 96 mmHg    Indications: SOB    Diagnoses: I50.9 - Heart failure, unspecified    Sonographer:  PRAMOD Barrios  Referring Physician:  BRISEIDA Flores  Group:  [facility]  Interpreting Physician:  Simon Mortimer, MD    SUMMARY    LEFT VENTRICLE:  The ventricle was mildly dilated. Systolic function was severely reduced.  Ejection fraction was estimated in the range of 20 % to 30 %.  There was severe diffuse hypokinesis. Wall thickness was mildly increased. There was mild concentric hypertrophy. Doppler parameters were consistent with a reversible restrictive pattern, indicative of decreased left ventricular diastolic compliance and/or increased left atrial pressure (grade 3 diastolic dysfunction). VENTRICULAR SEPTUM:  There was moderate paradoxical motion. These changes are consistent with LBBB. RIGHT VENTRICLE:  The ventricle was mildly dilated. Systolic function was mildly reduced. LEFT ATRIUM:  The atrium was moderately dilated. RIGHT ATRIUM:  The atrium was mildly dilated. MITRAL VALVE:  There was mild regurgitation. TRICUSPID VALVE:  There was mild to moderate regurgitation. Estimated peak PA pressure was 45 to 50 mmHg. IVC, HEPATIC VEINS:  The inferior vena cava was mildly dilated. The respirophasic change in diameter was more than 50%. HISTORY: PRIOR HISTORY: HTN,Acute kidney injury,pulmonary edema,esophageal reflux,Luetscher's syndrome,panic disorder,seborrhea. PROCEDURE: The procedure was performed at the bedside. This was a routine study. The transthoracic approach was used. The study included complete 2D imaging, M-mode, complete spectral Doppler, and color Doppler. The heart rate was 95 bpm,  at the start of the study. Images were obtained from the parasternal, apical, subcostal, and suprasternal notch acoustic windows. Image quality was adequate. LEFT VENTRICLE: The ventricle was mildly dilated. Systolic function was severely reduced. Ejection fraction was estimated in the range of 20 % to 30 %. There was severe diffuse hypokinesis. Wall thickness was mildly increased. There was  mild concentric hypertrophy. DOPPLER: Doppler parameters were consistent with a reversible restrictive pattern, indicative of decreased left ventricular diastolic compliance and/or increased left atrial pressure (grade 3 diastolic  dysfunction).     VENTRICULAR SEPTUM: There was moderate paradoxical motion. These changes are consistent with LBBB. RIGHT VENTRICLE: The ventricle was mildly dilated. Systolic function was mildly reduced. LEFT ATRIUM: The atrium was moderately dilated. RIGHT ATRIUM: The atrium was mildly dilated. MITRAL VALVE: There was normal leaflet separation. DOPPLER: Transmitral velocity was minimally increased. There was no evidence for stenosis. There was mild regurgitation. AORTIC VALVE: The valve was trileaflet. Leaflets exhibited mildly increased thickness and normal cuspal separation. DOPPLER: Transaortic velocity was within the normal range. There was no evidence for stenosis. There was no regurgitation. TRICUSPID VALVE: DOPPLER: There was mild to moderate regurgitation. Estimated peak PA pressure was 45 to 50 mmHg. PULMONIC VALVE: DOPPLER: There was no significant regurgitation. PERICARDIUM: There was no thickening or calcification. There was no pericardial effusion. AORTA: The root exhibited normal size. SYSTEMIC VEINS: IVC: The inferior vena cava was mildly dilated. The respirophasic change in diameter was more than 50%. SYSTEM MEASUREMENT TABLES    2D mode  AoR Diam 2D: 3.8 cm  LA Diam (2D): 4.2 cm  LA/Ao (2D): 1.11  FS (2D Teich): 13.6 %  IVSd (2D): 1.26 cm  LVDEV: 152 cmï¾³  LVEDV MOD BP: 261 cmï¾³  LVESV: 108 cmï¾³  LVIDd(2D): 5.57 cm  LVISd (2D): 4.81 cm  LVOT Area 2D: 3.14 cmï¾²  LVPWd (2D): 1.18 cm  SV (Teich): 44 cmï¾³    Apical four chamber  LVEF A4C: 30 %    Apical two chamber  LVEF A2C: 26 %    Unspecified Scan Mode  MATTHEW Cont Eq (Peak Pierre): 2.56 cmï¾²  LVOT (VTI): 12.4 cm  LVOT Diam.: 2 cm  LVOT Vmax: 985 mm/s  LVOT Vmax; Mean: 1010 mm/s  Peak Grad.; Mean: 4 mm[Hg]  SV (LVOT): 39 cmï¾³  VTI;Mean: 2 mm[Hg]  MATTHEW Cont Eq (VTI): 1.44 cmï¾²  MV Peak E Pierre.  Mean: 1290 mm/s  MVA (PHT): 5.79 cmï¾²  PHT: 29 ms  Max P mm[Hg]  V Max: 3060 mm/s  Vmax: 2660 mm/s  RA Area: 27.9 cmï¾²  RA Volume: 105.7 cmï¾³  TAPSE: 1.5 cm    [facility] Accredited Echocardiography Laboratory    Prepared and electronically signed by    Marvin Barreto MD  Signed 02-May-2020 13:42:41         Lab Review   Lab Results   Component Value Date    WBC 8.9 04/28/2022    HGB 14.8 04/28/2022    HCT 43.1 04/28/2022    MCV 89 04/28/2022    RDW 12.7 04/28/2022     04/28/2022     BMP:  Lab Results   Component Value Date    SODIUM 143 04/28/2022    K 4.9 04/28/2022     04/28/2022    CO2 24 04/28/2022    BUN 19 04/28/2022    CREATININE 1.16 04/28/2022    GLUC 109 (H) 04/28/2022    GLUF 105 (H) 05/28/2020    CALCIUM 8.5 03/25/2021    CORRECTEDCA 9.6 03/25/2021    EGFR 75 04/28/2022    MG 2.1 07/26/2021     LFT:  Lab Results   Component Value Date    AST 22 04/28/2022    ALT 25 04/28/2022    ALKPHOS 54 03/25/2021    TP 6.4 04/28/2022    ALB 4.5 04/28/2022      Lab Results   Component Value Date    LSN9UHPTILWL 8.798 (H) 05/01/2020     No results found for: "HGBA1C"  Lipid Profile:   Lab Results   Component Value Date    CHOLESTEROL 158 04/28/2022    HDL 39 (L) 04/28/2022    LDLCALC 71 04/28/2022    TRIG 301 (H) 04/28/2022     Lab Results   Component Value Date    CHOLESTEROL 158 04/28/2022    CHOLESTEROL 194 07/26/2021     Lab Results   Component Value Date    TROPONINI <0.02 03/25/2021     Lab Results   Component Value Date    NTBNP 5,288 (H) 05/01/2020              Dr. Marvin Barreto MD [facility]      "This note has been constructed using a voice recognition system. Therefore there may be syntax, spelling, and/or grammatical errors.  Please call if you have any questions. "

## 2023-07-12 ENCOUNTER — TELEPHONE (OUTPATIENT)
Dept: CARDIOLOGY CLINIC | Facility: CLINIC | Age: 53
End: 2023-07-12

## 2023-07-12 LAB
ALBUMIN SERPL-MCNC: 5.1 G/DL (ref 3.8–4.9)
ALBUMIN/GLOB SERPL: 2.4 {RATIO} (ref 1.2–2.2)
ALP SERPL-CCNC: 72 IU/L (ref 44–121)
ALT SERPL-CCNC: 24 IU/L (ref 0–44)
AST SERPL-CCNC: 25 IU/L (ref 0–40)
BILIRUB SERPL-MCNC: 0.8 MG/DL (ref 0–1.2)
BUN SERPL-MCNC: 20 MG/DL (ref 6–24)
BUN/CREAT SERPL: 16 (ref 9–20)
CALCIUM SERPL-MCNC: 10.1 MG/DL (ref 8.7–10.2)
CHLORIDE SERPL-SCNC: 105 MMOL/L (ref 96–106)
CO2 SERPL-SCNC: 24 MMOL/L (ref 20–29)
CREAT SERPL-MCNC: 1.25 MG/DL (ref 0.76–1.27)
EGFR: 68 ML/MIN/1.73
GLOBULIN SER-MCNC: 2.1 G/DL (ref 1.5–4.5)
GLUCOSE SERPL-MCNC: 106 MG/DL (ref 70–99)
POTASSIUM SERPL-SCNC: 5 MMOL/L (ref 3.5–5.2)
PROT SERPL-MCNC: 7.2 G/DL (ref 6–8.5)
SODIUM SERPL-SCNC: 145 MMOL/L (ref 134–144)

## 2023-07-12 NOTE — TELEPHONE ENCOUNTER
----- Message from Kvng Rosario MD sent at 7/12/2023 12:07 PM EDT -----  Patient blood test reviewed. They are acceptable. Will continue same medication. Patient should keep his appointment for follow-up.

## 2023-09-25 DIAGNOSIS — I50.43 ACUTE ON CHRONIC COMBINED SYSTOLIC AND DIASTOLIC CHF (CONGESTIVE HEART FAILURE) (HCC): ICD-10-CM

## 2023-09-25 RX ORDER — FUROSEMIDE 20 MG/1
20 TABLET ORAL DAILY
Qty: 30 TABLET | Refills: 5 | Status: SHIPPED | OUTPATIENT
Start: 2023-09-25

## 2023-10-18 DIAGNOSIS — I10 UNCONTROLLED HYPERTENSION: ICD-10-CM

## 2023-10-18 RX ORDER — CARVEDILOL 25 MG/1
25 TABLET ORAL 2 TIMES DAILY WITH MEALS
Qty: 180 TABLET | Refills: 3 | Status: SHIPPED | OUTPATIENT
Start: 2023-10-18

## 2023-12-15 DIAGNOSIS — E78.5 DYSLIPIDEMIA: ICD-10-CM

## 2023-12-18 RX ORDER — ATORVASTATIN CALCIUM 40 MG/1
40 TABLET, FILM COATED ORAL
Qty: 90 TABLET | Refills: 3 | Status: SHIPPED | OUTPATIENT
Start: 2023-12-18

## 2024-02-21 PROBLEM — A41.9 SEVERE SEPSIS (HCC): Status: RESOLVED | Noted: 2020-05-16 | Resolved: 2024-02-21

## 2024-02-21 PROBLEM — R65.20 SEVERE SEPSIS (HCC): Status: RESOLVED | Noted: 2020-05-16 | Resolved: 2024-02-21

## 2024-02-23 DIAGNOSIS — I50.42 CHRONIC COMBINED SYSTOLIC AND DIASTOLIC HEART FAILURE, NYHA CLASS 2 (HCC): ICD-10-CM

## 2024-02-23 RX ORDER — SPIRONOLACTONE 25 MG/1
12.5 TABLET ORAL DAILY
Qty: 45 TABLET | Refills: 0 | Status: SHIPPED | OUTPATIENT
Start: 2024-02-23

## 2024-03-05 ENCOUNTER — OFFICE VISIT (OUTPATIENT)
Dept: CARDIOLOGY CLINIC | Facility: CLINIC | Age: 54
End: 2024-03-05
Payer: COMMERCIAL

## 2024-03-05 VITALS
DIASTOLIC BLOOD PRESSURE: 82 MMHG | BODY MASS INDEX: 34.93 KG/M2 | OXYGEN SATURATION: 95 % | SYSTOLIC BLOOD PRESSURE: 120 MMHG | HEART RATE: 69 BPM | HEIGHT: 70 IN | WEIGHT: 244 LBS

## 2024-03-05 DIAGNOSIS — E78.5 DYSLIPIDEMIA: ICD-10-CM

## 2024-03-05 DIAGNOSIS — G47.33 OSA (OBSTRUCTIVE SLEEP APNEA): ICD-10-CM

## 2024-03-05 DIAGNOSIS — I50.42 CHRONIC COMBINED SYSTOLIC AND DIASTOLIC HEART FAILURE, NYHA CLASS 2 (HCC): ICD-10-CM

## 2024-03-05 DIAGNOSIS — I50.42 CHRONIC COMBINED SYSTOLIC AND DIASTOLIC CONGESTIVE HEART FAILURE, NYHA CLASS 2 (HCC): ICD-10-CM

## 2024-03-05 DIAGNOSIS — R06.09 DOE (DYSPNEA ON EXERTION): ICD-10-CM

## 2024-03-05 DIAGNOSIS — I10 UNCONTROLLED HYPERTENSION: ICD-10-CM

## 2024-03-05 DIAGNOSIS — I42.0 DILATED CARDIOMYOPATHY (HCC): ICD-10-CM

## 2024-03-05 DIAGNOSIS — J43.2 CENTRILOBULAR EMPHYSEMA (HCC): ICD-10-CM

## 2024-03-05 DIAGNOSIS — I25.10 CORONARY ARTERY DISEASE INVOLVING NATIVE CORONARY ARTERY OF NATIVE HEART WITHOUT ANGINA PECTORIS: ICD-10-CM

## 2024-03-05 DIAGNOSIS — Z78.9 ALCOHOL USE: ICD-10-CM

## 2024-03-05 PROCEDURE — 99214 OFFICE O/P EST MOD 30 MIN: CPT | Performed by: INTERNAL MEDICINE

## 2024-03-05 PROCEDURE — 93000 ELECTROCARDIOGRAM COMPLETE: CPT | Performed by: INTERNAL MEDICINE

## 2024-03-05 RX ORDER — LISINOPRIL 10 MG/1
10 TABLET ORAL DAILY
Qty: 90 TABLET | Refills: 2 | Status: SHIPPED | OUTPATIENT
Start: 2024-03-05

## 2024-03-05 NOTE — PROGRESS NOTES
Progress Note - Cardiology Office  [facility]    Tracie Garnica 53 y.o. male MRN: [id number]  : 1970  Encounter: [id number]      Assessment:     1. ZAYAS (dyspnea on exertion)    2. Chronic combined systolic and diastolic heart failure, NYHA class 2 (HCC)    3. Dilated cardiomyopathy (HCC)    4. Coronary artery disease involving native coronary artery of native heart without angina pectoris    5. Uncontrolled hypertension    6. Dyslipidemia    7. Centrilobular emphysema (HCC)    8. PARKER (obstructive sleep apnea)    9. Alcohol use    10. Chronic combined systolic and diastolic congestive heart failure, NYHA class 2 (HCC)        Discussion Summary and Plan:  1. Combined systolic and diastolic heart failure with dilated cardiomyopathy.  Patient EF was found to be 25- 30%.  Last echo in  shows his EF is improved.  Will continue Coreg Aldactone lisinopril.  There is no evidence of volume overload at this time.  His last blood test electro was 2023 which has been acceptable he take lisinopril 10 mg daily, Coreg 25 twice a day Aldactone 12.5 mg daily.  BMP and a CMP other labs ordered    2. Nonischemic cardiomyopathy.  Most likely etiology longstanding uncontrolled hypertension in the setting of LBBB and alcohol abuse.  Cardiac catheterization in  shows no obstructive CAD.  His QRS duration has normalized.  His EF is improved last echo was  will get updated echo Doppler.     3. CKD with history of kidney injury.  Blood test ordered.     4. Dyspnea on exertion.  He has chronic exertional dyspnea which has not changed but actually much better he has quit smoking.  He has lost weight also his BMI now around 35.    5. History of obstructive sleep apnea.  Patient is now using his CPAP machine regularly.     6. Dyslipidemia on statin.   He does not take his statin all the time.  Encouraged him to be compliant with his Lipitor prescriptions renewed.  Has not done any blood test blood  test ordered and they need to be fasting     7. Previous history of alcohol abuse and tobacco abuse.  He still drinks.  Advised him to stop drinking otherwise high risk for development and worsening of cardiomyopathy.  This was discussed with him again.  Says he has cut back on drinking.    8. Obesity with BMI 35.  He has lost weight.    9.  Hypertension.  Currently he is taking lisinopril 10 mg daily, Coreg 25 twice a day Lasix 20 mg daily and spironolactone 12.5 mg daily.  No leg edema and blood pressure has been acceptable electrolyte ordered.      Discussed with patient to be compliant with medication and follow-up.  Echo Doppler ordered.  He will follow-up in 6 months.      Patient was advised and educated to call our office  immediately or go to the nearest hospital if  patient has any new symptoms of chest pain/shortness of breath, near-syncope, syncope, light headedness sustained palpitations  or any other cardiovascular symptoms before their scheduled follow-up appointment.  Office phone number was provided #[phone].     Please call [phone] if any questions.  Counseling :  A description of the counseling.  Goals and Barriers.  Patient's ability to self care: Yes  Medication side effect reviewed with patient in detail and all their questions answered to their satisfaction.    HPI :     Tracie Garnica is a 53 y.o. year old male who came for follow up. Patient was recently admitted to [facility] with shortness of breath.  He was diagnosed to have pulmonary edema later non workup shows that he had cardiomyopathy with EF around 20-30% with paradoxical septal motion secondary to his LBBB.  During workup of also found to have hypertension, LBBB, history of emphysema/COPD.  He used to smoke heavily quit 6 years ago.  He used to drink also now he drinks occasionally.  He underwent cardiac catheterization found to have mild nonobstructive disease and was started on medical therapy.  He has a  defibrillator vest.  No ICD shocks.  Since hospitalization he has lost about 30 lb.  He is breathing much better there is no leg edema no nausea no vomiting no other issues.  He feels his doing well much better than before.      04/07/2022.    Above reviewed.  Patient came for follow-up.  Patient had history of cardiomyopathy and EF used to be around 35% which improved to 55%.  He had history of COPD, emphysema, sleep apnea, obesity with BMI around 38, history of alcohol abuse who came for follow-up.  His last blood test was in July of 2021 is triglycerides were still elevated.  They were in 500 range.  He had stopped taking his medications he has no blood test since August 2021 at that time his electrolytes were acceptable.  He does have history of sleep apnea but he could not use CPAP machine.  He is taking Aldactone every other day, Lasix 20 mg daily along with Zestril and carvedilol.  Unfortunately since last visit he has gained some weight as he lost his job and he has been at home not exercising.  He is motivated to lose weight.  His EKG shows heart rate 77 beats per minute.  He used to have LBBB currently his QRS is narrow.  He used to weigh around 225 when he was doing best now he is 267.  He understands it and is working on it.      11/22/2022.      Above reviewed.  Patient came for follow-up.  History of cardiomyopathy with EF used to be 35%, history of COPD emphysema, sleep apnea obesity with BMI now around 37, history of alcohol abuse who came for follow-up.  He says he is feeling well.  He had blood test done in April 2022 triglycerides still elevated.  He used to drink heavily he has cut back but though not completely size stop.  He is using his CPAP machine.  At his request the medication which he is not using there were removed from his list.  He is not using any inhalers.  He is otherwise feeling well.  He said he got upset with our office today as he was told that he is not on the appointment.  His  weight is 255 lb which is less.  He is trying to cut back his drinking advised him to eat low potassium 4.  Repeat BMP will be ordered.  EKG reviewed with him    7/11/2023.    Above reviewed.  Patient came for follow-up.  He had history of cardiomyopathy where his EF used to be 35%, history of COPD, emphysema, sleep apnea, obesity with a BMI now around 37, history of alcohol abuse who came for follow-up.  With the treatment his EF is improved to 55% last echo was in April 2022.  He still drinks though he is not drinking as heavily.  He says he is not using any inhalers.  His last blood test was in April 2022 in [facility] system.  His medications reviewed.  He has not done his blood test but he says he has been compliant with his medications.  Unfortunately he is not able to use his CPAP machine he promises to be more compliant with it.  No leg swelling.  Exertional shortness of breath not changed vitals has been stable EKG shows sinus then with heart rate 64 bpm.  Patient has lost about 7 pounds.  He is to 48 pounds now today.    3/5/2024.    Labs reviewed.  Patient came for follow-up.  He had history of COPD emphysema, sleep apnea, obesity with a BMI now around 35 as he lost weight, history of alcohol abuse, history of dilated cardiomyopathy whose EF used to be 35% now improved to around 55% on the last echo.  Last echo was in April 2022.  His medications were reviewed.  He is not able to use his CPAP machine and is not compliant with it.  His meds reviewed.  He says he still take Aldactone 12.5 mill daily, lisinopril 10 mg, Lasix 20 mg daily, Coreg 25 twice a day atorvastatin 40 mg daily and aspirin.  Did have mild nonobstructive disease by cath.  Cardiac catheterization was done in May 2020.  His last blood test is July 2023 which shows his electrolytes were stable but triglycerides still elevated and did not have any recent cholesterol test since April 2022.  Still continues to have chronic exertional shortness  of breath.  He has not done his blood test or seen his primary care doctor.  He is willing to have repeat blood test ordered and done.  Otherwise he feels well.        Review of Systems   Constitutional:  Negative for activity change, chills, diaphoresis, fever and unexpected weight change.   HENT:  Negative for congestion.    Eyes:  Negative for discharge and redness.   Respiratory:  Positive for shortness of breath. Negative for cough, chest tightness and wheezing.         Chronic exertional not changed   Cardiovascular: Negative.  Negative for chest pain, palpitations and leg swelling.   Gastrointestinal:  Negative for abdominal pain, diarrhea and nausea.   Endocrine: Negative.    Genitourinary:  Negative for decreased urine volume and urgency.   Musculoskeletal:  Positive for arthralgias. Negative for back pain and gait problem.   Skin:  Negative for rash and wound.   Allergic/Immunologic: Negative.    Neurological:  Negative for dizziness, seizures, syncope, weakness, light-headedness and headaches.   Hematological: Negative.    Psychiatric/Behavioral:  Positive for sleep disturbance. Negative for agitation and confusion. The patient is not nervous/anxious.        Historical Information   Past Medical History:   Diagnosis Date   • Acute hypoxemic respiratory failure (HCC)    • Anxiety    • Cardiomyopathy (HCC)    • Centrilobular emphysema (HCC) 5/20/2020   • CHF (congestive heart failure) (HCC)     55% no pacer needed   • Combined systolic and diastolic cardiac dysfunction    • Coronary artery disease    • Depression    • Hypertension    • Mixed sleep apnea    • Obesity    • Secondary pulmonary arterial hypertension (HCC)    • Substance abuse (HCC)     marijuana cookie some time for sleeping.    • Volume overload      Past Surgical History:   Procedure Laterality Date   • CHOLECYSTECTOMY     • FRACTURE SURGERY      jaw   • KNEE SURGERY     • MANDIBLE SURGERY     • TONSILLECTOMY       Social History      Substance and Sexual Activity   Alcohol Use Yes   • Alcohol/week: 2.0 standard drinks of alcohol   • Types: 2 Cans of beer per week    Comment: Last drink      Social History     Substance and Sexual Activity   Drug Use Yes   • Types: Marijuana    Comment: pot cookie once in a while for sleeping      Social History     Tobacco Use   Smoking Status Former   • Current packs/day: 0.00   • Average packs/day: 1 pack/day for 25.0 years (25.0 ttl pk-yrs)   • Types: Cigarettes   • Start date: 1989   • Quit date: 2014   • Years since quittin.8   Smokeless Tobacco Never     Family History:   Family History   Problem Relation Age of Onset   • Heart disease Father    • Cancer Father    • Cancer Mother    • Colon cancer Mother    • No Known Problems Sister        Meds/Allergies     Allergies   Allergen Reactions   • Cheese - Food Allergy      Fever, nasal congestion   • Chocolate - Food Allergy      Fever, nasal congestion   • Nuts - Food Allergy      Fever, nasal congestion       Current Outpatient Medications:   •  aspirin 81 mg chewable tablet, Chew 1 tablet (81 mg total) daily, Disp: 30 tablet, Rfl: 0  •  atorvastatin (LIPITOR) 40 mg tablet, take 1 tablet by mouth once daily  WITH DINNER, Disp: 90 tablet, Rfl: 3  •  carvedilol (COREG) 25 mg tablet, take 1 tablet by mouth twice a day with meals, Disp: 180 tablet, Rfl: 3  •  furosemide (LASIX) 20 mg tablet, Take 1 tablet (20 mg total) by mouth daily, Disp: 30 tablet, Rfl: 5  •  lisinopril (ZESTRIL) 10 mg tablet, Take 1 tablet (10 mg total) by mouth daily, Disp: 90 tablet, Rfl: 2  •  Omeprazole-Sodium Bicarbonate (ZEGERID OTC PO), Take by mouth daily, Disp: , Rfl:   •  spironolactone (ALDACTONE) 25 mg tablet, take 1/2 tablet by mouth once daily, Disp: 45 tablet, Rfl: 0  •  famotidine (PEPCID) 20 mg tablet, Take 20 mg by mouth 2 (two) times a day (Patient not taking: Reported on 2021), Disp: , Rfl:   •  levothyroxine 50 mcg tablet, Take 0.5  "tablets (25 mcg total) by mouth daily in the early morning (Patient not taking: Reported on 11/22/2022), Disp: 90 tablet, Rfl: 3    Vitals: Blood pressure 120/82, pulse 69, height 5' 10\" (1.778 m), weight 111 kg (244 lb), SpO2 95%.  ?  Body mass index is 35.01 kg/m².  Vitals:    03/05/24 1324   Weight: 111 kg (244 lb)     BP Readings from Last 3 Encounters:   03/05/24 120/82   07/11/23 114/68   11/22/22 150/90       Physical Exam  Constitutional:       General: He is not in acute distress.     Appearance: He is well-developed. He is not diaphoretic.   Neck:      Thyroid: No thyromegaly.      Vascular: No JVD.      Trachea: No tracheal deviation.   Cardiovascular:      Rate and Rhythm: Normal rate and regular rhythm.      Heart sounds: S1 normal and S2 normal. Heart sounds not distant. Murmur heard.      Systolic (ejection) murmur is present with a grade of 2/6.      No friction rub. No gallop. No S3 or S4 sounds.   Pulmonary:      Effort: Pulmonary effort is normal. No respiratory distress.      Breath sounds: Normal breath sounds. No wheezing or rales.   Chest:      Chest wall: No tenderness.   Abdominal:      General: Bowel sounds are normal. There is no distension.      Palpations: Abdomen is soft.      Tenderness: There is no abdominal tenderness.   Musculoskeletal:         General: No deformity.      Cervical back: Neck supple.   Skin:     General: Skin is warm and dry.      Coloration: Skin is not pale.      Findings: No rash.   Neurological:      Mental Status: He is alert and oriented to person, place, and time.   Psychiatric:         Behavior: Behavior normal.         Judgment: Judgment normal.         Diagnostic Studies Review Cardio:  Cardiac catheterization and echo report reviewed.      Echo Doppler done April 2022 shows patient has EF is 55% mild valvular disease mild TR with PA pressure 35 mm Hg.    EKG:  Patient has history of LBBB    Twelve lead EKG 02/11/2021 shows normal sinus rhythm heart rate 79 " beats per minute QTC mildly prolonged.    Twelve lead EKG 2020 shows normal sinus rhythm heart rate 82 beats per minute no change from previous EKG  milliseconds.    Twelve lead EKG done on 2022:  Normal sinus rhythm heart rate 77 beats per minute Q without inferior leads most likely due to lead location poor R-wave progression no change from previous EKG QTC is 482 milliseconds.    Twelve lead EKG 2022 shows normal sinus rhythm heart rate 62 beats per minute Q-wave noted in inferior leads.  QTC is acceptable.    Twelve-lead EKG done on 2023 normal sinus rhythm heart rate 64 bpm no change from previous EKG Q waves noted in lead III..  QTc is 447 ms.    Twelve-lead EKG done on 3/5/2024 will sinus them with a heart rate 69 bpm.  Q inferior leads most like pseudo infarct pattern no change from previous EKG.  QTc is 467 ms.    Cardiac testing:   Results for orders placed during the hospital encounter of 20   Echo complete with contrast if indicated    Narrative [place] [id number] [phone]    Transthoracic Echocardiogram  2D, M-mode, Doppler, and Color Doppler    Study date:  02-May-2020    Patient: ANNI CHAVEZ  MR number: [id number]  Account number: [id number]  : 1970  Age: 50 years  Gender: Male  Status: Inpatient  Location: Bedside  Height: 70 in  Weight: 270.4 lb  BP: 153/ 96 mmHg    Indications: SOB    Diagnoses: I50.9 - Heart failure, unspecified    Sonographer:  PRAMOD English  Referring Physician:  BRISEIDA Ochoa  Group:  [facility]  Interpreting Physician:  Susannah Byrnes MD    SUMMARY    LEFT VENTRICLE:  The ventricle was mildly dilated.  Systolic function was severely reduced. Ejection fraction was estimated in the range of 20 % to 30 %.  There was severe diffuse hypokinesis.  Wall thickness was mildly increased.  There was mild concentric hypertrophy.  Doppler  parameters were consistent with a reversible restrictive pattern, indicative of decreased left ventricular diastolic compliance and/or increased left atrial pressure (grade 3 diastolic dysfunction).    VENTRICULAR SEPTUM:  There was moderate paradoxical motion. These changes are consistent with LBBB.    RIGHT VENTRICLE:  The ventricle was mildly dilated.  Systolic function was mildly reduced.    LEFT ATRIUM:  The atrium was moderately dilated.    RIGHT ATRIUM:  The atrium was mildly dilated.    MITRAL VALVE:  There was mild regurgitation.    TRICUSPID VALVE:  There was mild to moderate regurgitation.  Estimated peak PA pressure was 45 to 50 mmHg.    IVC, HEPATIC VEINS:  The inferior vena cava was mildly dilated.  The respirophasic change in diameter was more than 50%.    HISTORY: PRIOR HISTORY: HTN,Acute kidney injury,pulmonary edema,esophageal reflux,Luetscher's syndrome,panic disorder,seborrhea.    PROCEDURE: The procedure was performed at the bedside. This was a routine study. The transthoracic approach was used. The study included complete 2D imaging, M-mode, complete spectral Doppler, and color Doppler. The heart rate was 95 bpm,  at the start of the study. Images were obtained from the parasternal, apical, subcostal, and suprasternal notch acoustic windows. Image quality was adequate.    LEFT VENTRICLE: The ventricle was mildly dilated. Systolic function was severely reduced. Ejection fraction was estimated in the range of 20 % to 30 %. There was severe diffuse hypokinesis. Wall thickness was mildly increased. There was  mild concentric hypertrophy. DOPPLER: Doppler parameters were consistent with a reversible restrictive pattern, indicative of decreased left ventricular diastolic compliance and/or increased left atrial pressure (grade 3 diastolic  dysfunction).    VENTRICULAR SEPTUM: There was moderate paradoxical motion. These changes are consistent with LBBB.    RIGHT VENTRICLE: The ventricle was mildly  dilated. Systolic function was mildly reduced.    LEFT ATRIUM: The atrium was moderately dilated.    RIGHT ATRIUM: The atrium was mildly dilated.    MITRAL VALVE: There was normal leaflet separation. DOPPLER: Transmitral velocity was minimally increased. There was no evidence for stenosis. There was mild regurgitation.    AORTIC VALVE: The valve was trileaflet. Leaflets exhibited mildly increased thickness and normal cuspal separation. DOPPLER: Transaortic velocity was within the normal range. There was no evidence for stenosis. There was no regurgitation.    TRICUSPID VALVE: DOPPLER: There was mild to moderate regurgitation. Estimated peak PA pressure was 45 to 50 mmHg.    PULMONIC VALVE: DOPPLER: There was no significant regurgitation.    PERICARDIUM: There was no thickening or calcification. There was no pericardial effusion.    AORTA: The root exhibited normal size.    SYSTEMIC VEINS: IVC: The inferior vena cava was mildly dilated. The respirophasic change in diameter was more than 50%.    SYSTEM MEASUREMENT TABLES    2D mode  AoR Diam 2D: 3.8 cm  LA Diam (2D): 4.2 cm  LA/Ao (2D): 1.11  FS (2D Teich): 13.6 %  IVSd (2D): 1.26 cm  LVDEV: 152 cmï¾³  LVEDV MOD BP: 261 cmï¾³  LVESV: 108 cmï¾³  LVIDd(2D): 5.57 cm  LVISd (2D): 4.81 cm  LVOT Area 2D: 3.14 cmï¾²  LVPWd (2D): 1.18 cm  SV (Teich): 44 cmï¾³    Apical four chamber  LVEF A4C: 30 %    Apical two chamber  LVEF A2C: 26 %    Unspecified Scan Mode  MATTHEW Cont Eq (Peak Pierre): 2.56 cmï¾²  LVOT (VTI): 12.4 cm  LVOT Diam.: 2 cm  LVOT Vmax: 985 mm/s  LVOT Vmax; Mean: 1010 mm/s  Peak Grad.; Mean: 4 mm[Hg]  SV (LVOT): 39 cmï¾³  VTI;Mean: 2 mm[Hg]  MATTHEW Cont Eq (VTI): 1.44 cmï¾²  MV Peak E Pierre. Mean: 1290 mm/s  MVA (PHT): 5.79 cmï¾²  PHT: 29 ms  Max P mm[Hg]  V Max: 3060 mm/s  Vmax: 2660 mm/s  RA Area: 27.9 cmï¾²  RA Volume: 105.7 cmï¾³  TAPSE: 1.5 cm    [employer] Accredited Echocardiography Laboratory    Prepared and electronically signed by    Susannah  "MD Fitz  Signed 02-May-2020 13:42:41         Lab Review   Lab Results   Component Value Date    WBC 8.9 04/28/2022    HGB 14.8 04/28/2022    HCT 43.1 04/28/2022    MCV 89 04/28/2022    RDW 12.7 04/28/2022     04/28/2022     BMP:  Lab Results   Component Value Date    SODIUM 145 (H) 07/11/2023    K 5.0 07/11/2023     07/11/2023    CO2 24 07/11/2023    BUN 20 07/11/2023    CREATININE 1.25 07/11/2023    GLUC 106 (H) 07/11/2023    GLUF 105 (H) 05/28/2020    CALCIUM 8.5 03/25/2021    CORRECTEDCA 9.6 03/25/2021    EGFR 68 07/11/2023    MG 2.1 07/26/2021     LFT:  Lab Results   Component Value Date    AST 25 07/11/2023    ALT 24 07/11/2023    ALKPHOS 54 03/25/2021    TP 7.2 07/11/2023    ALB 5.1 (H) 07/11/2023      Lab Results   Component Value Date    EIK4PYEORDVH 8.798 (H) 05/01/2020     No results found for: \"HGBA1C\"  Lipid Profile:   Lab Results   Component Value Date    CHOLESTEROL 158 04/28/2022    HDL 39 (L) 04/28/2022    LDLCALC 71 04/28/2022    TRIG 301 (H) 04/28/2022     Lab Results   Component Value Date    CHOLESTEROL 158 04/28/2022    CHOLESTEROL 194 07/26/2021     Lab Results   Component Value Date    TROPONINI <0.02 03/25/2021     Lab Results   Component Value Date    NTBNP 5,288 (H) 05/01/2020              Dr. Susannah Byrnes MD FACC      \"This note has been constructed using a voice recognition system.Therefore there may be syntax, spelling, and/or grammatical errors. Please call if you have any questions. \"  "

## 2024-03-23 LAB
ALBUMIN SERPL-MCNC: 4.4 G/DL (ref 3.8–4.9)
ALBUMIN/GLOB SERPL: 2 {RATIO} (ref 1.2–2.2)
ALP SERPL-CCNC: 73 IU/L (ref 44–121)
ALT SERPL-CCNC: 29 IU/L (ref 0–44)
AST SERPL-CCNC: 22 IU/L (ref 0–40)
BASOPHILS # BLD AUTO: 0.1 X10E3/UL (ref 0–0.2)
BASOPHILS NFR BLD AUTO: 1 %
BILIRUB SERPL-MCNC: 0.4 MG/DL (ref 0–1.2)
BUN SERPL-MCNC: 14 MG/DL (ref 6–24)
BUN/CREAT SERPL: 13 (ref 9–20)
CALCIUM SERPL-MCNC: 9.4 MG/DL (ref 8.7–10.2)
CHLORIDE SERPL-SCNC: 108 MMOL/L (ref 96–106)
CHOLEST SERPL-MCNC: 123 MG/DL (ref 100–199)
CHOLEST/HDLC SERPL: 2.9 RATIO (ref 0–5)
CO2 SERPL-SCNC: 23 MMOL/L (ref 20–29)
CREAT SERPL-MCNC: 1.07 MG/DL (ref 0.76–1.27)
EGFR: 82 ML/MIN/1.73
EOSINOPHIL # BLD AUTO: 0.3 X10E3/UL (ref 0–0.4)
EOSINOPHIL NFR BLD AUTO: 5 %
ERYTHROCYTE [DISTWIDTH] IN BLOOD BY AUTOMATED COUNT: 13.9 % (ref 11.6–15.4)
GLOBULIN SER-MCNC: 2.2 G/DL (ref 1.5–4.5)
GLUCOSE SERPL-MCNC: 102 MG/DL (ref 70–99)
HCT VFR BLD AUTO: 42.8 % (ref 37.5–51)
HDLC SERPL-MCNC: 42 MG/DL
HGB BLD-MCNC: 14.2 G/DL (ref 13–17.7)
IMM GRANULOCYTES # BLD: 0 X10E3/UL (ref 0–0.1)
IMM GRANULOCYTES NFR BLD: 0 %
LDLC SERPL CALC-MCNC: 54 MG/DL (ref 0–99)
LYMPHOCYTES # BLD AUTO: 1.9 X10E3/UL (ref 0.7–3.1)
LYMPHOCYTES NFR BLD AUTO: 28 %
MCH RBC QN AUTO: 29.6 PG (ref 26.6–33)
MCHC RBC AUTO-ENTMCNC: 33.2 G/DL (ref 31.5–35.7)
MCV RBC AUTO: 89 FL (ref 79–97)
MONOCYTES # BLD AUTO: 0.6 X10E3/UL (ref 0.1–0.9)
MONOCYTES NFR BLD AUTO: 8 %
NEUTROPHILS # BLD AUTO: 3.9 X10E3/UL (ref 1.4–7)
NEUTROPHILS NFR BLD AUTO: 58 %
PLATELET # BLD AUTO: 190 X10E3/UL (ref 150–450)
POTASSIUM SERPL-SCNC: 5 MMOL/L (ref 3.5–5.2)
PROT SERPL-MCNC: 6.6 G/DL (ref 6–8.5)
RBC # BLD AUTO: 4.79 X10E6/UL (ref 4.14–5.8)
SL AMB VLDL CHOLESTEROL CALC: 27 MG/DL (ref 5–40)
SODIUM SERPL-SCNC: 143 MMOL/L (ref 134–144)
TRIGL SERPL-MCNC: 160 MG/DL (ref 0–149)
TSH SERPL DL<=0.005 MIU/L-ACNC: 4.47 UIU/ML (ref 0.45–4.5)
WBC # BLD AUTO: 6.8 X10E3/UL (ref 3.4–10.8)

## 2024-03-25 ENCOUNTER — TELEPHONE (OUTPATIENT)
Dept: CARDIOLOGY CLINIC | Facility: CLINIC | Age: 54
End: 2024-03-25

## 2024-03-25 NOTE — TELEPHONE ENCOUNTER
----- Message from Susannah Byrnes MD sent at 3/25/2024  4:30 PM EDT -----  Patient blood test reviewed.  They are acceptable.  Will continue same medication.  Patient should keep his appointment for follow-up.

## 2024-04-16 ENCOUNTER — HOSPITAL ENCOUNTER (OUTPATIENT)
Dept: NON INVASIVE DIAGNOSTICS | Facility: HOSPITAL | Age: 54
Discharge: HOME | End: 2024-04-16
Attending: INTERNAL MEDICINE
Payer: COMMERCIAL

## 2024-04-16 VITALS
DIASTOLIC BLOOD PRESSURE: 68 MMHG | WEIGHT: 244 LBS | BODY MASS INDEX: 34.93 KG/M2 | HEIGHT: 70 IN | HEART RATE: 72 BPM | SYSTOLIC BLOOD PRESSURE: 121 MMHG

## 2024-04-16 DIAGNOSIS — J43.2 CENTRILOBULAR EMPHYSEMA (HCC): ICD-10-CM

## 2024-04-16 DIAGNOSIS — R06.09 DOE (DYSPNEA ON EXERTION): ICD-10-CM

## 2024-04-16 DIAGNOSIS — E78.5 DYSLIPIDEMIA: ICD-10-CM

## 2024-04-16 DIAGNOSIS — I10 UNCONTROLLED HYPERTENSION: ICD-10-CM

## 2024-04-16 DIAGNOSIS — G47.33 OSA (OBSTRUCTIVE SLEEP APNEA): ICD-10-CM

## 2024-04-16 DIAGNOSIS — I42.0 DILATED CARDIOMYOPATHY (HCC): ICD-10-CM

## 2024-04-16 DIAGNOSIS — I25.10 CORONARY ARTERY DISEASE INVOLVING NATIVE CORONARY ARTERY OF NATIVE HEART WITHOUT ANGINA PECTORIS: ICD-10-CM

## 2024-04-16 DIAGNOSIS — Z78.9 ALCOHOL USE: ICD-10-CM

## 2024-04-16 DIAGNOSIS — I50.42 CHRONIC COMBINED SYSTOLIC AND DIASTOLIC HEART FAILURE, NYHA CLASS 2 (HCC): ICD-10-CM

## 2024-04-16 PROCEDURE — 93306 TTE W/DOPPLER COMPLETE: CPT | Performed by: INTERNAL MEDICINE

## 2024-04-16 PROCEDURE — 93306 TTE W/DOPPLER COMPLETE: CPT

## 2024-04-18 ENCOUNTER — TELEPHONE (OUTPATIENT)
Age: 54
End: 2024-04-18

## 2024-04-18 LAB
AORTIC ROOT: 3.5 CM
BSA FOR ECHO PROCEDURE: 2.27 M2
E WAVE DECELERATION TIME: 208 MS
E/A RATIO: 0.98
FRACTIONAL SHORTENING: 26 (ref 28–44)
INTERVENTRICULAR SEPTUM IN DIASTOLE (PARASTERNAL SHORT AXIS VIEW): 1.3 CM
INTERVENTRICULAR SEPTUM: 1.3 CM (ref 0.6–1.1)
LAAS-AP2: 21.8 CM2
LAAS-AP4: 21 CM2
LEFT ATRIUM SIZE: 4 CM
LEFT ATRIUM VOLUME (MOD BIPLANE): 64 ML
LEFT ATRIUM VOLUME INDEX (MOD BIPLANE): 28.2 ML/M2
LEFT INTERNAL DIMENSION IN SYSTOLE: 3.7 CM (ref 2.1–4)
LEFT VENTRICULAR INTERNAL DIMENSION IN DIASTOLE: 5 CM (ref 3.5–6)
LEFT VENTRICULAR POSTERIOR WALL IN END DIASTOLE: 1.3 CM
LEFT VENTRICULAR STROKE VOLUME: 60 ML
LVSV (TEICH): 60 ML
MV E'TISSUE VEL-LAT: 9 CM/S
MV E'TISSUE VEL-SEP: 9 CM/S
MV PEAK A VEL: 0.93 M/S
MV PEAK E VEL: 91 CM/S
MV STENOSIS PRESSURE HALF TIME: 60 MS
MV VALVE AREA P 1/2 METHOD: 3.67
RIGHT VENTRICLE ID DIMENSION: 3.4 CM
SL CV LEFT ATRIUM LENGTH A2C: 6.3 CM
SL CV LV EF: 54
SL CV PED ECHO LEFT VENTRICLE DIASTOLIC VOLUME (MOD BIPLANE) 2D: 119 ML
SL CV PED ECHO LEFT VENTRICLE SYSTOLIC VOLUME (MOD BIPLANE) 2D: 59 ML
TR MAX PG: 24 MMHG
TR PEAK VELOCITY: 2.5 M/S
TRICUSPID ANNULAR PLANE SYSTOLIC EXCURSION: 2.3 CM
TRICUSPID VALVE PEAK REGURGITATION VELOCITY: 2.46 M/S

## 2024-04-18 NOTE — TELEPHONE ENCOUNTER
Caller: Tracie Garnica    Doctor: Dr. Byrnes    Reason for call: Echo results/cholesterol level recent blood work    Call back#: [phone]    Patient would like a call back regarding above.

## 2024-04-22 ENCOUNTER — TELEPHONE (OUTPATIENT)
Dept: CARDIOLOGY CLINIC | Facility: CLINIC | Age: 54
End: 2024-04-22

## 2024-04-22 NOTE — TELEPHONE ENCOUNTER
We are getting double messages,  we also get a message from the nurses and from you guys.  I already answered his echo result.  We may need to streamline this process.

## 2024-04-22 NOTE — TELEPHONE ENCOUNTER
----- Message from Susannah Byrnes MD sent at 4/22/2024  9:49 AM EDT -----  Patient's heart function has remained low normal 50 to 55%.  He should keep his appointment.  Continue same medication and compliant with follow-up and lifestyle changes.

## 2024-06-03 DIAGNOSIS — E78.5 DYSLIPIDEMIA: ICD-10-CM

## 2024-06-03 RX ORDER — ATORVASTATIN CALCIUM 40 MG/1
40 TABLET, FILM COATED ORAL
Qty: 90 TABLET | Refills: 1 | Status: SHIPPED | OUTPATIENT
Start: 2024-06-03

## 2024-06-03 NOTE — TELEPHONE ENCOUNTER
Reason for call:   [x] Refill   [] Prior Auth  [] Other:     Office:   [] PCP/Provider -   [x] Specialty/Provider - Susannah Byrnes MD     Medication: atorvastatin (LIPITOR) 40 mg tablet     Dose/Frequency: take 1 tablet by mouth once daily WITH DINNER     Quantity: 90    Pharmacy:   [id number] - [address] ( [address]       Does the patient have enough for 3 days?   [] Yes   [x] No - Send as HP to POD

## 2024-06-24 DIAGNOSIS — I50.42 CHRONIC COMBINED SYSTOLIC AND DIASTOLIC HEART FAILURE, NYHA CLASS 2 (HCC): ICD-10-CM

## 2024-06-24 RX ORDER — SPIRONOLACTONE 25 MG/1
12.5 TABLET ORAL DAILY
Qty: 45 TABLET | Refills: 1 | Status: SHIPPED | OUTPATIENT
Start: 2024-06-24

## 2024-10-09 DIAGNOSIS — I50.42 CHRONIC COMBINED SYSTOLIC AND DIASTOLIC HEART FAILURE, NYHA CLASS 2 (HCC): ICD-10-CM

## 2024-10-09 DIAGNOSIS — I50.43 ACUTE ON CHRONIC COMBINED SYSTOLIC AND DIASTOLIC CHF (CONGESTIVE HEART FAILURE) (HCC): ICD-10-CM

## 2024-10-09 NOTE — TELEPHONE ENCOUNTER
Reason for call:   [x] Refill   [] Prior Auth  [] Other:     Office:   [] PCP/Provider -   [x] Specialty/Provider - cardiology     Medication:   furosemide (LASIX) 20 mg tablet     Dose/Frequency: Sig: Take 1 tablet (20 mg total) by mouth daily    Quantity: 30    [facility] [facility]    Does the patient have enough for 3 days?   [x] Yes   [] No - Send as HP to POD

## 2024-10-10 RX ORDER — SPIRONOLACTONE 25 MG/1
12.5 TABLET ORAL DAILY
Qty: 45 TABLET | Refills: 1 | Status: SHIPPED | OUTPATIENT
Start: 2024-10-10

## 2024-10-10 RX ORDER — FUROSEMIDE 20 MG
20 TABLET ORAL DAILY
Qty: 30 TABLET | Refills: 5 | Status: SHIPPED | OUTPATIENT
Start: 2024-10-10

## 2024-11-04 DIAGNOSIS — I50.43 ACUTE ON CHRONIC COMBINED SYSTOLIC AND DIASTOLIC CHF (CONGESTIVE HEART FAILURE) (HCC): ICD-10-CM

## 2024-11-05 RX ORDER — FUROSEMIDE 20 MG/1
20 TABLET ORAL DAILY
Qty: 90 TABLET | Refills: 1 | Status: SHIPPED | OUTPATIENT
Start: 2024-11-05

## 2024-12-11 ENCOUNTER — TELEPHONE (OUTPATIENT)
Age: 54
End: 2024-12-11

## 2024-12-11 DIAGNOSIS — G47.33 OSA (OBSTRUCTIVE SLEEP APNEA): Primary | ICD-10-CM

## 2024-12-11 NOTE — TELEPHONE ENCOUNTER
Patient calling stating last night his CPAP machine broke where the cord plugs into the machine. He needs a new one ASAP it is broken beyond repair. He called insurance and was advised to call pul for a new script. Patient is due for a follow up, scheduled for 12/30 with Laurita. Also asking to place an order for CPAP supplies his mask is broken as well.

## 2024-12-11 NOTE — TELEPHONE ENCOUNTER
Notes reviewed.  He was last seen in June 2022 by Dr. Oropeza and auto CPAP 6-20 cmH2O was ordered.  I do not see a compliance visit thereafter.  He has an appointment scheduled within the next few weeks to reestablish care. I did order a new CPAP per the old settings, as well as supplies. Please process order.

## 2024-12-17 NOTE — TELEPHONE ENCOUNTER
Patient calling stating they received a email from [employer] about not having the machine, looking for another supplier. Can we find out what is needed from [employer], if they need a new script for a diff machine or we need a new DME, thank you.

## 2024-12-18 NOTE — TELEPHONE ENCOUNTER
Pt called back to follow up with CPAP machine. Informed him of the message that Katheryn spoke with [facility] and the troubleshooting department. [facility] informed her that they were not able to call him yet. They need to determine if the machine is actually broken beyond repair. If not, he is not due for a new machine until 2027 and would be just performing repairs on the current machine. They put in another request to the troubleshooting department to reach out to the pt. They will give him a call within the next 24-48 hours.     Pt was upset about this as he needs his machine in order to sleep. He stated that the tubing came out and then ripped the connection cord and that he needs a new machine. Informed him that we can call [employer] again to see if someone could reach out and explain it to him.       Spoke with [employer] and asked if they could have someone reach out to pt to go over reason for having to take time to assess machine problem. She states she will have someone call the patient today.

## 2024-12-18 NOTE — TELEPHONE ENCOUNTER
Pt calling in stating he now received a text from [employer] that his order was cancelled, he is unsure why.    Called and spoke with [employer] who states that the troubleshooting department was unable to call the pt yet. They first need to determine if the machine is actually broken beyond repair. If not, he is not due for a new machine until 2027 so they would have to repair the current machine. They will put in another request for the troubleshooting department to reach out to the pt. He should expect a phone call within the next 24-48 hours from them.    Called pt back to update him, left VM requesting call back.

## 2025-01-02 DIAGNOSIS — I50.42 CHRONIC COMBINED SYSTOLIC AND DIASTOLIC CONGESTIVE HEART FAILURE, NYHA CLASS 2 (HCC): ICD-10-CM

## 2025-01-02 DIAGNOSIS — I10 UNCONTROLLED HYPERTENSION: ICD-10-CM

## 2025-01-03 RX ORDER — CARVEDILOL 25 MG/1
25 TABLET ORAL 2 TIMES DAILY WITH MEALS
Qty: 180 TABLET | Refills: 1 | Status: SHIPPED | OUTPATIENT
Start: 2025-01-03

## 2025-01-03 RX ORDER — LISINOPRIL 10 MG/1
10 TABLET ORAL DAILY
Qty: 90 TABLET | Refills: 1 | Status: SHIPPED | OUTPATIENT
Start: 2025-01-03

## 2025-01-06 ENCOUNTER — TELEPHONE (OUTPATIENT)
Age: 55
End: 2025-01-06

## 2025-01-06 ENCOUNTER — OFFICE VISIT (OUTPATIENT)
Dept: PULMONOLOGY | Facility: MEDICAL CENTER | Age: 55
End: 2025-01-06
Payer: COMMERCIAL

## 2025-01-06 VITALS
SYSTOLIC BLOOD PRESSURE: 146 MMHG | OXYGEN SATURATION: 99 % | TEMPERATURE: 97.1 F | BODY MASS INDEX: 38.02 KG/M2 | RESPIRATION RATE: 12 BRPM | DIASTOLIC BLOOD PRESSURE: 92 MMHG | WEIGHT: 265.6 LBS | HEIGHT: 70 IN | HEART RATE: 91 BPM

## 2025-01-06 DIAGNOSIS — G47.33 OSA (OBSTRUCTIVE SLEEP APNEA): ICD-10-CM

## 2025-01-06 DIAGNOSIS — E66.01 CLASS 2 SEVERE OBESITY WITH BODY MASS INDEX (BMI) OF 35 TO 39.9 WITH SERIOUS COMORBIDITY (HCC): Primary | ICD-10-CM

## 2025-01-06 DIAGNOSIS — F17.211 CIGARETTE NICOTINE DEPENDENCE IN REMISSION: ICD-10-CM

## 2025-01-06 DIAGNOSIS — Z87.891 FORMER SMOKER: ICD-10-CM

## 2025-01-06 DIAGNOSIS — E66.812 CLASS 2 SEVERE OBESITY WITH BODY MASS INDEX (BMI) OF 35 TO 39.9 WITH SERIOUS COMORBIDITY (HCC): Primary | ICD-10-CM

## 2025-01-06 PROCEDURE — 99213 OFFICE O/P EST LOW 20 MIN: CPT | Performed by: NURSE PRACTITIONER

## 2025-01-06 NOTE — TELEPHONE ENCOUNTER
[facility]) called requested patients office visit notes discussing patients broken cpap machine. Yara stated patients insurance is needing that documentation to process new cpap request. Please fax office visit notes from 1/6/2025 to fax provided.       [facility]  FAX: [phone]

## 2025-01-06 NOTE — ASSESSMENT & PLAN NOTE
Patient has 25-pack-year history of smoking endorses quitting May 2014.  CT lung screening due  Order placed

## 2025-01-06 NOTE — ASSESSMENT & PLAN NOTE
Home sleep study completed in 2020 shows severe obstructive sleep apnea  Overall AHI 44.1 with hypoxia for 22% of his sleep    He has been on CPAP for several years and endorses compliance.  He presents to the office today because he broke his machine beyond repair.  Order for new CPAP machine placed.  Will schedule 4-month compliance  Continue same settings as previous PAP machine

## 2025-01-06 NOTE — ASSESSMENT & PLAN NOTE
Diet and lifestyle modifications discussed with patient.    Weight gain and loss effects on sleep apnea discussed  Weight loss encouraged  Decrease caloric intake, increase daily activity

## 2025-01-06 NOTE — PROGRESS NOTES
Pulmonary Follow-Up Note   Tracie Garnica 54 y.o. male MRN: [id number]  1/6/2025      Assessment/Plan:    Problem List Items Addressed This Visit       PARKER (obstructive sleep apnea)    Home sleep study completed in 2020 shows severe obstructive sleep apnea  Overall AHI 44.1 with hypoxia for 22% of his sleep    He has been on CPAP for several years and endorses compliance.  He presents to the office today because he broke his machine beyond repair.  Order for new CPAP machine placed.  Will schedule 4-month compliance  Continue same settings as previous PAP machine         Former smoker    Patient has 25-pack-year history of smoking endorses quitting May 2014.  CT lung screening due  Order placed             Class 2 severe obesity with body mass index (BMI) of 35 to 39.9 with serious comorbidity (HCC) - Primary    Diet and lifestyle modifications discussed with patient.    Weight gain and loss effects on sleep apnea discussed  Weight loss encouraged  Decrease caloric intake, increase daily activity            Other Visit Diagnoses         Cigarette nicotine dependence in remission        Relevant Orders    CT lung screening program            No follow-ups on file.    All of Tracie's questions were answered prior to leaving the office today. He will follow-up in 4 months or sooner should the need arise. He is aware to call our office with any further questions or concerns.    History of Present Illness   Reason for Visit: CPAP follow up  Chief Complaint: CPAP machine broke  HPI: Tracie Garnica is a 54 y.o. male who presents to the office today for follow-up for PARKER.  Patient endorses his CPAP machine recently breaking.  He has not been seen in the office since 2022 and needed a face-to-face visit in order to order new machine.  Patient endorses compliance with CPAP machine, and difficulty sleeping without it.  We will place order for new machine.      Patient has 25-pack-year history of smoking endorses quitting 10 years  ago  Ct Lung screening ordered  I discussed with him that he is a candidate for lung cancer CT screening.     The following Shared Decision-Making points were covered:  Benefits of screening were discussed, including the rates of reduction in death from lung cancer and other causes.  Harms of screening were reviewed, including false positive tests, radiation exposure levels, risks of invasive procedures, risks of complications of screening, and risk of overdiagnosis.  I counseled on the importance of adherence to annual lung cancer LDCT screening, impact of co-morbidities, and ability or willingness to undergo diagnosis and treatment.  I counseled on the importance of maintaining abstinence as a former smoker or was counseled on the importance of smoking cessation if a current smoker    Review of Eligibility Criteria: He meets all of the criteria for Lung Cancer Screening.   He is 54 y.o.   He has 20 pack year tobacco history and is a current smoker or has quit within the past 15 years  He presents no signs or symptoms of lung cancer    After discussion, the patient decided to elect lung cancer screening.      Review of Systems   Constitutional:  Negative for appetite change and fever.   HENT:  Negative for ear pain, postnasal drip, rhinorrhea, sneezing, sore throat and trouble swallowing.    Respiratory:  Positive for cough and wheezing.    Cardiovascular:  Negative for chest pain.   Neurological:  Negative for headaches.   Physical.        Historical Information   Past Medical History:   Diagnosis Date    Acute hypoxemic respiratory failure (HCC)     Anxiety     Cardiomyopathy (HCC)     Centrilobular emphysema (HCC) 5/20/2020    CHF (congestive heart failure) (HCC)     55% no pacer needed    Combined systolic and diastolic cardiac dysfunction     Coronary artery disease     Depression     Hypertension     Mixed sleep apnea     Obesity     Secondary pulmonary arterial hypertension (HCC)     Substance abuse (HCC)      marijuana cookie some time for sleeping.     Volume overload      Past Surgical History:   Procedure Laterality Date    CHOLECYSTECTOMY      FRACTURE SURGERY      jaw    KNEE SURGERY      MANDIBLE SURGERY      TONSILLECTOMY       Family History   Problem Relation Age of Onset    Heart disease Father     Cancer Father     Cancer Mother     Colon cancer Mother     No Known Problems Sister      Social History   Social History     Substance and Sexual Activity   Alcohol Use Yes    Alcohol/week: 2.0 standard drinks of alcohol    Types: 2 Cans of beer per week    Comment: Last drink Monday 9/26     Social History     Substance and Sexual Activity   Drug Use Yes    Types: Marijuana    Comment: pot cookie once in a while for sleeping      Social History     Tobacco Use   Smoking Status Former    Current packs/day: 0.00    Average packs/day: 1 pack/day for 25.0 years (25.0 ttl pk-yrs)    Types: Cigarettes    Start date: 5/1/1989    Quit date: 5/1/2014    Years since quitting: 10.6   Smokeless Tobacco Never     E-Cigarette/Vaping    E-Cigarette Use Never User      E-Cigarette/Vaping Substances    Nicotine No     THC No     CBD No     Flavoring No     Other No     Unknown No        Meds/Allergies     Current Outpatient Medications:     aspirin 81 mg chewable tablet, Chew 1 tablet (81 mg total) daily, Disp: 30 tablet, Rfl: 0    atorvastatin (LIPITOR) 40 mg tablet, Take 1 tablet (40 mg total) by mouth daily with dinner, Disp: 90 tablet, Rfl: 1    carvedilol (COREG) 25 mg tablet, take 1 tablet by mouth twice a day with meals, Disp: 180 tablet, Rfl: 1    furosemide (LASIX) 20 mg tablet, take 1 tablet by mouth once daily, Disp: 90 tablet, Rfl: 1    lisinopril (ZESTRIL) 10 mg tablet, take 1 tablet by mouth once daily, Disp: 90 tablet, Rfl: 1    Omeprazole-Sodium Bicarbonate (ZEGERID OTC PO), Take by mouth daily, Disp: , Rfl:     spironolactone (ALDACTONE) 25 mg tablet, take 1/2 tablet by mouth once daily, Disp: 45 tablet, Rfl:  "1    famotidine (PEPCID) 20 mg tablet, Take 20 mg by mouth 2 (two) times a day (Patient not taking: Reported on 7/27/2021), Disp: , Rfl:     levothyroxine 50 mcg tablet, Take 0.5 tablets (25 mcg total) by mouth daily in the early morning (Patient not taking: Reported on 11/22/2022), Disp: 90 tablet, Rfl: 3  Allergies   Allergen Reactions    Cheese - Food Allergy      Fever, nasal congestion    Chocolate - Food Allergy      Fever, nasal congestion    Nuts - Food Allergy      Fever, nasal congestion       Vitals: Blood pressure 146/92, pulse 91, temperature (!) 97.1 °F (36.2 °C), temperature source Temporal, resp. rate 12, height 5' 10\" (1.778 m), weight 120 kg (265 lb 9.6 oz), SpO2 99%. Body mass index is 38.11 kg/m². Oxygen Therapy  SpO2: 99 %RA    Physical Exam:  Physical Exam  Vitals reviewed.   Constitutional:       General: He is not in acute distress.     Appearance: He is not ill-appearing.   HENT:      Head: Normocephalic and atraumatic.      Right Ear: External ear normal.      Left Ear: External ear normal.      Nose: Nose normal.      Mouth/Throat:      Mouth: Mucous membranes are moist.      Pharynx: Oropharynx is clear.   Eyes:      Extraocular Movements: Extraocular movements intact.      Pupils: Pupils are equal, round, and reactive to light.   Cardiovascular:      Rate and Rhythm: Normal rate and regular rhythm.      Pulses: Normal pulses.      Heart sounds: Normal heart sounds.   Pulmonary:      Effort: Pulmonary effort is normal.      Breath sounds: Normal breath sounds.   Abdominal:      General: Bowel sounds are normal. There is distension.   Musculoskeletal:         General: Normal range of motion.      Cervical back: Normal range of motion and neck supple.   Skin:     General: Skin is warm and dry.   Neurological:      Mental Status: He is alert and oriented to person, place, and time.   Psychiatric:         Mood and Affect: Mood normal.         Behavior: Behavior normal.         Thought " "Content: Thought content normal.         Judgment: Judgment normal.       Imaging and other studies: Results Review Statement: I reviewed radiology reports from this admission including: CT chest.   CT Lung screening program  ordered but not completed    Pulmonary Results (PFTs, PSG): Results Review Statement: I reviewed radiology reports from this admission including: Sleep study.   Home sleep study 06/25/2020 shows severe obstructive sleep apnea  DICKSON 44.1, and hypoxia for 22% of the night    Gretchen Potter. [facility]    Portions of the record may have been created with voice recognition software.  Occasional wrong word or \"sound a like\" substitutions may have occurred due to the inherent limitations of voice recognition software.  Read the chart carefully and recognize, using context, where substitutions have occurred or contact the dictating provider.  Answers submitted by the patient for this visit:  Pulmonology Questionnaire (Submitted on 1/3/2025)  Chief Complaint: Primary symptoms  Chronicity: chronic  When did you first notice your symptoms?: more than 1 year ago  How often do your symptoms occur?: daily  Since you first noticed this problem, how has it changed?: unchanged  Do you have shortness of breath that occurs with effort or exertion?: No  Do you have ear congestion?: No  Do you have heartburn?: No  Do you have fatigue?: No  Do you have shortness of breath when lying flat?: No  Do you have shortness of breath when you wake up?: No  Do you have sweats?: Yes  Have you experienced weight loss?: No  Which of the following makes your symptoms worse?: nothing  Which of the following makes your symptoms better?: nothing    "

## 2025-01-09 LAB

## 2025-01-17 LAB

## 2025-01-23 LAB
DME PARACHUTE DELIVERY DATE ACTUAL: NORMAL
DME PARACHUTE DELIVERY DATE REQUESTED: NORMAL
DME PARACHUTE DELIVERY NOTE: NORMAL
DME PARACHUTE ITEM DESCRIPTION: NORMAL
DME PARACHUTE ORDER STATUS: NORMAL
DME PARACHUTE SUPPLIER NAME: NORMAL
DME PARACHUTE SUPPLIER PHONE: NORMAL

## 2025-01-24 ENCOUNTER — TELEPHONE (OUTPATIENT)
Dept: CARDIOLOGY CLINIC | Facility: CLINIC | Age: 55
End: 2025-01-24

## 2025-01-24 NOTE — TELEPHONE ENCOUNTER
Patient called requesting refill for spironolactone. Patient made aware medication was refilled on 10/10 for 45 with 1 refills to [facility] pharmacy. Patient instructed to contact the pharmacy to obtain refills of medication. Patient verbalized understanding.

## 2025-03-22 DIAGNOSIS — E78.5 DYSLIPIDEMIA: ICD-10-CM

## 2025-03-24 RX ORDER — ATORVASTATIN CALCIUM 40 MG/1
40 TABLET, FILM COATED ORAL
Qty: 90 TABLET | Refills: 1 | Status: SHIPPED | OUTPATIENT
Start: 2025-03-24

## 2025-04-24 ENCOUNTER — OFFICE VISIT (OUTPATIENT)
Dept: PULMONOLOGY | Facility: MEDICAL CENTER | Age: 55
End: 2025-04-24
Payer: COMMERCIAL

## 2025-04-24 VITALS
HEIGHT: 70 IN | BODY MASS INDEX: 37.37 KG/M2 | RESPIRATION RATE: 12 BRPM | TEMPERATURE: 97.3 F | DIASTOLIC BLOOD PRESSURE: 84 MMHG | HEART RATE: 62 BPM | SYSTOLIC BLOOD PRESSURE: 128 MMHG | OXYGEN SATURATION: 98 % | WEIGHT: 261 LBS

## 2025-04-24 DIAGNOSIS — F17.211 CIGARETTE NICOTINE DEPENDENCE IN REMISSION: ICD-10-CM

## 2025-04-24 DIAGNOSIS — G47.33 OSA (OBSTRUCTIVE SLEEP APNEA): Primary | ICD-10-CM

## 2025-04-24 DIAGNOSIS — E66.01 CLASS 2 SEVERE OBESITY WITH BODY MASS INDEX (BMI) OF 35 TO 39.9 WITH SERIOUS COMORBIDITY (HCC): ICD-10-CM

## 2025-04-24 DIAGNOSIS — E66.812 CLASS 2 SEVERE OBESITY WITH BODY MASS INDEX (BMI) OF 35 TO 39.9 WITH SERIOUS COMORBIDITY (HCC): ICD-10-CM

## 2025-04-24 PROBLEM — J96.11 CHRONIC RESPIRATORY FAILURE WITH HYPOXIA (HCC): Status: RESOLVED | Noted: 2020-05-20 | Resolved: 2025-04-24

## 2025-04-24 PROBLEM — J81.1 PULMONARY EDEMA: Status: RESOLVED | Noted: 2020-05-01 | Resolved: 2025-04-24

## 2025-04-24 PROBLEM — G47.19 DAYTIME HYPERSOMNOLENCE: Status: RESOLVED | Noted: 2020-05-20 | Resolved: 2025-04-24

## 2025-04-24 PROBLEM — R06.09 DOE (DYSPNEA ON EXERTION): Status: RESOLVED | Noted: 2020-05-01 | Resolved: 2025-04-24

## 2025-04-24 PROBLEM — J43.2 CENTRILOBULAR EMPHYSEMA (HCC): Status: RESOLVED | Noted: 2020-05-20 | Resolved: 2025-04-24

## 2025-04-24 PROBLEM — R06.01 ORTHOPNEA: Status: RESOLVED | Noted: 2020-05-01 | Resolved: 2025-04-24

## 2025-04-24 PROBLEM — J96.00 ACUTE RESPIRATORY FAILURE (HCC): Status: RESOLVED | Noted: 2020-05-01 | Resolved: 2025-04-24

## 2025-04-24 PROCEDURE — 99214 OFFICE O/P EST MOD 30 MIN: CPT | Performed by: NURSE PRACTITIONER

## 2025-04-24 NOTE — PROGRESS NOTES
Follow-up  Visit - Pulmonary Medicine   Name: Tracie Garnica      : 1970      MRN: [id number]  Encounter Provider: BRISEIDA Ríos  Encounter Date: 2025   Encounter department: [facility]  :  Assessment & Plan  PARKER (obstructive sleep apnea)  Compliance data was reviewed for his auto CPAP set from 6 to 20 cm water.  He is averaging 9 hours and 55 minutes nightly and he wears it every night.  He will continue with nightly use and is deriving medical benefit.  His AHI with use is 1.3 and his median pressure is 11.8 with a maximum pressure of 15.8.  He has no significant leak.  He is aware of proper use and maintenance of his machine and gets regular resupply.       Class 2 severe obesity with body mass index (BMI) of 35 to 39.9 with serious comorbidity (HCC)  He will continue with lifestyle modifications and weight loss as able.  He recently lost 10 pounds and has been increasing his activity.       Cigarette nicotine dependence in remission  He quit smoking in .  He has a 25-pack-year smoking history.    I discussed with him that he is a candidate for lung cancer CT screening.     The following Shared Decision-Making points were covered:  Benefits of screening were discussed, including the rates of reduction in death from lung cancer and other causes.  Harms of screening were reviewed, including false positive tests, radiation exposure levels, risks of invasive procedures, risks of complications of screening, and risk of overdiagnosis.  I counseled on the importance of adherence to annual lung cancer LDCT screening, impact of co-morbidities, and ability or willingness to undergo diagnosis and treatment.  I counseled on the importance of maintaining abstinence as a former smoker or was counseled on the importance of smoking cessation if a current smoker    Review of Eligibility Criteria: He meets all of the criteria for Lung Cancer Screening.   He is 55 y.o.   He has  "20 pack year tobacco history and is a current smoker or has quit within the past 15 years  He presents no signs or symptoms of lung cancer    After discussion, the patient decided to elect lung cancer screening.  I will call him with the results once available.           Return in about 1 year (around 4/24/2026), or if symptoms worsen or fail to improve.    All of Tracie's questions were answered prior to leaving the office today. He will follow-up in 12 months or sooner should the need arise. He is aware to call our office with any further questions or concerns.      History of Present Illness   Tracie Garnica is a 55 y.o. male who presents for compliance evaluation.  Tracie reports that he is doing well since receiving his new CPAP.  He is wearing it nightly and sleeps 8 to 9 hours.  He did notice that he was given a medium mask and a large mask fits better.  He is awaiting the new mask delivery.  Otherwise, he has no daytime somnolence or difficulty sleeping.  He has no awakening headaches or any other complaints.    Review of Systems   All other systems reviewed and are negative.  Aside from what is mentioned in the HPI, ROS is otherwise negative.     Medical History Reviewed by provider this encounter:  Tobacco  Allergies  Meds  Problems  Med Hx  Surg Hx  Fam Hx     .    Objective   /84 (BP Location: Left arm, Patient Position: Sitting, Cuff Size: Extra-Large)   Pulse 62   Temp (!) 97.3 °F (36.3 °C) (Temporal)   Resp 12   Ht 5' 10\" (1.778 m)   Wt 118 kg (261 lb)   SpO2 98%   BMI 37.45 kg/m²     Physical Exam  Vitals reviewed.   Constitutional:       General: He is not in acute distress.     Appearance: He is well-developed. He is obese. He is not toxic-appearing or diaphoretic.   HENT:      Head: Normocephalic and atraumatic.   Eyes:      General: No scleral icterus.  Neck:      Trachea: No tracheal deviation.   Cardiovascular:      Rate and Rhythm: Normal rate and regular rhythm.      Heart " sounds: S1 normal and S2 normal. No murmur heard.     No friction rub. No gallop.   Pulmonary:      Effort: Pulmonary effort is normal. No tachypnea, accessory muscle usage or respiratory distress.      Breath sounds: Normal breath sounds. No stridor. No decreased breath sounds, wheezing, rhonchi or rales.   Chest:      Chest wall: No tenderness.   Abdominal:      General: Bowel sounds are normal. There is no distension.      Palpations: Abdomen is soft.      Tenderness: There is no abdominal tenderness.   Musculoskeletal:         General: No tenderness.      Cervical back: Neck supple.      Right lower leg: No edema.      Left lower leg: No edema.   Skin:     General: Skin is warm and dry.      Findings: No rash.   Neurological:      Mental Status: He is alert and oriented to person, place, and time.      GCS: GCS eye subscore is 4. GCS verbal subscore is 5. GCS motor subscore is 6.   Psychiatric:         Speech: Speech normal.         Behavior: Behavior normal. Behavior is cooperative.         Diagnostic Data:  No new pulmonary diagnostics      BRISEIDA Ríos

## 2025-04-24 NOTE — ASSESSMENT & PLAN NOTE
Compliance data was reviewed for his auto CPAP set from 6 to 20 cm water.  He is averaging 9 hours and 55 minutes nightly and he wears it every night.  He will continue with nightly use and is deriving medical benefit.  His AHI with use is 1.3 and his median pressure is 11.8 with a maximum pressure of 15.8.  He has no significant leak.  He is aware of proper use and maintenance of his machine and gets regular resupply.

## 2025-04-24 NOTE — ASSESSMENT & PLAN NOTE
He quit smoking in 2014.  He has a 25-pack-year smoking history.    I discussed with him that he is a candidate for lung cancer CT screening.     The following Shared Decision-Making points were covered:  Benefits of screening were discussed, including the rates of reduction in death from lung cancer and other causes.  Harms of screening were reviewed, including false positive tests, radiation exposure levels, risks of invasive procedures, risks of complications of screening, and risk of overdiagnosis.  I counseled on the importance of adherence to annual lung cancer LDCT screening, impact of co-morbidities, and ability or willingness to undergo diagnosis and treatment.  I counseled on the importance of maintaining abstinence as a former smoker or was counseled on the importance of smoking cessation if a current smoker    Review of Eligibility Criteria: He meets all of the criteria for Lung Cancer Screening.   He is 55 y.o.   He has 20 pack year tobacco history and is a current smoker or has quit within the past 15 years  He presents no signs or symptoms of lung cancer    After discussion, the patient decided to elect lung cancer screening.  I will call him with the results once available.

## 2025-04-24 NOTE — ASSESSMENT & PLAN NOTE
He will continue with lifestyle modifications and weight loss as able.  He recently lost 10 pounds and has been increasing his activity.

## 2025-05-08 ENCOUNTER — HOSPITAL ENCOUNTER (OUTPATIENT)
Dept: RADIOLOGY | Facility: HOSPITAL | Age: 55
Discharge: HOME | End: 2025-05-08
Attending: NURSE PRACTITIONER

## 2025-05-08 DIAGNOSIS — F17.211 CIGARETTE NICOTINE DEPENDENCE IN REMISSION: ICD-10-CM

## 2025-05-09 ENCOUNTER — TELEPHONE (OUTPATIENT)
Age: 55
End: 2025-05-09

## 2025-05-14 ENCOUNTER — RESULTS FOLLOW-UP (OUTPATIENT)
Dept: PULMONOLOGY | Facility: MEDICAL CENTER | Age: 55
End: 2025-05-14

## 2025-05-14 DIAGNOSIS — I50.43 ACUTE ON CHRONIC COMBINED SYSTOLIC AND DIASTOLIC CHF (CONGESTIVE HEART FAILURE) (HCC): ICD-10-CM

## 2025-05-14 DIAGNOSIS — F17.211 CIGARETTE NICOTINE DEPENDENCE IN REMISSION: Primary | ICD-10-CM

## 2025-05-14 RX ORDER — FUROSEMIDE 20 MG/1
20 TABLET ORAL DAILY
Qty: 90 TABLET | Refills: 1 | Status: SHIPPED | OUTPATIENT
Start: 2025-05-14

## 2025-05-14 NOTE — TELEPHONE ENCOUNTER
Patient called and said he received a message through [employer] stating the results of their CT were in but he cannot log in to [employer] to get the results can someone please reach out to the patient with the results thank you

## 2025-05-14 NOTE — RESULT ENCOUNTER NOTE
I called and spoke with Tracie regarding his CT results.  All questions answered.  He will have a lung screening CT in May 2026.  I ordered this today and let him know that he can call central scheduling at his convenience to schedule.  He will be due for pulmonary follow-up in May 2026 after his scan.  He can call the office sooner with any questions or concerns.

## 2025-05-15 ENCOUNTER — OFFICE VISIT (OUTPATIENT)
Age: 55
End: 2025-05-15

## 2025-05-15 VITALS
TEMPERATURE: 97.5 F | HEIGHT: 70 IN | RESPIRATION RATE: 19 BRPM | OXYGEN SATURATION: 96 % | BODY MASS INDEX: 36.97 KG/M2 | SYSTOLIC BLOOD PRESSURE: 144 MMHG | WEIGHT: 258.2 LBS | HEART RATE: 85 BPM | DIASTOLIC BLOOD PRESSURE: 92 MMHG

## 2025-05-15 DIAGNOSIS — Z12.5 SCREENING FOR PROSTATE CANCER: ICD-10-CM

## 2025-05-15 DIAGNOSIS — R21 RASH: ICD-10-CM

## 2025-05-15 DIAGNOSIS — E03.9 HYPOTHYROIDISM, UNSPECIFIED TYPE: ICD-10-CM

## 2025-05-15 DIAGNOSIS — I25.10 CORONARY ARTERY DISEASE INVOLVING NATIVE CORONARY ARTERY OF NATIVE HEART WITHOUT ANGINA PECTORIS: ICD-10-CM

## 2025-05-15 DIAGNOSIS — Z23 ENCOUNTER FOR IMMUNIZATION: ICD-10-CM

## 2025-05-15 DIAGNOSIS — K63.5 POLYP OF SIGMOID COLON, UNSPECIFIED TYPE: ICD-10-CM

## 2025-05-15 DIAGNOSIS — I10 BENIGN ESSENTIAL HYPERTENSION: Primary | ICD-10-CM

## 2025-05-15 DIAGNOSIS — G47.00 INSOMNIA, UNSPECIFIED TYPE: ICD-10-CM

## 2025-05-15 DIAGNOSIS — Z00.00 ANNUAL PHYSICAL EXAM: ICD-10-CM

## 2025-05-15 PROCEDURE — 90677 PCV20 VACCINE IM: CPT

## 2025-05-15 PROCEDURE — 99386 PREV VISIT NEW AGE 40-64: CPT | Performed by: FAMILY MEDICINE

## 2025-05-15 PROCEDURE — 90472 IMMUNIZATION ADMIN EACH ADD: CPT

## 2025-05-15 PROCEDURE — 90750 HZV VACC RECOMBINANT IM: CPT

## 2025-05-15 PROCEDURE — 90471 IMMUNIZATION ADMIN: CPT

## 2025-05-15 PROCEDURE — 90715 TDAP VACCINE 7 YRS/> IM: CPT

## 2025-05-15 RX ORDER — ZOLPIDEM TARTRATE 10 MG/1
10 TABLET ORAL
Qty: 30 TABLET | Refills: 0 | Status: SHIPPED | OUTPATIENT
Start: 2025-05-15

## 2025-05-15 RX ORDER — BETAMETHASONE DIPROPIONATE 0.5 MG/G
CREAM TOPICAL 2 TIMES DAILY
Qty: 15 G | Refills: 1 | Status: SHIPPED | OUTPATIENT
Start: 2025-05-15

## 2025-05-15 NOTE — ASSESSMENT & PLAN NOTE
Follows with cardiology and seeing them in a few months.  Has been stable on medications.   Orders:  •  CBC and differential; Future

## 2025-05-15 NOTE — PROGRESS NOTES
Adult Annual Physical  Name: Tracie Garnica      : 1970      MRN: [id number]  Encounter Provider: Jorge Herrera DO  Encounter Date: 5/15/2025   Encounter department: [facility]    :  Assessment & Plan  Encounter for immunization    Orders:  •  Pneumococcal Conjugate Vaccine 20-valent (Pcv20)  •  TDAP VACCINE GREATER THAN OR EQUAL TO 6YO IM  •  Zoster Vaccine Recombinant IM    Benign essential hypertension  RE checked BP and got 144/92.  Did not take med today.  He will take it when he gets home.  Continue to follow  Orders:  •  Lipid panel; Future  •  Comprehensive metabolic panel; Future    Coronary artery disease involving native coronary artery of native heart without angina pectoris  Follows with cardiology and seeing them in a few months.  Has been stable on medications.   Orders:  •  CBC and differential; Future    Hypothyroidism, unspecified type  Has been off of levothyroxine.  Feeling fine.  We will recheck his TSH and see if medications are needed.   Orders:  •  TSH, 3rd generation; Future    Polyp of sigmoid colon, unspecified type  Last colonoscopy 5 yrs ago with rec for repeat in 5 yrs due to family hx of colon ca.  GI  referral placed  Orders:  •  Ambulatory Referral to Gastroenterology; Future    Annual physical exam  Labs ordered.  CBC, CMP, TSH, PSA, Lipid Panel.  Will review results when available and recommend further treatment.    Orders:  •  Hepatitis C antibody; Future  •  HIV 1/2 AB/AG w Reflex [facility] for 2 yr old and above; Future    Screening for prostate cancer    Orders:  •  PSA, Total Screen; Future    Rash  Contact derm.  Treatment as noted to let me know if not improved.   Orders:  •  betamethasone, augmented, (DIPROLENE-AF) 0.05 % cream; Apply topically 2 (two) times a day    Insomnia, unspecified type  Discussed using ambien. He has used in past.  Asked to not use every night but he can use it to get in to a better sleep cycle. He  agrees.   Orders:  •  zolpidem (AMBIEN) 10 mg tablet; Take 1 tablet (10 mg total) by mouth daily at bedtime as needed for sleep      Preventive Screenings:    - Cholesterol Screening: risks/benefits discussed and orders placed   - Hepatitis C screening: risks/benefits discussed and orders placed   - HIV screening: risks/benefits discussed and orders placed   - Colon cancer screening: screening up-to-date   - Lung cancer screening: screening up-to-date   - Prostate cancer screening: risks/benefits discussed and orders placed     Immunizations:  - Immunizations due: Prevnar 20, Tdap and Zoster (Shingrix)  - Risks/benefits immunizations discussed    - Immunizations given per orders      Counseling/Anticipatory Guidance:  - Alcohol: discussed moderation in alcohol intake and recommendations for healthy alcohol use.   - Drug use: discussed harms of illicit drug use and how it can negatively impact mental/physical health.   - Tobacco use: discussed harms of tobacco use and management options for quitting.   - Dental health: discussed importance of regular tooth brushing, flossing, and dental visits.   - Sexual health: discussed sexually transmitted diseases, partner selection, use of condoms, avoidance of unintended pregnancy, and contraceptive alternatives.   - Diet: discussed recommendations for a healthy/well-balanced diet.   - Exercise: the importance of regular exercise/physical activity was discussed. Recommend exercise 3-5 times per week for at least 30 minutes.   - Injury prevention: discussed safety/seat belts, safety helmets, smoke detectors, carbon monoxide detectors, and smoking near bedding or upholstery.     BMI Counseling: Body mass index is 37.05 kg/m². The BMI is above normal. Nutrition recommendations include decreasing portion sizes. Exercise recommendations include exercising 3-5 times per week. No pharmacotherapy was ordered. Rationale for BMI follow-up plan is due to patient being overweight or  "obese.     Depression Screening and Follow-up Plan: Patient was screened for depression during today's encounter. They screened negative with a PHQ-9 score of 3.          History of Present Illness     Adult Annual Physical:  Patient presents for annual physical.     Diet and Physical Activity:  - Diet/Nutrition: no special diet.  - Exercise: walking.    Depression Screening:    - PHQ-9 Score: 3    General Health:  - Sleep: sleeps poorly.  - Hearing: normal hearing bilateral ears.  - Vision: no vision problems.  - Dental: regular dental visits and brushes teeth once daily.     Health:  - History of STDs: no.   - Urinary symptoms: none.     Advanced Care Planning:  - Has an advanced directive?: no    - Has a durable medical POA?: no    - ACP document given to patient?: no      Review of Systems   Constitutional:  Negative for chills and fever.   HENT:  Negative for ear pain and sore throat.    Eyes:  Negative for pain and visual disturbance.   Respiratory:  Negative for cough and shortness of breath.    Cardiovascular:  Negative for chest pain and palpitations.   Gastrointestinal:  Negative for abdominal pain and vomiting.   Genitourinary:  Negative for dysuria and hematuria.   Musculoskeletal:  Negative for arthralgias and back pain.   Skin:  Positive for rash. Negative for color change.        Rash on Right ankle likely irritation from socks.    Neurological:  Negative for seizures and syncope.   All other systems reviewed and are negative.        Objective   /92 (BP Location: Left arm, Patient Position: Sitting, Cuff Size: Large)   Pulse 85   Temp 97.5 °F (36.4 °C) (Tympanic)   Resp 19   Ht 5' 10\" (1.778 m)   Wt 117 kg (258 lb 3.2 oz)   SpO2 96%   BMI 37.05 kg/m²     Physical Exam  Vitals and nursing note reviewed.   Constitutional:       General: He is not in acute distress.     Appearance: He is well-developed.   HENT:      Head: Normocephalic and atraumatic.     Eyes:      Conjunctiva/sclera: " Conjunctivae normal.       Cardiovascular:      Rate and Rhythm: Normal rate and regular rhythm.      Heart sounds: No murmur heard.  Pulmonary:      Effort: Pulmonary effort is normal. No respiratory distress.      Breath sounds: Normal breath sounds.   Abdominal:      Palpations: Abdomen is soft.      Tenderness: There is no abdominal tenderness.     Musculoskeletal:         General: No swelling.      Cervical back: Neck supple.     Skin:     General: Skin is warm and dry.      Capillary Refill: Capillary refill takes less than 2 seconds.      Findings: Rash present.      Comments: Small erruptions right ankle area.  Non spreading.     Neurological:      Mental Status: He is alert.     Psychiatric:         Mood and Affect: Mood normal.

## 2025-05-15 NOTE — PATIENT INSTRUCTIONS
"Your last colonoscopy was 5 years ago and they recommended a repeat in 5 years due to your history of a polyp and family history of colon cancer. I have placed a referral for you to see the gastroenterologist to discuss further testing.  Please make an appointment.    I would like to see you again in 4 months.    We will communicate with you once your lab results come back.   Patient Education     Routine physical for adults   The Basics   Written by the doctors and editors at [facility]   What is a physical? -- A physical is a routine visit, or \"check-up,\" with your doctor. You might also hear it called a \"wellness visit\" or \"preventive visit.\"  During each visit, the doctor will:   Ask about your physical and mental health   Ask about your habits, behaviors, and lifestyle   Do an exam   Give you vaccines if needed   Talk to you about any medicines you take   Give advice about your health   Answer your questions  Getting regular check-ups is an important part of taking care of your health. It can help your doctor find and treat any problems you have. But it's also important for preventing health problems.  A routine physical is different from a \"sick visit.\" A sick visit is when you see a doctor because of a health concern or problem. Since physicals are scheduled ahead of time, you can think about what you want to ask the doctor.  How often should I get a physical? -- It depends on your age and health. In general, for people age 21 years and older:   If you are younger than 50 years, you might be able to get a physical every 3 years.   If you are 50 years or older, your doctor might recommend a physical every year.  If you have an ongoing health condition, like diabetes or high blood pressure, your doctor will probably want to see you more often.  What happens during a physical? -- In general, each visit will include:   Physical exam - The doctor or nurse will check your height, weight, heart rate, and blood " "pressure. They will also look at your eyes and ears. They will ask about how you are feeling and whether you have any symptoms that bother you.   Medicines - It's a good idea to bring a list of all the medicines you take to each doctor visit. Your doctor will talk to you about your medicines and answer any questions. Tell them if you are having any side effects that bother you. You should also tell them if you are having trouble paying for any of your medicines.   Habits and behaviors - This includes:   Your diet   Your exercise habits   Whether you smoke, drink alcohol, or use drugs   Whether you are sexually active   Whether you feel safe at home  Your doctor will talk to you about things you can do to improve your health and lower your risk of health problems. They will also offer help and support. For example, if you want to quit smoking, they can give you advice and might prescribe medicines. If you want to improve your diet or get more physical activity, they can help you with this, too.   Lab tests, if needed - The tests you get will depend on your age and situation. For example, your doctor might want to check your:   Cholesterol   Blood sugar   Iron level   Vaccines - The recommended vaccines will depend on your age, health, and what vaccines you already had. Vaccines are very important because they can prevent certain serious or deadly infections.   Discussion of screening - \"Screening\" means checking for diseases or other health problems before they cause symptoms. Your doctor can recommend screening based on your age, risk, and preferences. This might include tests to check for:   Cancer, such as breast, prostate, cervical, ovarian, colorectal, prostate, lung, or skin cancer   Sexually transmitted infections, such as chlamydia and gonorrhea   Mental health conditions like depression and anxiety  Your doctor will talk to you about the different types of screening tests. They can help you decide which " screenings to have. They can also explain what the results might mean.   Answering questions - The physical is a good time to ask the doctor or nurse questions about your health. If needed, they can refer you to other doctors or specialists, too.  Adults older than 65 years often need other care, too. As you get older, your doctor will talk to you about:   How to prevent falling at home   Hearing or vision tests   Memory testing   How to take your medicines safely   Making sure that you have the help and support you need at home  All topics are updated as new evidence becomes available and our peer review process is complete.  This topic retrieved from [employer] on: May 02, 2024.  Topic 320452 Version 1.0  Release: 32.4.3 - C32.122  © 2024 UpToDate, Inc. and/or its affiliates. All rights reserved.  Consumer Information Use and Disclaimer   Disclaimer: This generalized information is a limited summary of diagnosis, treatment, and/or medication information. It is not meant to be comprehensive and should be used as a tool to help the user understand and/or assess potential diagnostic and treatment options. It does NOT include all information about conditions, treatments, medications, side effects, or risks that may apply to a specific patient. It is not intended to be medical advice or a substitute for the medical advice, diagnosis, or treatment of a health care provider based on the health care provider's examination and assessment of a patient's specific and unique circumstances. Patients must speak with a health care provider for complete information about their health, medical questions, and treatment options, including any risks or benefits regarding use of medications. This information does not endorse any treatments or medications as safe, effective, or approved for treating a specific patient. UpToDate, Inc. and its affiliates disclaim any warranty or liability relating to this information or the use thereof.The  use of this information is governed by the Terms of Use, available at https://www.[employer]uwer.com/en/know/clinical-effectiveness-terms. 2024© UpToDate, Inc. and its affiliates and/or licensors. All rights reserved.  Copyright   © 2024 [employer] and/or its affiliates. All rights reserved.

## 2025-05-15 NOTE — ASSESSMENT & PLAN NOTE
Has been off of levothyroxine.  Feeling fine.  We will recheck his TSH and see if medications are needed.   Orders:  •  TSH, 3rd generation; Future

## 2025-05-15 NOTE — ASSESSMENT & PLAN NOTE
RE checked BP and got 144/92.  Did not take med today.  He will take it when he gets home.  Continue to follow  Orders:  •  Lipid panel; Future  •  Comprehensive metabolic panel; Future

## 2025-05-15 NOTE — ASSESSMENT & PLAN NOTE
Last colonoscopy 5 yrs ago with rec for repeat in 5 yrs due to family hx of colon ca.  GI  referral placed  Orders:  •  Ambulatory Referral to Gastroenterology; Future

## 2025-05-23 ENCOUNTER — TELEPHONE (OUTPATIENT)
Age: 55
End: 2025-05-23

## 2025-05-23 ENCOUNTER — PREP FOR PROCEDURE (OUTPATIENT)
Age: 55
End: 2025-05-23

## 2025-05-23 DIAGNOSIS — Z12.11 SCREENING FOR COLON CANCER: Primary | ICD-10-CM

## 2025-05-23 NOTE — TELEPHONE ENCOUNTER
Scheduled date of colonoscopy (as of today): 10/23/25  Physician performing colonoscopy: Gomez  Location of colonoscopy: [facility]  Bowel prep reviewed with patient: Jacob  Instructions via mail   Clearances: N/A    05/23/25  Screened by: Samaria Byrne MA    Referring Provider     Pre- Screening:     Confirmed Ht, Wt, and BMI on file     Has patient been referred for a routine screening Colonoscopy? yes  Is the patient between 45-75 years old? yes      Previous Colonoscopy yes   If yes:    Date: 20    Facility:     Reason:       Does the patient want to see a Gastroenterologist prior to their procedure OR are they having any GI symptoms? no    Has the patient been hospitalized or had abdominal surgery in the past 6 months? no    Does the patient use supplemental oxygen? no    Does the patient take Coumadin, Lovenox, Plavix, Elliquis, Xarelto, or other blood thinning medication? no    Has the patient had a stroke, cardiac event, or stent placed in the past year? no      If patient is between 45yrs - 49yrs, please advise patient that we will have to confirm benefits & coverage with their insurance company for a routine screening colonoscopy.

## 2025-06-03 ENCOUNTER — RESULTS FOLLOW-UP (OUTPATIENT)
Age: 55
End: 2025-06-03

## 2025-06-03 LAB
ALBUMIN SERPL-MCNC: 4.6 G/DL (ref 3.8–4.9)
ALP SERPL-CCNC: 81 IU/L (ref 44–121)
ALT SERPL-CCNC: 20 IU/L (ref 0–44)
AST SERPL-CCNC: 25 IU/L (ref 0–40)
BASOPHILS # BLD AUTO: 0.1 X10E3/UL (ref 0–0.2)
BASOPHILS NFR BLD AUTO: 1 %
BILIRUB SERPL-MCNC: 0.8 MG/DL (ref 0–1.2)
BUN SERPL-MCNC: 19 MG/DL (ref 6–24)
BUN/CREAT SERPL: 17 (ref 9–20)
CALCIUM SERPL-MCNC: 9.9 MG/DL (ref 8.7–10.2)
CHLORIDE SERPL-SCNC: 107 MMOL/L (ref 96–106)
CHOLEST SERPL-MCNC: 111 MG/DL (ref 100–199)
CHOLEST/HDLC SERPL: 2.6 RATIO (ref 0–5)
CO2 SERPL-SCNC: 16 MMOL/L (ref 20–29)
CREAT SERPL-MCNC: 1.13 MG/DL (ref 0.76–1.27)
EGFR: 76 ML/MIN/1.73
EOSINOPHIL # BLD AUTO: 0.2 X10E3/UL (ref 0–0.4)
EOSINOPHIL NFR BLD AUTO: 2 %
ERYTHROCYTE [DISTWIDTH] IN BLOOD BY AUTOMATED COUNT: 13.1 % (ref 11.6–15.4)
GLOBULIN SER-MCNC: 2.3 G/DL (ref 1.5–4.5)
GLUCOSE SERPL-MCNC: 106 MG/DL (ref 70–99)
HCT VFR BLD AUTO: 44.7 % (ref 37.5–51)
HCV AB S/CO SERPL IA: NON REACTIVE
HDLC SERPL-MCNC: 43 MG/DL
HGB BLD-MCNC: 15.1 G/DL (ref 13–17.7)
IMM GRANULOCYTES # BLD: 0 X10E3/UL (ref 0–0.1)
IMM GRANULOCYTES NFR BLD: 0 %
LDLC SERPL CALC-MCNC: 45 MG/DL (ref 0–99)
LYMPHOCYTES # BLD AUTO: 1.8 X10E3/UL (ref 0.7–3.1)
LYMPHOCYTES NFR BLD AUTO: 24 %
MCH RBC QN AUTO: 31 PG (ref 26.6–33)
MCHC RBC AUTO-ENTMCNC: 33.8 G/DL (ref 31.5–35.7)
MCV RBC AUTO: 92 FL (ref 79–97)
MONOCYTES # BLD AUTO: 0.5 X10E3/UL (ref 0.1–0.9)
MONOCYTES NFR BLD AUTO: 7 %
NEUTROPHILS # BLD AUTO: 4.8 X10E3/UL (ref 1.4–7)
NEUTROPHILS NFR BLD AUTO: 66 %
PLATELET # BLD AUTO: 214 X10E3/UL (ref 150–450)
POTASSIUM SERPL-SCNC: 4.2 MMOL/L (ref 3.5–5.2)
PROT SERPL-MCNC: 6.9 G/DL (ref 6–8.5)
RBC # BLD AUTO: 4.87 X10E6/UL (ref 4.14–5.8)
SL AMB VLDL CHOLESTEROL CALC: 23 MG/DL (ref 5–40)
SODIUM SERPL-SCNC: 144 MMOL/L (ref 134–144)
TRIGL SERPL-MCNC: 133 MG/DL (ref 0–149)
TSH SERPL DL<=0.005 MIU/L-ACNC: 3.13 UIU/ML (ref 0.45–4.5)
WBC # BLD AUTO: 7.4 X10E3/UL (ref 3.4–10.8)

## 2025-06-19 DIAGNOSIS — G47.00 INSOMNIA, UNSPECIFIED TYPE: ICD-10-CM

## 2025-06-19 RX ORDER — ZOLPIDEM TARTRATE 10 MG/1
10 TABLET ORAL
Qty: 30 TABLET | Refills: 0 | Status: SHIPPED | OUTPATIENT
Start: 2025-06-19

## 2025-07-01 ENCOUNTER — OFFICE VISIT (OUTPATIENT)
Age: 55
End: 2025-07-01

## 2025-07-01 VITALS
RESPIRATION RATE: 16 BRPM | SYSTOLIC BLOOD PRESSURE: 143 MMHG | HEIGHT: 70 IN | DIASTOLIC BLOOD PRESSURE: 99 MMHG | TEMPERATURE: 98 F | WEIGHT: 243 LBS | OXYGEN SATURATION: 99 % | BODY MASS INDEX: 34.79 KG/M2 | HEART RATE: 85 BPM

## 2025-07-01 DIAGNOSIS — R19.7 DIARRHEA, UNSPECIFIED TYPE: Primary | ICD-10-CM

## 2025-07-01 PROCEDURE — 99213 OFFICE O/P EST LOW 20 MIN: CPT | Performed by: FAMILY MEDICINE

## 2025-07-01 NOTE — LETTER
July 1, 2025     Patient: Tracie Garnica  YOB: 1970  Date of Visit: 7/1/2025      To Whom it May Concern:    Tracie Garnica is under my professional care. Tracie was seen in my office on 7/1/2025. Tracie may return to work on 07/04/25.  Please excuse him from 06/30/25 - 07/03/25    If you have any questions or concerns, please don't hesitate to call.         Sincerely,          Laila Merchant,         CC: No Recipients

## 2025-07-01 NOTE — PROGRESS NOTES
Name: Tracie Garnica      : 1970      MRN: [id number]  Encounter Provider: Laila Merchant DO  Encounter Date: 2025   Encounter department: [facility]  :  Assessment & Plan  Diarrhea, unspecified type  Patient has been having diarrhea and nausea for the past few days, beginning on ; with 2 episodes of diarrhea yesterday, and 3 episodes today. Patient had not been staying hydrated due to fear of worsening his chronic heart failure.   Tea and toast diet  Drink gatorade, increase hydration, and increase fruit intake (w/ high water content)   ER precautions given   Use tomorrow to rest   Advised to stay in a cool room, to decrease sweating and preserve body fluid               History of Present Illness   Patient is a 55 yr. old male, with PMH of CHF, CAD, HTN, Depression, mixed sleep apnea, PAH, substance abuse history, vertigo, COPD who presents with complaints of diarrhea x1 day ago and nausea x 2 days ago. Patient states he has not been staying hydrated due to his history of heart failure and not wanting to worsen it.   Patient admits sick contacts (roommate had vomiting), nausea, diarrhea (2 bouts yesterday, and 3 bouts today)   Patient denies fever, chills, vomiting, new foods, Abx use, recent travel       Nausea  Associated symptoms include nausea. Pertinent negatives include no abdominal pain, chest pain, chills, fever, headaches, neck pain or vomiting.     Review of Systems   Constitutional:  Positive for appetite change (decreased appetite since onset). Negative for chills and fever.   HENT:  Negative for ear pain.    Eyes:  Negative for photophobia and pain.   Respiratory:  Negative for shortness of breath.    Cardiovascular:  Negative for chest pain.   Gastrointestinal:  Positive for diarrhea and nausea. Negative for abdominal pain and vomiting.   Genitourinary:  Negative for difficulty urinating.   Musculoskeletal:  Negative for back pain and neck  "pain.   Neurological:  Negative for dizziness, light-headedness and headaches.   Psychiatric/Behavioral:  Negative for behavioral problems, confusion and hallucinations.        Objective   /99 (BP Location: Left arm, Patient Position: Sitting, Cuff Size: Large)   Pulse 85   Temp 98 °F (36.7 °C) (Tympanic)   Resp 16   Ht 5' 10\" (1.778 m)   Wt 110 kg (243 lb)   SpO2 99%   BMI 34.87 kg/m²      Physical Exam  Constitutional:       General: He is not in acute distress.     Appearance: Normal appearance. He is obese. He is not ill-appearing or toxic-appearing.   HENT:      Head: Normocephalic and atraumatic.      Nose: Nose normal.      Mouth/Throat:      Mouth: Mucous membranes are moist.      Pharynx: Oropharynx is clear.     Eyes:      Conjunctiva/sclera: Conjunctivae normal.      Pupils: Pupils are equal, round, and reactive to light.       Cardiovascular:      Rate and Rhythm: Normal rate and regular rhythm.      Pulses: Normal pulses.      Heart sounds: Normal heart sounds.   Pulmonary:      Effort: Pulmonary effort is normal. No respiratory distress.      Breath sounds: Normal breath sounds.   Abdominal:      General: Abdomen is flat. Bowel sounds are normal. There is no distension.      Palpations: Abdomen is soft.      Tenderness: There is no abdominal tenderness. There is no guarding or rebound.     Musculoskeletal:         General: Normal range of motion.      Cervical back: Normal range of motion and neck supple.      Right lower leg: No edema.      Left lower leg: No edema.     Skin:     General: Skin is warm and dry.     Neurological:      General: No focal deficit present.      Mental Status: He is alert and oriented to person, place, and time. Mental status is at baseline.     Psychiatric:         Mood and Affect: Mood normal.         Behavior: Behavior normal.         Thought Content: Thought content normal.         Judgment: Judgment normal.         "

## 2025-07-10 ENCOUNTER — OFFICE VISIT (OUTPATIENT)
Dept: CARDIOLOGY CLINIC | Facility: CLINIC | Age: 55
End: 2025-07-10
Payer: COMMERCIAL

## 2025-07-10 VITALS
DIASTOLIC BLOOD PRESSURE: 102 MMHG | OXYGEN SATURATION: 98 % | HEIGHT: 70 IN | BODY MASS INDEX: 35.79 KG/M2 | SYSTOLIC BLOOD PRESSURE: 148 MMHG | HEART RATE: 78 BPM | WEIGHT: 250 LBS

## 2025-07-10 DIAGNOSIS — I25.10 CORONARY ARTERY DISEASE INVOLVING NATIVE CORONARY ARTERY OF NATIVE HEART WITHOUT ANGINA PECTORIS: ICD-10-CM

## 2025-07-10 DIAGNOSIS — I50.42 CHRONIC COMBINED SYSTOLIC AND DIASTOLIC CONGESTIVE HEART FAILURE, NYHA CLASS 2 (HCC): ICD-10-CM

## 2025-07-10 DIAGNOSIS — R06.09 DOE (DYSPNEA ON EXERTION): ICD-10-CM

## 2025-07-10 DIAGNOSIS — J43.2 CENTRILOBULAR EMPHYSEMA (HCC): ICD-10-CM

## 2025-07-10 DIAGNOSIS — F10.90 ALCOHOL USE: ICD-10-CM

## 2025-07-10 DIAGNOSIS — G47.33 OSA (OBSTRUCTIVE SLEEP APNEA): ICD-10-CM

## 2025-07-10 DIAGNOSIS — E78.5 DYSLIPIDEMIA: ICD-10-CM

## 2025-07-10 DIAGNOSIS — I42.0 DILATED CARDIOMYOPATHY (HCC): ICD-10-CM

## 2025-07-10 PROCEDURE — 99214 OFFICE O/P EST MOD 30 MIN: CPT | Performed by: INTERNAL MEDICINE

## 2025-07-10 PROCEDURE — 93000 ELECTROCARDIOGRAM COMPLETE: CPT | Performed by: INTERNAL MEDICINE

## 2025-07-10 RX ORDER — LISINOPRIL 20 MG/1
10 TABLET ORAL DAILY
Qty: 90 TABLET | Refills: 1 | Status: SHIPPED | OUTPATIENT
Start: 2025-07-10 | End: 2025-07-10 | Stop reason: SDUPTHER

## 2025-07-10 RX ORDER — LISINOPRIL 20 MG/1
20 TABLET ORAL DAILY
Qty: 90 TABLET | Refills: 1 | Status: SHIPPED | OUTPATIENT
Start: 2025-07-10

## 2025-07-10 NOTE — PROGRESS NOTES
Progress Note - Cardiology Office  [facility]    Tracie Garnica 55 y.o. male MRN: [id number]  : 1970  Encounter: [id number]      Assessment:     1. ZAYAS (dyspnea on exertion)    2. Chronic combined systolic and diastolic congestive heart failure, NYHA class 2 (HCC)    3. Dilated cardiomyopathy (HCC)    4. Coronary artery disease involving native coronary artery of native heart without angina pectoris    5. Centrilobular emphysema (HCC)    6. PARKER (obstructive sleep apnea)    7. Alcohol use    8. Dyslipidemia        Discussion Summary and Plan:  1. Combined systolic and diastolic heart failure with dilated cardiomyopathy.  Patient EF was found to be 25- 30%.  He has recently repeat echo Doppler done.  Previous echo in  and now in 2025 at [facility] shows his EF is normalized to 65%.  Will continue Coreg Aldactone lisinopril.  There is no evidence of volume overload at this time.  His last blood test electro was 2023 which has been acceptable he take lisinopril 10 mg daily, Coreg 25 twice a day Aldactone 12.5 mg daily.  Check electrolytes.  Lisinopril will be increased to 20 mg to better control blood pressure.    2. Nonischemic cardiomyopathy.  Most likely etiology longstanding uncontrolled hypertension in the setting of LBBB and alcohol abuse.  Cardiac catheterization in  shows no obstructive CAD.  His QRS duration has normalized.  His EF has improved.  Previous echo was reviewed.     3. CKD with history of kidney injury.  His blood test from 2025 shows creatinine is now normalized to 1.13.     4. Dyspnea on exertion.  Patient had chronic exertional shortness of breath but it has worsened he has gained some weight etiology may multifactorial.  He started smoking back but not that much he used to smoke up to a pack a day we will check PFT.  Dyspnea exertion may be related to underlying deconditioning, history of smoking and need to rule out any  cardiac causes he will be scheduled for exercise stress test.    5. History of obstructive sleep apnea.  Patient is now using his CPAP machine regularly.  Discussed with the patient to be compliant.  Has been compliant with it.     6. Dyslipidemia on statin.  Patient says he is compliant with his statin blood test shows on February 2025 that his cholesterol profile acceptable.     7. Previous history of alcohol abuse and tobacco abuse.  He still drinks.  Advised him to stop drinking otherwise high risk for development and worsening of cardiomyopathy.  This was discussed with him again.  Unfortunately he is back into smoking few cigarettes and drinking encouraged him to cut back.    8. Obesity with BMI 35.  Courage him to keep losing weight    9.  Hypertension.  Currently he is taking lisinopril 10 mg daily, Coreg 25 twice a day Lasix 20 mg daily and spironolactone 12.5 mg daily.  Blood pressure has been acceptable.  Lisinopril increased to 20 mg for better control blood pressure.  BMP ordered.      Discussed with patient.  Encouraged him to lose weight be compliant with medications tried quit backing smoking.  Will check pulmonary function test, his CT scan report and other reports reviewed exercise stress disorder and blood test ordered in 2 weeks.      Patient was advised and educated to call our office  immediately or go to the nearest hospital if  patient has any new symptoms of chest pain/shortness of breath, near-syncope, syncope, light headedness sustained palpitations  or any other cardiovascular symptoms before their scheduled follow-up appointment.  Office phone number was provided #[phone].     Please call [phone] if any questions.  Counseling :  A description of the counseling.  Goals and Barriers.  Patient's ability to self care: Yes  Medication side effect reviewed with patient in detail and all their questions answered to their satisfaction.    HPI :     Tracie Garnica is a 55 y.o. year old  male who came for follow up. Patient was recently admitted to [facility] with shortness of breath.  He was diagnosed to have pulmonary edema later non workup shows that he had cardiomyopathy with EF around 20-30% with paradoxical septal motion secondary to his LBBB.  During workup of also found to have hypertension, LBBB, history of emphysema/COPD.  He used to smoke heavily quit 6 years ago.  He used to drink also now he drinks occasionally.  He underwent cardiac catheterization found to have mild nonobstructive disease and was started on medical therapy.  He has a defibrillator vest.  No ICD shocks.  Since hospitalization he has lost about 30 lb.  He is breathing much better there is no leg edema no nausea no vomiting no other issues.  He feels his doing well much better than before.      04/07/2022.    Above reviewed.  Patient came for follow-up.  Patient had history of cardiomyopathy and EF used to be around 35% which improved to 55%.  He had history of COPD, emphysema, sleep apnea, obesity with BMI around 38, history of alcohol abuse who came for follow-up.  His last blood test was in July of 2021 is triglycerides were still elevated.  They were in 500 range.  He had stopped taking his medications he has no blood test since August 2021 at that time his electrolytes were acceptable.  He does have history of sleep apnea but he could not use CPAP machine.  He is taking Aldactone every other day, Lasix 20 mg daily along with Zestril and carvedilol.  Unfortunately since last visit he has gained some weight as he lost his job and he has been at home not exercising.  He is motivated to lose weight.  His EKG shows heart rate 77 beats per minute.  He used to have LBBB currently his QRS is narrow.  He used to weigh around 225 when he was doing best now he is 267.  He understands it and is working on it.      11/22/2022.      Above reviewed.  Patient came for follow-up.  History of cardiomyopathy with EF used to be  35%, history of COPD emphysema, sleep apnea obesity with BMI now around 37, history of alcohol abuse who came for follow-up.  He says he is feeling well.  He had blood test done in April 2022 triglycerides still elevated.  He used to drink heavily he has cut back but though not completely size stop.  He is using his CPAP machine.  At his request the medication which he is not using there were removed from his list.  He is not using any inhalers.  He is otherwise feeling well.  He said he got upset with our office today as he was told that he is not on the appointment.  His weight is 255 lb which is less.  He is trying to cut back his drinking advised him to eat low potassium 4.  Repeat BMP will be ordered.  EKG reviewed with him    7/11/2023.    Above reviewed.  Patient came for follow-up.  He had history of cardiomyopathy where his EF used to be 35%, history of COPD, emphysema, sleep apnea, obesity with a BMI now around 37, history of alcohol abuse who came for follow-up.  With the treatment his EF is improved to 55% last echo was in April 2022.  He still drinks though he is not drinking as heavily.  He says he is not using any inhalers.  His last blood test was in April 2022 in [facility] system.  His medications reviewed.  He has not done his blood test but he says he has been compliant with his medications.  Unfortunately he is not able to use his CPAP machine he promises to be more compliant with it.  No leg swelling.  Exertional shortness of breath not changed vitals has been stable EKG shows sinus then with heart rate 64 bpm.  Patient has lost about 7 pounds.  He is to 48 pounds now today.    3/5/2024.    Labs reviewed.  Patient came for follow-up.  He had history of COPD emphysema, sleep apnea, obesity with a BMI now around 35 as he lost weight, history of alcohol abuse, history of dilated cardiomyopathy whose EF used to be 35% now improved to around 55% on the last echo.  Last echo was in April 2022.  His  medications were reviewed.  He is not able to use his CPAP machine and is not compliant with it.  His meds reviewed.  He says he still take Aldactone 12.5 mill daily, lisinopril 10 mg, Lasix 20 mg daily, Coreg 25 twice a day atorvastatin 40 mg daily and aspirin.  Did have mild nonobstructive disease by cath.  Cardiac catheterization was done in May 2020.  His last blood test is July 2023 which shows his electrolytes were stable but triglycerides still elevated and did not have any recent cholesterol test since April 2022.  Still continues to have chronic exertional shortness of breath.  He has not done his blood test or seen his primary care doctor.  He is willing to have repeat blood test ordered and done.  Otherwise he feels well.    7/10/2025.    Above reviewed.  Patient came for follow-up.  He had a medical history significant for cardiomyopathy which was most likely due to uncontrolled hypertension and was nonischemic type and may be have some contribution of history of alcohol abuse with EF now improved to normal.  His EF used to be around 25 to 35% currently around 65%.  His other medical history significant for COPD, emphysema, sleep apnea, obesity, history of alcohol abuse.  He has a cardiac cath done in 2020 which shows he had a mild nonobstructive coronary artery disease.  He has chronic exertional shortness of breath which continues and his blood test from 6/2/2025 reviewed.  He initially used to have LBBB with the treatment of his blood pressure and his EF low but his QRS duration normalized.  February 2025 he was admitted to [facility] with vertigo and has an echo Doppler done there.    For the last few weeks patient has been complaining about worsening shortness of breath.  His blood pressure has been high and he admitted that he has started smoking few cigarettes a day.  Some drinking.  He is short of breath when he does activities.  He has been using his CPAP machine.  He noted some  wheezing and some cough.  His blood test from 2/16/2025 reviewed CBC was acceptable.  LFTs stable.  5/8/2025 he did a CT of the chest which shows a known nodule and he is recommended to have repeat CAT scan.        Review of Systems   Constitutional:  Negative for activity change, chills, diaphoresis, fever and unexpected weight change.   HENT:  Negative for congestion.    Eyes:  Negative for discharge and redness.   Respiratory:  Positive for shortness of breath. Negative for cough, chest tightness and wheezing.    Cardiovascular: Negative.  Negative for chest pain, palpitations and leg swelling.   Gastrointestinal:  Negative for abdominal pain, diarrhea and nausea.   Endocrine: Negative.    Genitourinary:  Negative for decreased urine volume and urgency.   Musculoskeletal: Negative.  Negative for arthralgias, back pain and gait problem.   Skin:  Negative for rash and wound.   Allergic/Immunologic: Negative.    Neurological:  Negative for dizziness, seizures, syncope, weakness, light-headedness and headaches.   Hematological: Negative.    Psychiatric/Behavioral:  Negative for agitation and confusion. The patient is nervous/anxious.        Historical Information   Past Medical History:   Diagnosis Date   • Acute hypoxemic respiratory failure (HCC)    • Anxiety    • Cardiomyopathy (HCC)    • Centrilobular emphysema (HCC) 05/20/2020   • CHF (congestive heart failure) (HCC)     55% no pacer needed   • Combined systolic and diastolic cardiac dysfunction    • Coronary artery disease    • Depression    • Hypertension    • Mixed sleep apnea    • Obesity    • Secondary pulmonary arterial hypertension (HCC)    • Substance abuse (HCC)     marijuana cookie some time for sleeping.    • Vertigo 2025   • Volume overload      Past Surgical History:   Procedure Laterality Date   • CHOLECYSTECTOMY     • FRACTURE SURGERY      jaw   • KNEE SURGERY     • MANDIBLE SURGERY     • TONSILLECTOMY       Social History     Substance and Sexual  Activity   Alcohol Use Yes   • Alcohol/week: 2.0 standard drinks of alcohol   • Types: 2 Cans of beer per week    Comment: socially     Social History     Substance and Sexual Activity   Drug Use Yes   • Types: Marijuana    Comment: pot cookie once in a while for sleeping      Social History     Tobacco Use   Smoking Status Former   • Current packs/day: 0.00   • Average packs/day: 1 pack/day for 25.0 years (25.0 ttl pk-yrs)   • Types: Cigarettes   • Start date: 1989   • Quit date: 2014   • Years since quittin.2   Smokeless Tobacco Never     Family History:   Family History   Problem Relation Name Age of Onset   • Heart disease Father Dad    • Cancer Father Dad         Mom and dad   • Cancer Mother     • Colon cancer Mother     • No Known Problems Sister         Meds/Allergies     Allergies   Allergen Reactions   • Cheese - Food Allergy      Fever, nasal congestion   • Chocolate - Food Allergy      Fever, nasal congestion   • Nuts - Food Allergy      Fever, nasal congestion       Current Outpatient Medications:   •  aspirin 81 mg chewable tablet, Chew 1 tablet (81 mg total) daily, Disp: 30 tablet, Rfl: 0  •  atorvastatin (LIPITOR) 40 mg tablet, take 1 tablet by mouth EVERY EVENING WITH DINNER, Disp: 90 tablet, Rfl: 1  •  betamethasone, augmented, (DIPROLENE-AF) 0.05 % cream, Apply topically 2 (two) times a day, Disp: 15 g, Rfl: 1  •  carvedilol (COREG) 25 mg tablet, take 1 tablet by mouth twice a day with meals, Disp: 180 tablet, Rfl: 1  •  furosemide (LASIX) 20 mg tablet, take 1 tablet by mouth once daily, Disp: 90 tablet, Rfl: 1  •  lisinopril (ZESTRIL) 20 mg tablet, Take 1 tablet (20 mg total) by mouth daily, Disp: 90 tablet, Rfl: 1  •  Omeprazole-Sodium Bicarbonate (ZEGERID OTC PO), Take by mouth in the morning., Disp: , Rfl:   •  spironolactone (ALDACTONE) 25 mg tablet, take 1/2 tablet by mouth once daily, Disp: 45 tablet, Rfl: 1  •  zolpidem (AMBIEN) 10 mg tablet, Take 1 tablet (10 mg total) by  "mouth daily at bedtime as needed for sleep, Disp: 30 tablet, Rfl: 0    Vitals: Blood pressure (!) 148/102, pulse 78, height 5' 10\" (1.778 m), weight 113 kg (250 lb), SpO2 98%.    Body mass index is 35.87 kg/m².  Vitals:    07/10/25 0945   Weight: 113 kg (250 lb)       BP Readings from Last 3 Encounters:   07/10/25 (!) 148/102   07/01/25 143/99   05/15/25 144/92       Physical Exam  Constitutional:       General: He is not in acute distress.     Appearance: He is well-developed. He is not diaphoretic.   Neck:      Thyroid: No thyromegaly.      Vascular: No JVD.      Trachea: No tracheal deviation.     Cardiovascular:      Rate and Rhythm: Normal rate and regular rhythm.      Heart sounds: S1 normal and S2 normal. Heart sounds not distant. Murmur heard.      Systolic (ejection) murmur is present with a grade of 2/6.      No friction rub. No gallop. No S3 or S4 sounds.   Pulmonary:      Effort: Pulmonary effort is normal. No respiratory distress.      Breath sounds: No wheezing or rales.      Comments: Bilateral air entry with coarse breath sounds.  Chest:      Chest wall: No tenderness.   Abdominal:      General: Bowel sounds are normal. There is no distension.      Palpations: Abdomen is soft.      Tenderness: There is no abdominal tenderness.     Musculoskeletal:         General: No deformity.      Cervical back: Neck supple.     Skin:     General: Skin is warm and dry.      Coloration: Skin is not pale.      Findings: No rash.     Neurological:      Mental Status: He is alert and oriented to person, place, and time.     Psychiatric:         Behavior: Behavior normal.         Judgment: Judgment normal.         Diagnostic Studies Review Cardio:  Cardiac catheterization and echo report reviewed.      Echo Doppler done April 2022 shows patient has EF is 55% mild valvular disease mild TR with PA pressure 35 mm Hg.    Echo Doppler done in February 2025 in [facility] shows EF 65% mild valvular disease.    CTA head " and neck 2025 shows no significant occlusion of any vessel at that time.    Carotid Doppler 2025 shows no significant carotid disease.    EKG:  Patient has history of LBBB    Twelve lead EKG 2021 shows normal sinus rhythm heart rate 79 beats per minute QTC mildly prolonged.    Twelve lead EKG 2020 shows normal sinus rhythm heart rate 82 beats per minute no change from previous EKG  milliseconds.    Twelve lead EKG done on 2022:  Normal sinus rhythm heart rate 77 beats per minute Q without inferior leads most likely due to lead location poor R-wave progression no change from previous EKG QTC is 482 milliseconds.    Twelve lead EKG 2022 shows normal sinus rhythm heart rate 62 beats per minute Q-wave noted in inferior leads.  QTC is acceptable.    Twelve-lead EKG done on 2023 normal sinus rhythm heart rate 64 bpm no change from previous EKG Q waves noted in lead III..  QTc is 447 ms.    Twelve-lead EKG done on 3/5/2024 will sinus them with a heart rate 69 bpm.  Q inferior leads most like pseudo infarct pattern no change from previous EKG.  QTc is 467 ms.    Twelve-lead EKG done on 7/10/2025 shows normal sinus rhythm with heart rate 72 bpm no change from previous EKG.  QTc is 453 ms.    Cardiac testing:   Results for orders placed during the hospital encounter of 20   Echo complete with contrast if indicated    Narrative [address] [id number] [phone]    Transthoracic Echocardiogram  2D, M-mode, Doppler, and Color Doppler    Study date:  02-May-2020    Patient: ANNI CHAVEZ  MR number: [id number]  Account number: [id number]  : 1970  Age: 50 years  Gender: Male  Status: Inpatient  Location: Bedside  Height: 70 in  Weight: 270.4 lb  BP: 153/ 96 mmHg    Indications: SOB    Diagnoses: I50.9 - Heart failure, unspecified    Sonographer:  PRAMOD English  Referring Physician:  Delphine Holliday  CRNP  Group:  [facility]  Interpreting Physician:  Susannah Byrnes MD    SUMMARY    LEFT VENTRICLE:  The ventricle was mildly dilated.  Systolic function was severely reduced. Ejection fraction was estimated in the range of 20 % to 30 %.  There was severe diffuse hypokinesis.  Wall thickness was mildly increased.  There was mild concentric hypertrophy.  Doppler parameters were consistent with a reversible restrictive pattern, indicative of decreased left ventricular diastolic compliance and/or increased left atrial pressure (grade 3 diastolic dysfunction).    VENTRICULAR SEPTUM:  There was moderate paradoxical motion. These changes are consistent with LBBB.    RIGHT VENTRICLE:  The ventricle was mildly dilated.  Systolic function was mildly reduced.    LEFT ATRIUM:  The atrium was moderately dilated.    RIGHT ATRIUM:  The atrium was mildly dilated.    MITRAL VALVE:  There was mild regurgitation.    TRICUSPID VALVE:  There was mild to moderate regurgitation.  Estimated peak PA pressure was 45 to 50 mmHg.    IVC, HEPATIC VEINS:  The inferior vena cava was mildly dilated.  The respirophasic change in diameter was more than 50%.    HISTORY: PRIOR HISTORY: HTN,Acute kidney injury,pulmonary edema,esophageal reflux,Luetscher's syndrome,panic disorder,seborrhea.    PROCEDURE: The procedure was performed at the bedside. This was a routine study. The transthoracic approach was used. The study included complete 2D imaging, M-mode, complete spectral Doppler, and color Doppler. The heart rate was 95 bpm,  at the start of the study. Images were obtained from the parasternal, apical, subcostal, and suprasternal notch acoustic windows. Image quality was adequate.    LEFT VENTRICLE: The ventricle was mildly dilated. Systolic function was severely reduced. Ejection fraction was estimated in the range of 20 % to 30 %. There was severe diffuse hypokinesis. Wall thickness was mildly increased. There was  mild  concentric hypertrophy. DOPPLER: Doppler parameters were consistent with a reversible restrictive pattern, indicative of decreased left ventricular diastolic compliance and/or increased left atrial pressure (grade 3 diastolic  dysfunction).    VENTRICULAR SEPTUM: There was moderate paradoxical motion. These changes are consistent with LBBB.    RIGHT VENTRICLE: The ventricle was mildly dilated. Systolic function was mildly reduced.    LEFT ATRIUM: The atrium was moderately dilated.    RIGHT ATRIUM: The atrium was mildly dilated.    MITRAL VALVE: There was normal leaflet separation. DOPPLER: Transmitral velocity was minimally increased. There was no evidence for stenosis. There was mild regurgitation.    AORTIC VALVE: The valve was trileaflet. Leaflets exhibited mildly increased thickness and normal cuspal separation. DOPPLER: Transaortic velocity was within the normal range. There was no evidence for stenosis. There was no regurgitation.    TRICUSPID VALVE: DOPPLER: There was mild to moderate regurgitation. Estimated peak PA pressure was 45 to 50 mmHg.    PULMONIC VALVE: DOPPLER: There was no significant regurgitation.    PERICARDIUM: There was no thickening or calcification. There was no pericardial effusion.    AORTA: The root exhibited normal size.    SYSTEMIC VEINS: IVC: The inferior vena cava was mildly dilated. The respirophasic change in diameter was more than 50%.    SYSTEM MEASUREMENT TABLES    2D mode  AoR Diam 2D: 3.8 cm  LA Diam (2D): 4.2 cm  LA/Ao (2D): 1.11  FS (2D Teich): 13.6 %  IVSd (2D): 1.26 cm  LVDEV: 152 cmï¾³  LVEDV MOD BP: 261 cmï¾³  LVESV: 108 cmï¾³  LVIDd(2D): 5.57 cm  LVISd (2D): 4.81 cm  LVOT Area 2D: 3.14 cmï¾²  LVPWd (2D): 1.18 cm  SV (Teich): 44 cmï¾³    Apical four chamber  LVEF A4C: 30 %    Apical two chamber  LVEF A2C: 26 %    Unspecified Scan Mode  MATTHEW Cont Eq (Peak Pierre): 2.56 cmï¾²  LVOT (VTI): 12.4 cm  LVOT Diam.: 2 cm  LVOT Vmax: 985 mm/s  LVOT Vmax; Mean: 1010 mm/s  Peak Grad.;  "Mean: 4 mm[Hg]  SV (LVOT): 39 cmï¾³  VTI;Mean: 2 mm[Hg]  MATTHEW Cont Eq (VTI): 1.44 cmï¾²  MV Peak E Pierre. Mean: 1290 mm/s  MVA (PHT): 5.79 cmï¾²  PHT: 29 ms  Max P mm[Hg]  V Max: 3060 mm/s  Vmax: 2660 mm/s  RA Area: 27.9 cmï¾²  RA Volume: 105.7 cmï¾³  TAPSE: 1.5 cm    [facility] Accredited [facility]chocardi[facility]    Prepared and electronically signed by    Susannah Byrnes MD  Signed 02-May-2020 13:42:41         Lab Review   Lab Results   Component Value Date    WBC 7.4 2025    HGB 15.1 2025    HCT 44.7 2025    MCV 92 2025    RDW 13.1 2025     2025     BMP:  Lab Results   Component Value Date    SODIUM 144 2025    K 4.2 2025     (H) 2025    CO2 16 (L) 2025    BUN 19 2025    CREATININE 1.13 2025    GLUC 106 (H) 2025    GLUF 105 (H) 2020    CALCIUM 8.5 2021    CORRECTEDCA 9.6 2021    EGFR 76 2025    MG 2.1 2021     LFT:  Lab Results   Component Value Date    AST 25 2025    ALT 20 2025    ALKPHOS 54 2021    TP 6.9 2025    ALB 4.6 2025      Lab Results   Component Value Date    YWE1LSXQSCVZ 8.798 (H) 2020     No results found for: \"HGBA1C\"  Lipid Profile:   Lab Results   Component Value Date    CHOLESTEROL 111 2025    HDL 43 2025    LDLCALC 45 2025    TRIG 133 2025     Lab Results   Component Value Date    CHOLESTEROL 111 2025    CHOLESTEROL 123 2024     Lab Results   Component Value Date    TROPONINI <0.02 2021     Lab Results   Component Value Date    NTBNP 5,288 (H) 2020              Dr. Susannah Byrnes MD [facility]      \"This note has been constructed using a voice recognition system.Therefore there may be syntax, spelling, and/or grammatical errors. Please call if you have any questions. \"  "

## 2025-07-12 DIAGNOSIS — I10 UNCONTROLLED HYPERTENSION: ICD-10-CM

## 2025-07-14 RX ORDER — CARVEDILOL 25 MG/1
25 TABLET ORAL 2 TIMES DAILY WITH MEALS
Qty: 180 TABLET | Refills: 0 | Status: SHIPPED | OUTPATIENT
Start: 2025-07-14

## 2025-07-15 DIAGNOSIS — I50.42 CHRONIC COMBINED SYSTOLIC AND DIASTOLIC HEART FAILURE, NYHA CLASS 2 (HCC): ICD-10-CM

## 2025-07-16 RX ORDER — SPIRONOLACTONE 25 MG/1
12.5 TABLET ORAL DAILY
Qty: 45 TABLET | Refills: 1 | Status: SHIPPED | OUTPATIENT
Start: 2025-07-16

## 2025-07-21 DIAGNOSIS — G47.00 INSOMNIA, UNSPECIFIED TYPE: ICD-10-CM

## 2025-07-21 RX ORDER — ZOLPIDEM TARTRATE 10 MG/1
10 TABLET ORAL
Qty: 30 TABLET | Refills: 0 | Status: SHIPPED | OUTPATIENT
Start: 2025-07-21

## 2025-07-22 ENCOUNTER — RESULTS FOLLOW-UP (OUTPATIENT)
Dept: CARDIOLOGY CLINIC | Facility: CLINIC | Age: 55
End: 2025-07-22

## 2025-07-22 ENCOUNTER — HOSPITAL ENCOUNTER (OUTPATIENT)
Dept: PULMONOLOGY | Facility: HOSPITAL | Age: 55
Discharge: HOME | End: 2025-07-22
Attending: INTERNAL MEDICINE
Payer: COMMERCIAL

## 2025-07-22 DIAGNOSIS — F10.90 ALCOHOL USE: ICD-10-CM

## 2025-07-22 DIAGNOSIS — I25.10 CORONARY ARTERY DISEASE INVOLVING NATIVE CORONARY ARTERY OF NATIVE HEART WITHOUT ANGINA PECTORIS: ICD-10-CM

## 2025-07-22 DIAGNOSIS — I42.0 DILATED CARDIOMYOPATHY (HCC): ICD-10-CM

## 2025-07-22 DIAGNOSIS — G47.33 OSA (OBSTRUCTIVE SLEEP APNEA): ICD-10-CM

## 2025-07-22 DIAGNOSIS — R06.09 DOE (DYSPNEA ON EXERTION): ICD-10-CM

## 2025-07-22 DIAGNOSIS — I50.42 CHRONIC COMBINED SYSTOLIC AND DIASTOLIC CONGESTIVE HEART FAILURE, NYHA CLASS 2 (HCC): ICD-10-CM

## 2025-07-22 DIAGNOSIS — E78.5 DYSLIPIDEMIA: ICD-10-CM

## 2025-07-22 DIAGNOSIS — J43.2 CENTRILOBULAR EMPHYSEMA (HCC): ICD-10-CM

## 2025-07-22 LAB
BASOPHILS # BLD AUTO: 0.1 X10E3/UL (ref 0–0.2)
BASOPHILS NFR BLD AUTO: 1 %
BUN SERPL-MCNC: 21 MG/DL (ref 6–24)
BUN/CREAT SERPL: 17 (ref 9–20)
CALCIUM SERPL-MCNC: 9.8 MG/DL (ref 8.7–10.2)
CHLORIDE SERPL-SCNC: 105 MMOL/L (ref 96–106)
CO2 SERPL-SCNC: 20 MMOL/L (ref 20–29)
CREAT SERPL-MCNC: 1.22 MG/DL (ref 0.76–1.27)
EGFR: 70 ML/MIN/1.73
EOSINOPHIL # BLD AUTO: 0.2 X10E3/UL (ref 0–0.4)
EOSINOPHIL NFR BLD AUTO: 2 %
ERYTHROCYTE [DISTWIDTH] IN BLOOD BY AUTOMATED COUNT: 13 % (ref 11.6–15.4)
GLUCOSE SERPL-MCNC: 91 MG/DL (ref 70–99)
HCT VFR BLD AUTO: 46.5 % (ref 37.5–51)
HGB BLD-MCNC: 15.6 G/DL (ref 13–17.7)
IMM GRANULOCYTES # BLD: 0 X10E3/UL (ref 0–0.1)
IMM GRANULOCYTES NFR BLD: 0 %
LYMPHOCYTES # BLD AUTO: 2.4 X10E3/UL (ref 0.7–3.1)
LYMPHOCYTES NFR BLD AUTO: 26 %
MCH RBC QN AUTO: 30.9 PG (ref 26.6–33)
MCHC RBC AUTO-ENTMCNC: 33.5 G/DL (ref 31.5–35.7)
MCV RBC AUTO: 92 FL (ref 79–97)
MONOCYTES # BLD AUTO: 0.7 X10E3/UL (ref 0.1–0.9)
MONOCYTES NFR BLD AUTO: 8 %
NEUTROPHILS # BLD AUTO: 5.6 X10E3/UL (ref 1.4–7)
NEUTROPHILS NFR BLD AUTO: 63 %
PLATELET # BLD AUTO: 213 X10E3/UL (ref 150–450)
POTASSIUM SERPL-SCNC: 4.6 MMOL/L (ref 3.5–5.2)
RBC # BLD AUTO: 5.05 X10E6/UL (ref 4.14–5.8)
SODIUM SERPL-SCNC: 140 MMOL/L (ref 134–144)
TSH SERPL DL<=0.005 MIU/L-ACNC: 4.65 UIU/ML (ref 0.45–4.5)
WBC # BLD AUTO: 8.9 X10E3/UL (ref 3.4–10.8)

## 2025-07-22 PROCEDURE — 94726 PLETHYSMOGRAPHY LUNG VOLUMES: CPT | Performed by: INTERNAL MEDICINE

## 2025-07-22 PROCEDURE — 94729 DIFFUSING CAPACITY: CPT

## 2025-07-22 PROCEDURE — 94729 DIFFUSING CAPACITY: CPT | Performed by: INTERNAL MEDICINE

## 2025-07-22 PROCEDURE — 94760 N-INVAS EAR/PLS OXIMETRY 1: CPT

## 2025-07-22 PROCEDURE — 94060 EVALUATION OF WHEEZING: CPT | Performed by: INTERNAL MEDICINE

## 2025-07-22 PROCEDURE — 94726 PLETHYSMOGRAPHY LUNG VOLUMES: CPT

## 2025-07-22 PROCEDURE — 94060 EVALUATION OF WHEEZING: CPT

## 2025-07-22 RX ORDER — ALBUTEROL SULFATE 0.83 MG/ML
2.5 SOLUTION RESPIRATORY (INHALATION) ONCE
Status: COMPLETED | OUTPATIENT
Start: 2025-07-22 | End: 2025-07-22

## 2025-07-22 RX ADMIN — ALBUTEROL SULFATE 2.5 MG: 2.5 SOLUTION RESPIRATORY (INHALATION) at 08:49

## 2025-07-22 NOTE — TELEPHONE ENCOUNTER
----- Message from Susannah Byrnes MD sent at 7/22/2025  9:45 AM EDT -----  Patient blood test reviewed.  They are acceptable.  Will continue same medication.  Patient should keep his appointment for follow-up.  ----- Message -----  From: [facility] Lab Results In  Sent: 7/22/2025   7:35 AM EDT  To: Susannah Byrnes MD

## 2025-07-28 NOTE — TELEPHONE ENCOUNTER
I called and spoke with patient, made aware of results.   He has a stress test scheduled this week and I told him once we get those results we will call him.  He verbalized understanding.

## 2025-07-28 NOTE — TELEPHONE ENCOUNTER
----- Message from Susannah Byrnes MD sent at 7/28/2025  8:56 AM EDT -----  Patient's lung function is read as normal.  ----- Message -----  From: Alana Jennings DO  Sent: 7/25/2025   5:23 PM EDT  To: Susannah Byrnes MD

## 2025-07-31 ENCOUNTER — HOSPITAL ENCOUNTER (OUTPATIENT)
Dept: NON INVASIVE DIAGNOSTICS | Facility: HOSPITAL | Age: 55
Discharge: HOME | End: 2025-07-31
Attending: INTERNAL MEDICINE
Payer: COMMERCIAL

## 2025-07-31 VITALS
RESPIRATION RATE: 20 BRPM | OXYGEN SATURATION: 98 % | HEART RATE: 65 BPM | DIASTOLIC BLOOD PRESSURE: 90 MMHG | SYSTOLIC BLOOD PRESSURE: 120 MMHG

## 2025-07-31 DIAGNOSIS — I25.10 CORONARY ARTERY DISEASE INVOLVING NATIVE CORONARY ARTERY OF NATIVE HEART WITHOUT ANGINA PECTORIS: ICD-10-CM

## 2025-07-31 DIAGNOSIS — J43.2 CENTRILOBULAR EMPHYSEMA (HCC): ICD-10-CM

## 2025-07-31 DIAGNOSIS — F10.90 ALCOHOL USE: ICD-10-CM

## 2025-07-31 DIAGNOSIS — E78.5 DYSLIPIDEMIA: ICD-10-CM

## 2025-07-31 DIAGNOSIS — R06.09 DOE (DYSPNEA ON EXERTION): ICD-10-CM

## 2025-07-31 DIAGNOSIS — G47.33 OSA (OBSTRUCTIVE SLEEP APNEA): ICD-10-CM

## 2025-07-31 DIAGNOSIS — I42.0 DILATED CARDIOMYOPATHY (HCC): ICD-10-CM

## 2025-07-31 DIAGNOSIS — I50.42 CHRONIC COMBINED SYSTOLIC AND DIASTOLIC CONGESTIVE HEART FAILURE, NYHA CLASS 2 (HCC): ICD-10-CM

## 2025-07-31 LAB
CHEST PAIN STATEMENT: NORMAL
MAX DIASTOLIC BP: 70 MMHG
MAX PREDICTED HEART RATE: 164 BPM
PROTOCOL NAME: NORMAL
STRESS POST EXERCISE DUR MIN: 8 MIN
STRESS POST EXERCISE DUR SEC: 25 SEC
STRESS POST PEAK HR: 115 BPM
STRESS POST PEAK SYSTOLIC BP: 180 MMHG
TARGET HR FORMULA: NORMAL
TEST INDICATION: NORMAL

## 2025-07-31 PROCEDURE — 93018 CV STRESS TEST I&R ONLY: CPT | Performed by: INTERNAL MEDICINE

## 2025-07-31 PROCEDURE — 93017 CV STRESS TEST TRACING ONLY: CPT

## 2025-07-31 PROCEDURE — 93016 CV STRESS TEST SUPVJ ONLY: CPT | Performed by: INTERNAL MEDICINE

## 2025-08-01 LAB
MAX HR PERCENT: 70 %
MAX HR: 115 BPM
RATE PRESSURE PRODUCT: NORMAL
SL CV STRESS RECOVERY BP: NORMAL MMHG
SL CV STRESS RECOVERY HR: 72 BPM
SL CV STRESS RECOVERY O2 SAT: 97 %
SL CV STRESS STAGE REACHED: 3
STRESS ANGINA INDEX: 0
STRESS BASELINE BP: NORMAL MMHG
STRESS BASELINE HR: 65 BPM
STRESS O2 SAT REST: 65 %
STRESS PEAK HR: 115 BPM
STRESS POST ESTIMATED WORKLOAD: 10.1 METS
STRESS POST EXERCISE DUR MIN: 8 MIN
STRESS POST EXERCISE DUR SEC: 25 SEC
STRESS POST O2 SAT PEAK: 98 %
STRESS POST PEAK BP: 180 MMHG

## 2025-08-01 NOTE — TELEPHONE ENCOUNTER
----- Message from Susannah Byrnes MD sent at 8/1/2025 12:15 PM EDT -----  Patient's stress test acceptable though limited but he was able to get 70% and he has good exercise ability.  Continue current Rx and keep appointment.  ----- Message -----  From: Susannah Byrnes MD  Sent: 8/1/2025   9:40 AM EDT  To: Susannah Byrnes MD

## 2025-08-12 ENCOUNTER — OFFICE VISIT (OUTPATIENT)
Age: 55
End: 2025-08-12

## 2025-08-13 ENCOUNTER — TELEPHONE (OUTPATIENT)
Age: 55
End: 2025-08-13

## 2025-08-14 ENCOUNTER — PATIENT OUTREACH (OUTPATIENT)
Age: 55
End: 2025-08-14

## 2025-08-17 ENCOUNTER — HOSPITAL ENCOUNTER (EMERGENCY)
Facility: HOSPITAL | Age: 55
Discharge: HOME | End: 2025-08-17
Attending: EMERGENCY MEDICINE | Admitting: EMERGENCY MEDICINE
Payer: COMMERCIAL

## 2025-08-17 VITALS
OXYGEN SATURATION: 96 % | DIASTOLIC BLOOD PRESSURE: 84 MMHG | RESPIRATION RATE: 28 BRPM | SYSTOLIC BLOOD PRESSURE: 160 MMHG | TEMPERATURE: 98.1 F | HEART RATE: 79 BPM

## 2025-08-17 DIAGNOSIS — Z72.89 DELIBERATE SELF-CUTTING: ICD-10-CM

## 2025-08-17 DIAGNOSIS — F32.A DEPRESSION: Primary | ICD-10-CM

## 2025-08-17 DIAGNOSIS — Z00.8 ENCOUNTER FOR PSYCHOLOGICAL EVALUATION: ICD-10-CM

## 2025-08-17 LAB
ALBUMIN SERPL BCG-MCNC: 4.5 G/DL (ref 3.5–5)
ALP SERPL-CCNC: 57 U/L (ref 34–104)
ALT SERPL W P-5'-P-CCNC: 22 U/L (ref 7–52)
AMPHETAMINES SERPL QL SCN: NEGATIVE
ANION GAP SERPL CALCULATED.3IONS-SCNC: 11 MMOL/L (ref 4–13)
AST SERPL W P-5'-P-CCNC: 16 U/L (ref 13–39)
BARBITURATES UR QL: NEGATIVE
BASOPHILS # BLD AUTO: 0.07 THOUSANDS/ÂΜL (ref 0–0.1)
BASOPHILS NFR BLD AUTO: 1 % (ref 0–1)
BENZODIAZ UR QL: NEGATIVE
BILIRUB SERPL-MCNC: 0.47 MG/DL (ref 0.2–1)
BILIRUB UR QL STRIP: NEGATIVE
BUN SERPL-MCNC: 23 MG/DL (ref 5–25)
CALCIUM SERPL-MCNC: 9.2 MG/DL (ref 8.4–10.2)
CHLORIDE SERPL-SCNC: 107 MMOL/L (ref 96–108)
CLARITY UR: CLEAR
CO2 SERPL-SCNC: 20 MMOL/L (ref 21–32)
COCAINE UR QL: NEGATIVE
COLOR UR: COLORLESS
CREAT SERPL-MCNC: 1.12 MG/DL (ref 0.6–1.3)
EOSINOPHIL # BLD AUTO: 0.23 THOUSAND/ÂΜL (ref 0–0.61)
EOSINOPHIL NFR BLD AUTO: 2 % (ref 0–6)
ERYTHROCYTE [DISTWIDTH] IN BLOOD BY AUTOMATED COUNT: 13.8 % (ref 11.6–15.1)
ETHANOL SERPL-MCNC: 129 MG/DL
FENTANYL UR QL SCN: NEGATIVE
GFR SERPL CREATININE-BSD FRML MDRD: 73 ML/MIN/1.73SQ M
GLUCOSE SERPL-MCNC: 101 MG/DL (ref 65–140)
GLUCOSE UR STRIP-MCNC: NEGATIVE MG/DL
HCT VFR BLD AUTO: 46.5 % (ref 36.5–49.3)
HGB BLD-MCNC: 15.6 G/DL (ref 12–17)
HGB UR QL STRIP.AUTO: NEGATIVE
HYDROCODONE UR QL SCN: NEGATIVE
IMM GRANULOCYTES # BLD AUTO: 0.06 THOUSAND/UL (ref 0–0.2)
IMM GRANULOCYTES NFR BLD AUTO: 1 % (ref 0–2)
KETONES UR STRIP-MCNC: NEGATIVE MG/DL
LEUKOCYTE ESTERASE UR QL STRIP: NEGATIVE
LYMPHOCYTES # BLD AUTO: 2.76 THOUSANDS/ÂΜL (ref 0.6–4.47)
LYMPHOCYTES NFR BLD AUTO: 27 % (ref 14–44)
MCH RBC QN AUTO: 31 PG (ref 26.8–34.3)
MCHC RBC AUTO-ENTMCNC: 33.5 G/DL (ref 31.4–37.4)
MCV RBC AUTO: 92 FL (ref 82–98)
METHADONE UR QL: NEGATIVE
MONOCYTES # BLD AUTO: 0.74 THOUSAND/ÂΜL (ref 0.17–1.22)
MONOCYTES NFR BLD AUTO: 7 % (ref 4–12)
NEUTROPHILS # BLD AUTO: 6.5 THOUSANDS/ÂΜL (ref 1.85–7.62)
NEUTS SEG NFR BLD AUTO: 62 % (ref 43–75)
NITRITE UR QL STRIP: NEGATIVE
NRBC BLD AUTO-RTO: 0 /100 WBCS
OPIATES UR QL SCN: NEGATIVE
OXYCODONE+OXYMORPHONE UR QL SCN: NEGATIVE
PCP UR QL: NEGATIVE
PH UR STRIP.AUTO: 5.5 [PH]
PLATELET # BLD AUTO: 206 THOUSANDS/UL (ref 149–390)
PMV BLD AUTO: 9.9 FL (ref 8.9–12.7)
POTASSIUM SERPL-SCNC: 3.8 MMOL/L (ref 3.5–5.3)
PROT SERPL-MCNC: 7.4 G/DL (ref 6.4–8.4)
PROT UR STRIP-MCNC: NEGATIVE MG/DL
RBC # BLD AUTO: 5.04 MILLION/UL (ref 3.88–5.62)
SODIUM SERPL-SCNC: 138 MMOL/L (ref 135–147)
SP GR UR STRIP.AUTO: 1.01 (ref 1–1.03)
THC UR QL: NEGATIVE
UROBILINOGEN UR STRIP-ACNC: <2 MG/DL
WBC # BLD AUTO: 10.36 THOUSAND/UL (ref 4.31–10.16)

## 2025-08-17 PROCEDURE — 81003 URINALYSIS AUTO W/O SCOPE: CPT | Performed by: EMERGENCY MEDICINE

## 2025-08-17 PROCEDURE — 99284 EMERGENCY DEPT VISIT MOD MDM: CPT | Performed by: EMERGENCY MEDICINE

## 2025-08-17 PROCEDURE — 85025 COMPLETE CBC W/AUTO DIFF WBC: CPT | Performed by: EMERGENCY MEDICINE

## 2025-08-17 PROCEDURE — 99284 EMERGENCY DEPT VISIT MOD MDM: CPT

## 2025-08-17 PROCEDURE — 82077 ASSAY SPEC XCP UR&BREATH IA: CPT | Performed by: EMERGENCY MEDICINE

## 2025-08-17 PROCEDURE — 80053 COMPREHEN METABOLIC PANEL: CPT | Performed by: EMERGENCY MEDICINE

## 2025-08-17 PROCEDURE — 80307 DRUG TEST PRSMV CHEM ANLYZR: CPT | Performed by: EMERGENCY MEDICINE

## 2025-08-17 PROCEDURE — 36415 COLL VENOUS BLD VENIPUNCTURE: CPT | Performed by: EMERGENCY MEDICINE

## 2025-08-17 RX ORDER — LISINOPRIL 20 MG/1
20 TABLET ORAL DAILY
Status: DISCONTINUED | OUTPATIENT
Start: 2025-08-18 | End: 2025-08-17 | Stop reason: HOSPADM

## 2025-08-17 RX ORDER — BUPROPION HYDROCHLORIDE 150 MG/1
150 TABLET ORAL EVERY MORNING
Status: DISCONTINUED | OUTPATIENT
Start: 2025-08-18 | End: 2025-08-17 | Stop reason: HOSPADM

## 2025-08-17 RX ORDER — ATORVASTATIN CALCIUM 40 MG/1
40 TABLET, FILM COATED ORAL
Status: DISCONTINUED | OUTPATIENT
Start: 2025-08-17 | End: 2025-08-17 | Stop reason: HOSPADM

## 2025-08-17 RX ORDER — SODIUM BICARBONATE 650 MG/1
1300 TABLET ORAL
Status: DISCONTINUED | OUTPATIENT
Start: 2025-08-18 | End: 2025-08-17 | Stop reason: HOSPADM

## 2025-08-17 RX ORDER — FUROSEMIDE 20 MG/1
20 TABLET ORAL DAILY
Status: DISCONTINUED | OUTPATIENT
Start: 2025-08-18 | End: 2025-08-17 | Stop reason: HOSPADM

## 2025-08-17 RX ORDER — ZOLPIDEM TARTRATE 5 MG/1
10 TABLET ORAL
Status: DISCONTINUED | OUTPATIENT
Start: 2025-08-17 | End: 2025-08-17 | Stop reason: HOSPADM

## 2025-08-17 RX ORDER — SPIRONOLACTONE 25 MG/1
12.5 TABLET ORAL DAILY
Status: DISCONTINUED | OUTPATIENT
Start: 2025-08-17 | End: 2025-08-17 | Stop reason: HOSPADM

## 2025-08-17 RX ORDER — ASPIRIN 81 MG/1
81 TABLET, CHEWABLE ORAL DAILY
Status: DISCONTINUED | OUTPATIENT
Start: 2025-08-18 | End: 2025-08-17 | Stop reason: HOSPADM

## 2025-08-17 RX ORDER — PANTOPRAZOLE SODIUM 40 MG/1
40 TABLET, DELAYED RELEASE ORAL
Status: DISCONTINUED | OUTPATIENT
Start: 2025-08-18 | End: 2025-08-17 | Stop reason: HOSPADM

## 2025-08-17 RX ORDER — CLONAZEPAM 0.5 MG/1
0.5 TABLET ORAL 2 TIMES DAILY
Status: DISCONTINUED | OUTPATIENT
Start: 2025-08-17 | End: 2025-08-17 | Stop reason: HOSPADM

## 2025-08-17 RX ORDER — CARVEDILOL 12.5 MG/1
25 TABLET ORAL 2 TIMES DAILY WITH MEALS
Status: DISCONTINUED | OUTPATIENT
Start: 2025-08-17 | End: 2025-08-17 | Stop reason: HOSPADM

## 2025-08-17 RX ORDER — OMEPRAZOLE/SODIUM BICARBONATE 20MG-1.1G
CAPSULE ORAL DAILY
Status: DISCONTINUED | OUTPATIENT
Start: 2025-08-18 | End: 2025-08-17

## 2025-08-17 RX ADMIN — CARVEDILOL 25 MG: 12.5 TABLET, FILM COATED ORAL at 18:46

## 2025-08-17 RX ADMIN — CLONAZEPAM 0.5 MG: 0.5 TABLET ORAL at 18:46

## 2025-08-17 RX ADMIN — SPIRONOLACTONE 12.5 MG: 25 TABLET ORAL at 18:47

## 2025-08-17 RX ADMIN — ATORVASTATIN CALCIUM 40 MG: 40 TABLET, FILM COATED ORAL at 18:46

## 2025-08-17 RX ADMIN — ZOLPIDEM TARTRATE 10 MG: 5 TABLET ORAL at 18:46

## 2025-08-18 ENCOUNTER — PATIENT OUTREACH (OUTPATIENT)
Age: 55
End: 2025-08-18

## 2025-08-19 ENCOUNTER — PATIENT OUTREACH (OUTPATIENT)
Age: 55
End: 2025-08-19

## 2025-08-19 DIAGNOSIS — F41.9 ANXIETY: ICD-10-CM

## 2025-08-19 RX ORDER — CLONAZEPAM 0.5 MG/1
0.5 TABLET ORAL 2 TIMES DAILY
Qty: 14 TABLET | Refills: 0 | Status: SHIPPED | OUTPATIENT
Start: 2025-08-19

## 2025-08-21 ENCOUNTER — PATIENT OUTREACH (OUTPATIENT)
Age: 55
End: 2025-08-21

## 2025-08-22 DIAGNOSIS — G47.00 INSOMNIA, UNSPECIFIED TYPE: ICD-10-CM

## 2025-08-22 RX ORDER — ZOLPIDEM TARTRATE 10 MG/1
10 TABLET ORAL
Qty: 30 TABLET | Refills: 0 | Status: SHIPPED | OUTPATIENT
Start: 2025-08-22